# Patient Record
Sex: FEMALE | Race: WHITE | NOT HISPANIC OR LATINO | Employment: UNEMPLOYED | URBAN - NONMETROPOLITAN AREA
[De-identification: names, ages, dates, MRNs, and addresses within clinical notes are randomized per-mention and may not be internally consistent; named-entity substitution may affect disease eponyms.]

---
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
## 2023-06-02 ENCOUNTER — PATIENT OUTREACH (OUTPATIENT)
Dept: LABOR AND DELIVERY | Facility: HOSPITAL | Age: 18
End: 2023-06-02

## 2023-06-02 ENCOUNTER — REFERRAL TRIAGE (OUTPATIENT)
Dept: LABOR AND DELIVERY | Facility: HOSPITAL | Age: 18
End: 2023-06-02

## 2023-06-02 NOTE — OUTREACH NOTE
[facility]  Enrollment    Current Estimated Gestational Age: Unknown    Questions/Answers      Flowsheet Row Responses   Would like to participate? Yes   Date of Intake Visit 06/05/23  [call after 10 AM to complete Intake]            [facility]     Zo Palencia RN  Maternity Nurse Navigator    6/2/2023, 13:02 CDT

## 2023-06-05 ENCOUNTER — PATIENT OUTREACH (OUTPATIENT)
Dept: LABOR AND DELIVERY | Facility: HOSPITAL | Age: 18
End: 2023-06-05
Payer: COMMERCIAL

## 2023-06-05 NOTE — OUTREACH NOTE
[facility]  Intake    Current Estimated Gestational Age: 16w2d    Intake Assessment      Flowsheet Row Responses   Best Method for Contacting Cell   Currently Employed Yes  [-fulltime]   Able to keep appointments as scheduled Yes   Gender(s) and Name(s) male   Do you have a dentist? No  [Dental Clinics sent to client in resource letter]   Resources Presently Utilizing:   [Client states she currently does not have custody of her 2 children.  She states a family friend has them but she hopes to have custody back within a month.]   Maternal Warning Signs Provided  [sent as attachment to resource letter]   Other: Provided  [second trimester of pregnancy and round ligament pain sent to client in AVS]   Other Education HANDS, How to find a dentist, How to find a pediatrician, How to find a primary care provider, Insurance benefits/Incentives, Meds to Beds, Mental Health Services, SNAP Benefits, Smoking/Vaping Cessation, Substance Use Disorder Treatment, Transportation Assistance, WIC Benefits  [HANDS referral faxed with verbal consent from client]            Learning Assessment      Flowsheet Row Responses   Relationship Patient   Does the learner have any barriers to learning? No Barriers   What is the preferred language of the learner for medical teaching? English   How does the learner prefer to learn new concepts? Listening, Reading, Demonstration, Pictures/Video          SDOH updated and reviewed with the patient during this program:  Financial Resource Strain: Low Risk     Difficulty of Paying Living Expenses: Not very hard      Physical Activity: Sufficiently Active    Days of Exercise per Week: 5 days    Minutes of Exercise per Session: 30 min      Food Insecurity: No Food Insecurity    Worried About Running Out of Food in the Last Year: Never true    Ran Out of Food in the Last Year: Never true      Social Connections: Not on file      Transportation Needs: No Transportation  Needs    Lack of Transportation (Medical): No    Lack of Transportation (Non-Medical): No      Housing Stability: Not on file      Stress: Not on file           Referral submitted to the following resources (verbal consent received to submit demographic information):     SAUL Palencia RN  Maternity Nurse Navigator    6/5/2023, 10:14 CDT

## 2023-07-01 PROBLEM — O03.4 INCOMPLETE ABORTION: Status: ACTIVE | Noted: 2023-07-01

## 2023-07-02 PROBLEM — O03.1 ABORTION, INCOMPLETE WITH HEMORRHAGE: Status: ACTIVE | Noted: 2023-07-02

## 2023-10-06 ENCOUNTER — HOSPITAL ENCOUNTER (EMERGENCY)
Age: 18
Discharge: HOME | End: 2023-10-06
Attending: EMERGENCY MEDICINE
Payer: MEDICAID

## 2023-10-06 ENCOUNTER — APPOINTMENT (OUTPATIENT)
Dept: CT IMAGING | Age: 18
End: 2023-10-06
Payer: MEDICAID

## 2023-10-06 VITALS
WEIGHT: 110 LBS | DIASTOLIC BLOOD PRESSURE: 78 MMHG | OXYGEN SATURATION: 96 % | HEIGHT: 58 IN | HEART RATE: 92 BPM | RESPIRATION RATE: 18 BRPM | SYSTOLIC BLOOD PRESSURE: 132 MMHG | TEMPERATURE: 98.1 F | BODY MASS INDEX: 23.09 KG/M2

## 2023-10-06 DIAGNOSIS — R10.84 GENERALIZED ABDOMINAL PAIN: ICD-10-CM

## 2023-10-06 DIAGNOSIS — R11.2 NAUSEA AND VOMITING, UNSPECIFIED VOMITING TYPE: Primary | ICD-10-CM

## 2023-10-06 LAB
ALBUMIN SERPL-MCNC: 5.2 G/DL (ref 3.5–5.2)
ALP SERPL-CCNC: 41 U/L (ref 35–104)
ALT SERPL-CCNC: 17 U/L (ref 5–33)
ANION GAP SERPL CALCULATED.3IONS-SCNC: 14 MMOL/L (ref 7–19)
AST SERPL-CCNC: 22 U/L (ref 5–32)
BACTERIA URNS QL MICRO: NEGATIVE /HPF
BASOPHILS # BLD: 0 K/UL (ref 0–0.2)
BASOPHILS NFR BLD: 0.2 % (ref 0–1)
BILIRUB SERPL-MCNC: <0.2 MG/DL (ref 0.2–1.2)
BILIRUB UR QL STRIP: NEGATIVE
BUN SERPL-MCNC: 13 MG/DL (ref 6–20)
CALCIUM SERPL-MCNC: 10 MG/DL (ref 8.6–10)
CHLORIDE SERPL-SCNC: 104 MMOL/L (ref 98–111)
CLARITY UR: CLEAR
CO2 SERPL-SCNC: 20 MMOL/L (ref 22–29)
COLOR UR: YELLOW
CREAT SERPL-MCNC: 0.5 MG/DL (ref 0.5–0.9)
CRYSTALS URNS MICRO: ABNORMAL /HPF
EOSINOPHIL # BLD: 0 K/UL (ref 0–0.6)
EOSINOPHIL NFR BLD: 0.1 % (ref 0–5)
EPI CELLS #/AREA URNS AUTO: 6 /HPF (ref 0–5)
ERYTHROCYTE [DISTWIDTH] IN BLOOD BY AUTOMATED COUNT: 13.3 % (ref 11.5–14.5)
GLUCOSE SERPL-MCNC: 138 MG/DL (ref 74–109)
GLUCOSE UR STRIP.AUTO-MCNC: NEGATIVE MG/DL
HCG SERPL QL: NEGATIVE
HCT VFR BLD AUTO: 34.5 % (ref 37–47)
HGB BLD-MCNC: 11.3 G/DL (ref 12–16)
HGB UR STRIP.AUTO-MCNC: NEGATIVE MG/L
HYALINE CASTS #/AREA URNS AUTO: 11 /HPF (ref 0–8)
IMM GRANULOCYTES # BLD: 0.1 K/UL
KETONES UR STRIP.AUTO-MCNC: NEGATIVE MG/DL
LEUKOCYTE ESTERASE UR QL STRIP.AUTO: NEGATIVE
LIPASE SERPL-CCNC: 19 U/L (ref 13–60)
LYMPHOCYTES # BLD: 0.8 K/UL (ref 1.1–4.5)
LYMPHOCYTES NFR BLD: 4 % (ref 20–40)
MCH RBC QN AUTO: 26.8 PG (ref 27–31)
MCHC RBC AUTO-ENTMCNC: 32.8 G/DL (ref 33–37)
MCV RBC AUTO: 81.8 FL (ref 81–99)
MONOCYTES # BLD: 0.5 K/UL (ref 0–0.9)
MONOCYTES NFR BLD: 2.3 % (ref 0–10)
NEUTROPHILS # BLD: 18 K/UL (ref 1.5–7.5)
NEUTS SEG NFR BLD: 93.1 % (ref 50–65)
NITRITE UR QL STRIP.AUTO: NEGATIVE
PH UR STRIP.AUTO: 8 [PH] (ref 5–8)
PLATELET # BLD AUTO: 283 K/UL (ref 130–400)
PMV BLD AUTO: 11.1 FL (ref 9.4–12.3)
POTASSIUM SERPL-SCNC: 3.9 MMOL/L (ref 3.5–5)
PROT SERPL-MCNC: 7.9 G/DL (ref 6.6–8.7)
PROT UR STRIP.AUTO-MCNC: 100 MG/DL
RBC # BLD AUTO: 4.22 M/UL (ref 4.2–5.4)
RBC #/AREA URNS AUTO: 1 /HPF (ref 0–4)
SODIUM SERPL-SCNC: 138 MMOL/L (ref 136–145)
SP GR UR STRIP.AUTO: 1.04 (ref 1–1.03)
UROBILINOGEN UR STRIP.AUTO-MCNC: 0.2 E.U./DL
WBC # BLD AUTO: 19.3 K/UL (ref 4.8–10.8)
WBC #/AREA URNS AUTO: 1 /HPF (ref 0–5)

## 2023-10-06 PROCEDURE — 6360000004 HC RX CONTRAST MEDICATION: Performed by: EMERGENCY MEDICINE

## 2023-10-06 PROCEDURE — 96374 THER/PROPH/DIAG INJ IV PUSH: CPT

## 2023-10-06 PROCEDURE — 84703 CHORIONIC GONADOTROPIN ASSAY: CPT

## 2023-10-06 PROCEDURE — 80053 COMPREHEN METABOLIC PANEL: CPT

## 2023-10-06 PROCEDURE — 96361 HYDRATE IV INFUSION ADD-ON: CPT

## 2023-10-06 PROCEDURE — 83690 ASSAY OF LIPASE: CPT

## 2023-10-06 PROCEDURE — 2580000003 HC RX 258: Performed by: EMERGENCY MEDICINE

## 2023-10-06 PROCEDURE — 81001 URINALYSIS AUTO W/SCOPE: CPT

## 2023-10-06 PROCEDURE — 2500000003 HC RX 250 WO HCPCS: Performed by: EMERGENCY MEDICINE

## 2023-10-06 PROCEDURE — 85025 COMPLETE CBC W/AUTO DIFF WBC: CPT

## 2023-10-06 PROCEDURE — 74177 CT ABD & PELVIS W/CONTRAST: CPT

## 2023-10-06 PROCEDURE — 99285 EMERGENCY DEPT VISIT HI MDM: CPT

## 2023-10-06 PROCEDURE — 6360000002 HC RX W HCPCS: Performed by: EMERGENCY MEDICINE

## 2023-10-06 PROCEDURE — 96375 TX/PRO/DX INJ NEW DRUG ADDON: CPT

## 2023-10-06 PROCEDURE — 36415 COLL VENOUS BLD VENIPUNCTURE: CPT

## 2023-10-06 RX ORDER — ONDANSETRON 4 MG/1
4 TABLET, ORALLY DISINTEGRATING ORAL EVERY 8 HOURS PRN
Qty: 15 TABLET | Refills: 0 | Status: SHIPPED | OUTPATIENT
Start: 2023-10-06

## 2023-10-06 RX ORDER — METOCLOPRAMIDE HYDROCHLORIDE 5 MG/ML
10 INJECTION INTRAMUSCULAR; INTRAVENOUS ONCE
Status: COMPLETED | OUTPATIENT
Start: 2023-10-06 | End: 2023-10-06

## 2023-10-06 RX ORDER — 0.9 % SODIUM CHLORIDE 0.9 %
1000 INTRAVENOUS SOLUTION INTRAVENOUS ONCE
Status: COMPLETED | OUTPATIENT
Start: 2023-10-06 | End: 2023-10-06

## 2023-10-06 RX ORDER — DROPERIDOL 2.5 MG/ML
0.62 INJECTION, SOLUTION INTRAMUSCULAR; INTRAVENOUS ONCE
Status: COMPLETED | OUTPATIENT
Start: 2023-10-06 | End: 2023-10-06

## 2023-10-06 RX ORDER — ONDANSETRON 2 MG/ML
4 INJECTION INTRAMUSCULAR; INTRAVENOUS ONCE
Status: COMPLETED | OUTPATIENT
Start: 2023-10-06 | End: 2023-10-06

## 2023-10-06 RX ADMIN — SODIUM CHLORIDE, PRESERVATIVE FREE 20 MG: 5 INJECTION INTRAVENOUS at 16:05

## 2023-10-06 RX ADMIN — METOCLOPRAMIDE 10 MG: 5 INJECTION, SOLUTION INTRAMUSCULAR; INTRAVENOUS at 13:15

## 2023-10-06 RX ADMIN — SODIUM CHLORIDE 1000 ML: 9 INJECTION, SOLUTION INTRAVENOUS at 13:20

## 2023-10-06 RX ADMIN — ONDANSETRON 4 MG: 2 INJECTION INTRAMUSCULAR; INTRAVENOUS at 17:52

## 2023-10-06 RX ADMIN — DROPERIDOL 0.62 MG: 2.5 INJECTION, SOLUTION INTRAMUSCULAR; INTRAVENOUS at 16:04

## 2023-10-06 RX ADMIN — IOPAMIDOL 75 ML: 755 INJECTION, SOLUTION INTRAVENOUS at 16:25

## 2023-10-06 ASSESSMENT — PAIN - FUNCTIONAL ASSESSMENT: PAIN_FUNCTIONAL_ASSESSMENT: 0-10

## 2023-10-06 ASSESSMENT — ENCOUNTER SYMPTOMS
NAUSEA: 1
ABDOMINAL PAIN: 1
VOMITING: 1
SHORTNESS OF BREATH: 0

## 2023-10-06 ASSESSMENT — PAIN DESCRIPTION - LOCATION: LOCATION: ABDOMEN

## 2023-10-06 ASSESSMENT — PAIN SCALES - GENERAL: PAINLEVEL_OUTOF10: 8

## 2023-10-06 NOTE — ED PROVIDER NOTES
[facility] EMERGENCY DEPT  EMERGENCY DEPARTMENT ENCOUNTER      Pt Name: Kelsie Arevalo  MRN: [id number]  [address] [facility] [place] 2005  Date of evaluation: 10/6/2023  Provider: Roseline Monahan       Chief Complaint   Patient presents with    Abdominal Pain    Emesis     Since 0400 today         HISTORY OF PRESENT ILLNESS   (Location/Symptom, Timing/Onset, Context/Setting, Quality, Duration, Modifying Factors, Severity)  Note limiting factors. This is a 20-year-old female with a history of asthma, anxiety, depression, and ADHD presenting to the emergency department secondary to nausea and vomiting. The symptoms were sudden onset this morning approximately 430 a.m. Since onset of symptoms, her symptoms have not improved. Now, she complains of generalized abdominal tenderness. The patient smokes marijuana but otherwise denies other drug or alcohol use. The history is provided by the patient. Nursing Notes were reviewed. REVIEW OF SYSTEMS    (2-9 systems for level 4, 10 or more for level 5)     Review of Systems   Constitutional:  Negative for fever. Respiratory:  Negative for shortness of breath. Cardiovascular:  Negative for chest pain. Gastrointestinal:  Positive for abdominal pain, nausea and vomiting. Psychiatric/Behavioral:  Negative for confusion. All other systems reviewed and are negative. Except as noted above the remainder of the review of systems was reviewed and negative. PAST MEDICAL HISTORY     Past Medical History:   Diagnosis Date    ADHD (attention deficit hyperactivity disorder)     Anxiety and depression     Asthma     Depression          SURGICAL HISTORY       Past Surgical History:   Procedure Laterality Date    CYST REMOVAL      Right foot         CURRENT MEDICATIONS       Previous Medications    No medications on file       ALLERGIES     Patient has no known allergies.     FAMILY HISTORY       Family History   Problem Relation Age of Onset

## 2023-11-06 ENCOUNTER — HOSPITAL ENCOUNTER (EMERGENCY)
Age: 18
Discharge: HOME | End: 2023-11-06
Attending: EMERGENCY MEDICINE
Payer: MEDICAID

## 2023-11-06 VITALS
SYSTOLIC BLOOD PRESSURE: 118 MMHG | HEART RATE: 78 BPM | RESPIRATION RATE: 18 BRPM | OXYGEN SATURATION: 97 % | WEIGHT: 100 LBS | DIASTOLIC BLOOD PRESSURE: 78 MMHG | HEIGHT: 58 IN | TEMPERATURE: 97.8 F | BODY MASS INDEX: 20.99 KG/M2

## 2023-11-06 DIAGNOSIS — R19.7 NAUSEA VOMITING AND DIARRHEA: Primary | ICD-10-CM

## 2023-11-06 DIAGNOSIS — R11.2 NAUSEA VOMITING AND DIARRHEA: Primary | ICD-10-CM

## 2023-11-06 LAB
ALBUMIN SERPL-MCNC: 5.4 G/DL (ref 3.5–5.2)
ALP SERPL-CCNC: 54 U/L (ref 35–104)
ALT SERPL-CCNC: 19 U/L (ref 5–33)
ANION GAP SERPL CALCULATED.3IONS-SCNC: 16 MMOL/L (ref 7–19)
AST SERPL-CCNC: 33 U/L (ref 5–32)
BACTERIA #/AREA URNS HPF: ABNORMAL /HPF
BILIRUB SERPL-MCNC: 0.6 MG/DL (ref 0.2–1.2)
BILIRUB UR QL STRIP: NEGATIVE
BUN SERPL-MCNC: 11 MG/DL (ref 6–20)
CALCIUM SERPL-MCNC: 10.2 MG/DL (ref 8.6–10)
CHLORIDE SERPL-SCNC: 95 MMOL/L (ref 98–111)
CLARITY UR: ABNORMAL
CO2 SERPL-SCNC: 25 MMOL/L (ref 22–29)
COLOR UR: ABNORMAL
CREAT SERPL-MCNC: 0.5 MG/DL (ref 0.5–0.9)
ERYTHROCYTE [DISTWIDTH] IN BLOOD BY AUTOMATED COUNT: 14.3 % (ref 11.5–14.5)
GLUCOSE SERPL-MCNC: 103 MG/DL (ref 74–109)
GLUCOSE UR STRIP.AUTO-MCNC: NEGATIVE MG/DL
HCG SERPL QL: NEGATIVE
HCT VFR BLD AUTO: 40.1 % (ref 37–47)
HGB BLD-MCNC: 13.4 G/DL (ref 12–16)
HGB UR STRIP.AUTO-MCNC: NEGATIVE MG/L
KETONES UR STRIP.AUTO-MCNC: =>160 MG/DL
LEUKOCYTE ESTERASE UR QL STRIP.AUTO: ABNORMAL
LIPASE SERPL-CCNC: 28 U/L (ref 13–60)
MCH RBC QN AUTO: 26.2 PG (ref 27–31)
MCHC RBC AUTO-ENTMCNC: 33.4 G/DL (ref 33–37)
MCV RBC AUTO: 78.3 FL (ref 81–99)
MUCOUS THREADS URNS QL MICRO: ABNORMAL /LPF
NITRITE UR QL STRIP.AUTO: NEGATIVE
PH UR STRIP.AUTO: 6 [PH] (ref 5–8)
PLATELET # BLD AUTO: 290 K/UL (ref 130–400)
PMV BLD AUTO: 10.3 FL (ref 9.4–12.3)
POTASSIUM SERPL-SCNC: 3.7 MMOL/L (ref 3.5–5)
PROT SERPL-MCNC: 8.1 G/DL (ref 6.6–8.7)
PROT UR STRIP.AUTO-MCNC: 100 MG/DL
RBC # BLD AUTO: 5.12 M/UL (ref 4.2–5.4)
RBC #/AREA URNS HPF: ABNORMAL /HPF (ref 0–2)
SODIUM SERPL-SCNC: 136 MMOL/L (ref 136–145)
SP GR UR STRIP.AUTO: 1.04 (ref 1–1.03)
SQUAMOUS #/AREA URNS HPF: ABNORMAL /HPF
UROBILINOGEN UR STRIP.AUTO-MCNC: 1 E.U./DL
WBC # BLD AUTO: 13.2 K/UL (ref 4.8–10.8)
WBC #/AREA URNS HPF: ABNORMAL /HPF (ref 0–5)

## 2023-11-06 PROCEDURE — 2580000003 HC RX 258: Performed by: EMERGENCY MEDICINE

## 2023-11-06 PROCEDURE — 81001 URINALYSIS AUTO W/SCOPE: CPT

## 2023-11-06 PROCEDURE — 6370000000 HC RX 637 (ALT 250 FOR IP): Performed by: EMERGENCY MEDICINE

## 2023-11-06 PROCEDURE — 6360000002 HC RX W HCPCS: Performed by: EMERGENCY MEDICINE

## 2023-11-06 PROCEDURE — 87086 URINE CULTURE/COLONY COUNT: CPT

## 2023-11-06 PROCEDURE — 83690 ASSAY OF LIPASE: CPT

## 2023-11-06 PROCEDURE — 96374 THER/PROPH/DIAG INJ IV PUSH: CPT

## 2023-11-06 PROCEDURE — 80053 COMPREHEN METABOLIC PANEL: CPT

## 2023-11-06 PROCEDURE — 96361 HYDRATE IV INFUSION ADD-ON: CPT

## 2023-11-06 PROCEDURE — 36415 COLL VENOUS BLD VENIPUNCTURE: CPT

## 2023-11-06 PROCEDURE — 96375 TX/PRO/DX INJ NEW DRUG ADDON: CPT

## 2023-11-06 PROCEDURE — 85027 COMPLETE CBC AUTOMATED: CPT

## 2023-11-06 PROCEDURE — 84703 CHORIONIC GONADOTROPIN ASSAY: CPT

## 2023-11-06 PROCEDURE — 99284 EMERGENCY DEPT VISIT MOD MDM: CPT

## 2023-11-06 PROCEDURE — 87186 SC STD MICRODIL/AGAR DIL: CPT

## 2023-11-06 RX ORDER — ONDANSETRON 4 MG/1
4 TABLET, ORALLY DISINTEGRATING ORAL 3 TIMES DAILY PRN
Qty: 12 TABLET | Refills: 0 | Status: SHIPPED | OUTPATIENT
Start: 2023-11-06

## 2023-11-06 RX ORDER — 0.9 % SODIUM CHLORIDE 0.9 %
1000 INTRAVENOUS SOLUTION INTRAVENOUS ONCE
Status: COMPLETED | OUTPATIENT
Start: 2023-11-06 | End: 2023-11-06

## 2023-11-06 RX ORDER — ONDANSETRON 2 MG/ML
4 INJECTION INTRAMUSCULAR; INTRAVENOUS ONCE
Status: COMPLETED | OUTPATIENT
Start: 2023-11-06 | End: 2023-11-06

## 2023-11-06 RX ORDER — METOCLOPRAMIDE HYDROCHLORIDE 5 MG/ML
10 INJECTION INTRAMUSCULAR; INTRAVENOUS ONCE
Status: COMPLETED | OUTPATIENT
Start: 2023-11-06 | End: 2023-11-06

## 2023-11-06 RX ADMIN — SODIUM CHLORIDE 1000 ML: 9 INJECTION, SOLUTION INTRAVENOUS at 21:19

## 2023-11-06 RX ADMIN — ALUMINUM HYDROXIDE, MAGNESIUM HYDROXIDE, AND SIMETHICONE: 200; 200; 20 SUSPENSION ORAL at 22:50

## 2023-11-06 RX ADMIN — ONDANSETRON 4 MG: 2 INJECTION INTRAMUSCULAR; INTRAVENOUS at 21:19

## 2023-11-06 RX ADMIN — METOCLOPRAMIDE 10 MG: 5 INJECTION, SOLUTION INTRAMUSCULAR; INTRAVENOUS at 22:50

## 2023-11-06 ASSESSMENT — ENCOUNTER SYMPTOMS
BACK PAIN: 0
SHORTNESS OF BREATH: 0
BLOOD IN STOOL: 0
DIARRHEA: 1
VOMITING: 1
NAUSEA: 1
EYE PAIN: 0
ABDOMINAL PAIN: 0

## 2023-11-06 ASSESSMENT — PAIN DESCRIPTION - LOCATION: LOCATION: ABDOMEN

## 2023-11-06 ASSESSMENT — PAIN - FUNCTIONAL ASSESSMENT: PAIN_FUNCTIONAL_ASSESSMENT: 0-10

## 2023-11-06 ASSESSMENT — LIFESTYLE VARIABLES: HOW OFTEN DO YOU HAVE A DRINK CONTAINING ALCOHOL: NEVER

## 2023-11-06 ASSESSMENT — PAIN DESCRIPTION - DESCRIPTORS: DESCRIPTORS: ACHING

## 2023-11-06 ASSESSMENT — PAIN SCALES - GENERAL: PAINLEVEL_OUTOF10: 6

## 2023-11-07 NOTE — ED PROVIDER NOTES
[facility] EMERGENCY DEPT  eMERGENCY dEPARTMENT eNCOUnter      Pt Name: Kalia Lao  MRN: [id number]  [address] [facility] 2005  Date of evaluation: 11/6/2023  Provider: Lisa Kingsley MD    [address]       Chief Complaint   Patient presents with    Emesis     Vomiting and diarrhea x 3 days         HISTORY OF PRESENT ILLNESS   (Location/Symptom, Timing/Onset,Context/Setting, Quality, Duration, Modifying Factors, Severity)  Note limiting factors. Kalia Lao is a 19 y.o. female who presents to the emergency department due to nausea vomiting diarrhea. Symptoms for 3 days. No recent travel or sick contacts. No blood emesis or stools. No urinary complaints. Denies any chronic medical problems. Denies abdominal pain. Says her abdomen feels a little sore from vomiting so much but denies pain otherwise. No fevers. No dysuria or flank pain. HPI    NursingNotes were reviewed. REVIEW OF SYSTEMS    (2-9 systems for level 4, 10 or more for level 5)     Review of Systems   Constitutional:  Negative for fever. Eyes:  Negative for pain. Respiratory:  Negative for shortness of breath. Cardiovascular:  Negative for chest pain and palpitations. Gastrointestinal:  Positive for diarrhea, nausea and vomiting. Negative for abdominal pain and blood in stool. Genitourinary:  Positive for decreased urine volume (slight). Negative for difficulty urinating, dysuria and flank pain. Musculoskeletal:  Negative for back pain and neck pain. Skin:  Negative for rash. Neurological:  Negative for weakness and headaches. All other systems reviewed and are negative. A complete review of systems was performed and is negative except as noted above in the HPI.        PAST MEDICAL HISTORY     Past Medical History:   Diagnosis Date    ADHD (attention deficit hyperactivity disorder)     Anxiety and depression     Asthma     Depression          SURGICAL HISTORY       Past Surgical History:   Procedure Laterality Date

## 2023-11-08 ENCOUNTER — HOSPITAL ENCOUNTER (EMERGENCY)
Age: 18
Discharge: HOME | End: 2023-11-08
Payer: MEDICAID

## 2023-11-08 ENCOUNTER — APPOINTMENT (OUTPATIENT)
Dept: CT IMAGING | Age: 18
End: 2023-11-08
Payer: MEDICAID

## 2023-11-08 VITALS
DIASTOLIC BLOOD PRESSURE: 82 MMHG | TEMPERATURE: 98.4 F | RESPIRATION RATE: 16 BRPM | OXYGEN SATURATION: 94 % | HEART RATE: 101 BPM | SYSTOLIC BLOOD PRESSURE: 114 MMHG

## 2023-11-08 DIAGNOSIS — R19.7 NAUSEA VOMITING AND DIARRHEA: Primary | ICD-10-CM

## 2023-11-08 DIAGNOSIS — R11.2 NAUSEA VOMITING AND DIARRHEA: Primary | ICD-10-CM

## 2023-11-08 LAB
ALBUMIN SERPL-MCNC: 5 G/DL (ref 3.5–5.2)
ALP SERPL-CCNC: 49 U/L (ref 35–104)
ALT SERPL-CCNC: 31 U/L (ref 5–33)
AMPHET UR QL SCN: NEGATIVE
AST SERPL-CCNC: 28 U/L (ref 5–32)
BACTERIA #/AREA URNS HPF: ABNORMAL /HPF
BARBITURATES UR QL SCN: NEGATIVE
BASOPHILS # BLD: 0 K/UL (ref 0–0.2)
BASOPHILS NFR BLD: 0.4 % (ref 0–1)
BENZODIAZ UR QL SCN: NEGATIVE
BILIRUB SERPL-MCNC: 0.7 MG/DL (ref 0.2–1.2)
BILIRUB UR QL STRIP: ABNORMAL
BUN SERPL-MCNC: 7 MG/DL (ref 6–20)
BUPRENORPHINE URINE: NEGATIVE
CALCIUM SERPL-MCNC: 9.9 MG/DL (ref 8.6–10)
CANNABINOIDS UR QL SCN: POSITIVE
CHLORIDE SERPL-SCNC: 91 MMOL/L (ref 98–111)
CLARITY UR: ABNORMAL
COCAINE UR QL SCN: NEGATIVE
COLOR UR: ABNORMAL
CREAT SERPL-MCNC: 0.5 MG/DL (ref 0.5–0.9)
DRUG SCREEN COMMENT UR-IMP: ABNORMAL
EOSINOPHIL # BLD: 0 K/UL (ref 0–0.6)
EOSINOPHIL NFR BLD: 0.4 % (ref 0–5)
ERYTHROCYTE [DISTWIDTH] IN BLOOD BY AUTOMATED COUNT: 14.2 % (ref 11.5–14.5)
FENTANYL SCREEN, URINE: NEGATIVE
GLUCOSE SERPL-MCNC: 91 MG/DL (ref 74–109)
GLUCOSE UR STRIP.AUTO-MCNC: NEGATIVE MG/DL
HCT VFR BLD AUTO: 39.2 % (ref 37–47)
HGB BLD-MCNC: 13.3 G/DL (ref 12–16)
HGB UR STRIP.AUTO-MCNC: ABNORMAL MG/L
IMM GRANULOCYTES # BLD: 0 K/UL
KETONES UR STRIP.AUTO-MCNC: 40 MG/DL
LEUKOCYTE ESTERASE UR QL STRIP.AUTO: ABNORMAL
LYMPHOCYTES # BLD: 3 K/UL (ref 1.1–4.5)
LYMPHOCYTES NFR BLD: 32.5 % (ref 20–40)
MCH RBC QN AUTO: 26.2 PG (ref 27–31)
MCHC RBC AUTO-ENTMCNC: 33.9 G/DL (ref 33–37)
MCV RBC AUTO: 77.2 FL (ref 81–99)
METHADONE UR QL SCN: NEGATIVE
METHAMPHETAMINE, URINE: NEGATIVE
MONOCYTES # BLD: 0.6 K/UL (ref 0–0.9)
MONOCYTES NFR BLD: 7.1 % (ref 0–10)
NEUTROPHILS # BLD: 5.4 K/UL (ref 1.5–7.5)
NEUTS SEG NFR BLD: 59.4 % (ref 50–65)
NITRITE UR QL STRIP.AUTO: POSITIVE
OPIATES UR QL SCN: NEGATIVE
OXYCODONE UR QL SCN: NEGATIVE
PCP UR QL SCN: NEGATIVE
PH UR STRIP.AUTO: 8.5 [PH] (ref 5–8)
PLATELET # BLD AUTO: 261 K/UL (ref 130–400)
PMV BLD AUTO: 10.5 FL (ref 9.4–12.3)
POTASSIUM SERPL-SCNC: 3.3 MMOL/L (ref 3.5–5)
PROT SERPL-MCNC: 7.8 G/DL (ref 6.6–8.7)
PROT UR STRIP.AUTO-MCNC: 100 MG/DL
RBC # BLD AUTO: 5.08 M/UL (ref 4.2–5.4)
RBC #/AREA URNS HPF: ABNORMAL /HPF (ref 0–2)
SODIUM SERPL-SCNC: 131 MMOL/L (ref 136–145)
SP GR UR STRIP.AUTO: >=1.045 (ref 1–1.03)
SQUAMOUS #/AREA URNS HPF: ABNORMAL /HPF
TRICYCLIC, URINE: NEGATIVE
UROBILINOGEN UR STRIP.AUTO-MCNC: 0.2 E.U./DL
WBC # BLD AUTO: 9.1 K/UL (ref 4.8–10.8)
WBC #/AREA URNS HPF: ABNORMAL /HPF (ref 0–5)

## 2023-11-08 PROCEDURE — 85025 COMPLETE CBC W/AUTO DIFF WBC: CPT

## 2023-11-08 PROCEDURE — 96372 THER/PROPH/DIAG INJ SC/IM: CPT

## 2023-11-08 PROCEDURE — 36415 COLL VENOUS BLD VENIPUNCTURE: CPT

## 2023-11-08 PROCEDURE — 74177 CT ABD & PELVIS W/CONTRAST: CPT

## 2023-11-08 PROCEDURE — 96375 TX/PRO/DX INJ NEW DRUG ADDON: CPT

## 2023-11-08 PROCEDURE — 99285 EMERGENCY DEPT VISIT HI MDM: CPT

## 2023-11-08 PROCEDURE — 6370000000 HC RX 637 (ALT 250 FOR IP): Performed by: PHYSICIAN ASSISTANT

## 2023-11-08 PROCEDURE — 2580000003 HC RX 258: Performed by: NURSE PRACTITIONER

## 2023-11-08 PROCEDURE — 6360000004 HC RX CONTRAST MEDICATION: Performed by: NURSE PRACTITIONER

## 2023-11-08 PROCEDURE — 81001 URINALYSIS AUTO W/SCOPE: CPT

## 2023-11-08 PROCEDURE — 6360000002 HC RX W HCPCS: Performed by: PHYSICIAN ASSISTANT

## 2023-11-08 PROCEDURE — 80053 COMPREHEN METABOLIC PANEL: CPT

## 2023-11-08 PROCEDURE — 80307 DRUG TEST PRSMV CHEM ANLYZR: CPT

## 2023-11-08 PROCEDURE — 96374 THER/PROPH/DIAG INJ IV PUSH: CPT

## 2023-11-08 RX ORDER — DICYCLOMINE HYDROCHLORIDE 10 MG/ML
20 INJECTION INTRAMUSCULAR ONCE
Status: COMPLETED | OUTPATIENT
Start: 2023-11-08 | End: 2023-11-08

## 2023-11-08 RX ORDER — CEPHALEXIN 500 MG/1
500 CAPSULE ORAL 2 TIMES DAILY
Qty: 14 CAPSULE | Refills: 0 | Status: SHIPPED | OUTPATIENT
Start: 2023-11-08 | End: 2023-11-08

## 2023-11-08 RX ORDER — ONDANSETRON 2 MG/ML
4 INJECTION INTRAMUSCULAR; INTRAVENOUS ONCE
Status: COMPLETED | OUTPATIENT
Start: 2023-11-08 | End: 2023-11-08

## 2023-11-08 RX ORDER — METOCLOPRAMIDE HYDROCHLORIDE 5 MG/ML
10 INJECTION INTRAMUSCULAR; INTRAVENOUS ONCE
Status: COMPLETED | OUTPATIENT
Start: 2023-11-08 | End: 2023-11-08

## 2023-11-08 RX ORDER — 0.9 % SODIUM CHLORIDE 0.9 %
500 INTRAVENOUS SOLUTION INTRAVENOUS ONCE
Status: COMPLETED | OUTPATIENT
Start: 2023-11-08 | End: 2023-11-08

## 2023-11-08 RX ORDER — DROPERIDOL 2.5 MG/ML
0.62 INJECTION, SOLUTION INTRAMUSCULAR; INTRAVENOUS EVERY 6 HOURS PRN
Status: DISCONTINUED | OUTPATIENT
Start: 2023-11-08 | End: 2023-11-09 | Stop reason: HOSPADM

## 2023-11-08 RX ADMIN — ONDANSETRON 4 MG: 2 INJECTION INTRAMUSCULAR; INTRAVENOUS at 21:50

## 2023-11-08 RX ADMIN — METOCLOPRAMIDE 10 MG: 5 INJECTION, SOLUTION INTRAMUSCULAR; INTRAVENOUS at 20:52

## 2023-11-08 RX ADMIN — SODIUM CHLORIDE 500 ML: 9 INJECTION, SOLUTION INTRAVENOUS at 18:40

## 2023-11-08 RX ADMIN — ALUMINUM HYDROXIDE, MAGNESIUM HYDROXIDE, AND SIMETHICONE: 200; 200; 20 SUSPENSION ORAL at 21:52

## 2023-11-08 RX ADMIN — DROPERIDOL 0.62 MG: 2.5 INJECTION, SOLUTION INTRAMUSCULAR; INTRAVENOUS at 23:09

## 2023-11-08 RX ADMIN — DICYCLOMINE HYDROCHLORIDE 20 MG: 20 INJECTION, SOLUTION INTRAMUSCULAR at 20:52

## 2023-11-08 RX ADMIN — IOPAMIDOL 90 ML: 755 INJECTION, SOLUTION INTRAVENOUS at 19:12

## 2023-11-08 ASSESSMENT — ENCOUNTER SYMPTOMS
COLOR CHANGE: 0
VOMITING: 1
PHOTOPHOBIA: 0
BLOOD IN STOOL: 0
BACK PAIN: 0
APNEA: 0
RESPIRATORY NEGATIVE: 1
COUGH: 0
SORE THROAT: 0
ABDOMINAL DISTENTION: 0
EYE DISCHARGE: 0
SHORTNESS OF BREATH: 0
EYE PAIN: 0
NAUSEA: 1
RHINORRHEA: 0
ABDOMINAL PAIN: 1
DIARRHEA: 0

## 2023-11-08 ASSESSMENT — PAIN SCALES - GENERAL
PAINLEVEL_OUTOF10: 8
PAINLEVEL_OUTOF10: 4

## 2023-11-08 ASSESSMENT — PAIN DESCRIPTION - LOCATION
LOCATION: ABDOMEN
LOCATION: ABDOMEN

## 2023-11-08 ASSESSMENT — PAIN DESCRIPTION - DESCRIPTORS
DESCRIPTORS: ACHING;CRAMPING
DESCRIPTORS: SHARP

## 2023-11-08 NOTE — ED PROVIDER NOTES
[facility] EMERGENCY DEPT  EMERGENCY DEPARTMENT ENCOUNTER      Pt Name: Joel Baxter  MRN: [id number]  [address] [facility] 2005  Date of evaluation: 11/8/2023  Provider: JACKSON Peter NP    CHIEF COMPLAINT       Chief Complaint   Patient presents with    Emesis         HISTORY OF PRESENT ILLNESS   (Location/Symptom, Timing/Onset,Context/Setting, Quality, Duration, Modifying Factors, Severity)  Note limiting factors. Joel Baxter is a 19 y.o. female who presents to the emergency department complaint of vomiting for 4 days. She was evaluated in this ER 2 days earlier and declined a CT of the abdomen at that time. She saw her primary care provider yesterday and was given Phenergan suppositories but reports that she continues to vomit. She denies any blood in the vomitus and denies diarrhea. She reports that she has had this problem for \"a long time\". She denies any sexual activity in the last few days and her urine hCG was negative on 11/6. The history is provided by the patient. NursingNotes were reviewed. REVIEW OF SYSTEMS    (2-9 systems for level 4, 10 or more for level 5)     Review of Systems   Constitutional:  Negative for activity change, chills and fever. Respiratory: Negative. Cardiovascular: Negative. Gastrointestinal:  Positive for abdominal pain, nausea and vomiting. Negative for blood in stool and diarrhea. Genitourinary: Negative. Musculoskeletal: Negative. Neurological: Negative. All other systems reviewed and are negative. A complete review of systems was performed and is negative except as noted above in the HPI.        PAST MEDICAL HISTORY     Past Medical History:   Diagnosis Date    ADHD (attention deficit hyperactivity disorder)     Anxiety and depression     Asthma     Depression          SURGICAL HISTORY       Past Surgical History:   Procedure Laterality Date    CYST REMOVAL      Right foot         CURRENT MEDICATIONS       Current Discharge

## 2023-11-09 LAB
BACTERIA UR CULT: ABNORMAL
BACTERIA UR CULT: ABNORMAL
ORGANISM: ABNORMAL

## 2023-11-09 NOTE — DISCHARGE INSTRUCTIONS
Need outpatient stool sample for hpylori rule out with cat desir  Avoid marijuana usage 72 hrs  Liquid diet

## 2023-11-09 NOTE — ED NOTES
Pt able to keep PO medication down and states nausea has subsided at this time.       Elodia Shaikh  11/08/23 9894

## 2023-11-09 NOTE — ED NOTES
Pt back to room from [facility], [address] [place] [address], [address] [place] [address]  11/08/23 1919

## 2023-11-09 NOTE — ED PROVIDER NOTES
[facility] EMERGENCY DEPT  eMERGENCYdEPARTMENT eNCOUnter      Pt Name: Chelsea Lagunas  MRN: [id number]  [address] [facility] 2005  Date of evaluation: 11/8/2023  Provider:ORI Duncan    CHIEF COMPLAINT       Chief Complaint   Patient presents with    Emesis         HISTORY OF PRESENT ILLNESS  (Location/Symptom, Timing/Onset, Context/Setting, Quality, Duration, Modifying Factors, Severity.)   Chelsea Lagunas is a 19 y.o. female who presents to the emergency department with emesis soft abdomen promethazine suppositories on board I am inheriting from [facility] on day shift awaiting CT scan. Has been asleep here without emesis here. No fever chills fluids given. Has been here before Ct offered then and declined followed with PCP promethazine was given. She has some loose stool recently moved in with boyfriend has well water. H pylori in differential.     HPI    Nursing Notes were reviewed and I agree. REVIEW OF SYSTEMS    (2-9 systems for level 4, 10 or more for level 5)     Review of Systems   Constitutional:  Negative for activity change, appetite change, chills and fever. HENT:  Negative for congestion, postnasal drip, rhinorrhea and sore throat. Eyes:  Negative for photophobia, pain, discharge and visual disturbance. Respiratory:  Negative for apnea, cough and shortness of breath. Cardiovascular:  Negative for chest pain and leg swelling. Gastrointestinal:  Positive for abdominal pain, nausea and vomiting. Negative for abdominal distention. Genitourinary:  Negative for vaginal bleeding. Musculoskeletal:  Negative for arthralgias, back pain, joint swelling, neck pain and neck stiffness. Skin:  Negative for color change and rash. Neurological:  Negative for dizziness, syncope, facial asymmetry and headaches. Hematological:  Negative for adenopathy. Does not bruise/bleed easily. Psychiatric/Behavioral:  Negative for agitation, behavioral problems and confusion.          Except as noted above the remainder of

## 2023-11-09 NOTE — ED NOTES
Pt ambulatory with steady gait to restroom. Pt unable to give urine sample or stool sample at this time. Dasia REHMAN notified and aware.       Wood Garcia, [address]  11/08/23 2047

## 2023-11-10 ENCOUNTER — HOSPITAL ENCOUNTER (EMERGENCY)
Facility: HOSPITAL | Age: 18
Discharge: HOME | End: 2023-11-10
Payer: COMMERCIAL

## 2023-11-10 ENCOUNTER — NURSE TRIAGE (OUTPATIENT)
Dept: OTHER | Facility: CLINIC | Age: 18
End: 2023-11-10

## 2023-11-10 VITALS
DIASTOLIC BLOOD PRESSURE: 95 MMHG | BODY MASS INDEX: 19.31 KG/M2 | OXYGEN SATURATION: 96 % | WEIGHT: 89.5 LBS | TEMPERATURE: 98.6 F | RESPIRATION RATE: 18 BRPM | HEIGHT: 57 IN | HEART RATE: 78 BPM | SYSTOLIC BLOOD PRESSURE: 127 MMHG

## 2023-11-10 DIAGNOSIS — N39.0 BACTERIAL URINARY INFECTION: Primary | ICD-10-CM

## 2023-11-10 DIAGNOSIS — A49.9 BACTERIAL URINARY INFECTION: Primary | ICD-10-CM

## 2023-11-10 LAB
AMPHET+METHAMPHET UR QL: NEGATIVE
AMPHETAMINES UR QL: NEGATIVE
B-HCG UR QL: NEGATIVE
BACTERIA UR QL AUTO: ABNORMAL /HPF
BARBITURATES UR QL SCN: NEGATIVE
BENZODIAZ UR QL SCN: NEGATIVE
BILIRUB UR QL STRIP: NEGATIVE
BUPRENORPHINE SERPL-MCNC: NEGATIVE NG/ML
CANNABINOIDS SERPL QL: POSITIVE
CLARITY UR: ABNORMAL
COCAINE UR QL: NEGATIVE
COLOR UR: YELLOW
FENTANYL UR-MCNC: NEGATIVE NG/ML
GLUCOSE UR STRIP-MCNC: NEGATIVE MG/DL
HGB UR QL STRIP.AUTO: ABNORMAL
HYALINE CASTS UR QL AUTO: ABNORMAL /LPF
KETONES UR QL STRIP: ABNORMAL
LEUKOCYTE ESTERASE UR QL STRIP.AUTO: ABNORMAL
METHADONE UR QL SCN: NEGATIVE
NITRITE UR QL STRIP: POSITIVE
OPIATES UR QL: NEGATIVE
OXYCODONE UR QL SCN: NEGATIVE
PCP UR QL SCN: NEGATIVE
PH UR STRIP.AUTO: 7.5 [PH] (ref 5–8)
PROT UR QL STRIP: NEGATIVE
RBC # UR STRIP: ABNORMAL /HPF
REF LAB TEST METHOD: ABNORMAL
SP GR UR STRIP: 1.02 (ref 1–1.03)
SQUAMOUS #/AREA URNS HPF: ABNORMAL /HPF
TRICYCLICS UR QL SCN: NEGATIVE
UROBILINOGEN UR QL STRIP: ABNORMAL
WBC # UR STRIP: ABNORMAL /HPF

## 2023-11-10 PROCEDURE — 87186 SC STD MICRODIL/AGAR DIL: CPT | Performed by: EMERGENCY MEDICINE

## 2023-11-10 PROCEDURE — 87086 URINE CULTURE/COLONY COUNT: CPT | Performed by: EMERGENCY MEDICINE

## 2023-11-10 PROCEDURE — 87077 CULTURE AEROBIC IDENTIFY: CPT | Performed by: EMERGENCY MEDICINE

## 2023-11-10 PROCEDURE — 99282 EMERGENCY DEPT VISIT SF MDM: CPT

## 2023-11-10 PROCEDURE — 81025 URINE PREGNANCY TEST: CPT | Performed by: EMERGENCY MEDICINE

## 2023-11-10 PROCEDURE — 81001 URINALYSIS AUTO W/SCOPE: CPT | Performed by: EMERGENCY MEDICINE

## 2023-11-10 PROCEDURE — 80307 DRUG TEST PRSMV CHEM ANLYZR: CPT | Performed by: EMERGENCY MEDICINE

## 2023-11-10 RX ORDER — SULFAMETHOXAZOLE AND TRIMETHOPRIM 800; 160 MG/1; MG/1
1 TABLET ORAL 2 TIMES DAILY
Qty: 20 TABLET | Refills: 0 | Status: SHIPPED | OUTPATIENT
Start: 2023-11-10 | End: 2023-11-20

## 2023-11-10 NOTE — ED PROVIDER NOTES
Subjective   History of Present Illness  18 year old female presents to the ED with complaints of nausea and vomiting.  This has been present for a week. She denies recent sick contact or travel. She has had a full workup completed at [facility].  She was initially seen there on 11/6 and had serum and urine labs performed, a CT abdomen was offered, however, patient declined at that time. She returned to [facility] ED on 11/8 had labs repeated, without abnormal findings, a CT was performed at that time, also negative.  Urine drug screen reveals marijuana use.  She was advised to avoid marijuana use and follow up with her primary care provider. She reports she called her primary care provider today who told her to return to the ER.       Review of Systems   Constitutional: Negative.    HENT: Negative.     Eyes: Negative.    Respiratory: Negative.     Gastrointestinal:  Positive for nausea and vomiting. Negative for abdominal pain.   Genitourinary: Negative.    Musculoskeletal: Negative.    Neurological: Negative.    Hematological: Negative.    Psychiatric/Behavioral: Negative.         Past Medical History:   Diagnosis Date    ADHD     Allergic     Anxiety     Asthma     Depression        Allergies   Allergen Reactions    Blue Dyes (Parenteral) Rash    Other Rash      Blackberries, cats       Past Surgical History:   Procedure Laterality Date    DILATATION AND CURETTAGE N/A 7/1/2023    Procedure: DILATATION AND CURETTAGE;  Surgeon: Darryl Del Valle MD;  Location: [facility] OR;  Service: Obstetrics/Gynecology;  Laterality: N/A;    FOOT SURGERY         Family History   Problem Relation Age of Onset    Arthritis Mother     Depression Mother     Dementia Mother     Hypertension Mother     Heart attack Father     Diabetes Father     Alcohol abuse Father     Hypertension Maternal Grandmother     Diabetes Maternal Grandfather     Hypertension Maternal Grandfather     Heart disease Maternal Grandfather     Breast cancer Neg Hx      Ovarian cancer Neg Hx     Uterine cancer Neg Hx     Colon cancer Neg Hx        Social History     Socioeconomic History    Marital status: Single   Tobacco Use    Smoking status: Former     Types: Cigarettes     Quit date: 2/24/2020     Years since quitting: 3.7    Smokeless tobacco: Never   Vaping Use    Vaping Use: Every day    Substances: Nicotine   Substance and Sexual Activity    Alcohol use: No    Drug use: Yes     Types: Marijuana     Comment: 8/12/22 last use    Sexual activity: Yes     Partners: Male     Birth control/protection: None           Objective   Physical Exam  Constitutional:       Appearance: She is normal weight. She is not ill-appearing or toxic-appearing.   HENT:      Head: Normocephalic.   Cardiovascular:      Rate and Rhythm: Normal rate.      Heart sounds: Normal heart sounds.   Pulmonary:      Effort: Pulmonary effort is normal.   Abdominal:      General: Abdomen is flat. Bowel sounds are normal.      Palpations: Abdomen is soft.      Tenderness: generalized  There is no guarding or rebound. Negative signs include psoas sign and obturator sign.   Musculoskeletal:         General: Normal range of motion.   Skin:     General: Skin is warm and dry.      Capillary Refill: Capillary refill takes less than 2 seconds.   Neurological:      General: No focal deficit present.      Mental Status: She is alert and oriented to person, place, and time.         Procedures           ED Course  ED Course as of 11/10/23 1342   Fri Nov 10, 2023   1340 Urinalysis positive for bacteria and nitrates [JM]      ED Course User Index  [JM] Sonia Arora APRN                                           Medical Decision Making  Problems Addressed:  Bacterial urinary infection: self-limited or minor problem    Amount and/or Complexity of Data Reviewed  External Data Reviewed: labs and radiology.     Details: Urinalysis suspicious for UTI at [facility]  CT abdomen normal at [facility]  Labs: ordered.     Details:  Urinalysis turbid with nitrates and bacteria    Risk  Prescription drug management.        Final diagnoses:   Bacterial urinary infection       ED Disposition  ED Disposition       ED Disposition   Discharge    Condition   Stable    Comment   --               Shirin Nuno, APRN  [address] [address] [phone]    In 1 week           Medication List        New Prescriptions      sulfamethoxazole-trimethoprim 800-160 MG per tablet  Commonly known as: BACTRIM DS,SEPTRA DS  Take 1 tablet by mouth 2 (Two) Times a Day for 10 days.               Where to Get Your Medications        These medications were sent to [facility] - [place] - [address] - [phone]  - [phone]  [address], [facility] [address]      Phone: [phone]   sulfamethoxazole-trimethoprim 800-160 MG per tablet            Sonia Arora, APRN  11/10/23 2641

## 2023-11-10 NOTE — TELEPHONE ENCOUNTER
Location of patient: [place]    Received call from Braxton Dolan at [facility] with Red Flag Complaint. Subjective: Caller states \" Lower back pain with vomiting and chills. \"     Current Symptoms:   \"Cant hold anything down, not even water, started at 0345 this morning. \"    Back pain started last night over a week ago. Very weak, states she cannot stand very long. Back pain 5/10 now. Patient states she has been to the ED twice in the past week. Patient states that her abdominal pain is so severe she has been in the fetal position all day. Onset: 1 week ago; worsening    Associated Symptoms: reduced activity    Pain Severity: Abdominal pain 8/10; aching; constant    Temperature: Denies     What has been tried: NA    LMP:  On it now. Pregnant: No    Recommended disposition: Go to ED Now    Care advice provided, patient verbalizes understanding; denies any other questions or concerns; instructed to call back for any new or worsening symptoms. Patient/caller agrees to proceed to nearest Emergency Department    Attention Provider: Thank you for allowing me to participate in the care of your patient. The patient was connected to triage in response to information provided to the ECC/PSC. Please do not respond through this encounter as the response is not directed to a shared pool.     Reason for Disposition   SEVERE abdominal pain (e.g., excruciating)    Protocols used: Back Pain-ADULT-[place]

## 2023-11-10 NOTE — Clinical Note
[facility] EMERGENCY DEPARTMENT  [address]  [facility] [address]  Phone: [phone]    Rosmery Padilla was seen and treated in our emergency department on 11/10/2023.  She may return to work on 11/12/2023.         Thank you for choosing [facility].    Sepideh Harris, RN

## 2023-11-12 ENCOUNTER — TELEPHONE (OUTPATIENT)
Dept: EMERGENCY DEPT | Facility: HOSPITAL | Age: 18
End: 2023-11-12
Payer: COMMERCIAL

## 2023-11-12 LAB — BACTERIA SPEC AEROBE CULT: ABNORMAL

## 2023-12-02 ENCOUNTER — HOSPITAL ENCOUNTER (EMERGENCY)
Facility: HOSPITAL | Age: 18
Discharge: HOME | End: 2023-12-02
Attending: STUDENT IN AN ORGANIZED HEALTH CARE EDUCATION/TRAINING PROGRAM
Payer: COMMERCIAL

## 2023-12-02 VITALS
WEIGHT: 89 LBS | HEIGHT: 57 IN | OXYGEN SATURATION: 99 % | BODY MASS INDEX: 19.2 KG/M2 | RESPIRATION RATE: 16 BRPM | SYSTOLIC BLOOD PRESSURE: 101 MMHG | DIASTOLIC BLOOD PRESSURE: 66 MMHG | TEMPERATURE: 99.9 F | HEART RATE: 100 BPM

## 2023-12-02 DIAGNOSIS — U07.1 COVID-19: ICD-10-CM

## 2023-12-02 DIAGNOSIS — B34.9 VIRAL SYNDROME: Primary | ICD-10-CM

## 2023-12-02 LAB
FLUAV RNA RESP QL NAA+PROBE: NOT DETECTED
FLUBV RNA RESP QL NAA+PROBE: NOT DETECTED
SARS-COV-2 RNA RESP QL NAA+PROBE: DETECTED

## 2023-12-02 PROCEDURE — 87636 SARSCOV2 & INF A&B AMP PRB: CPT | Performed by: INTERNAL MEDICINE

## 2023-12-02 PROCEDURE — 99283 EMERGENCY DEPT VISIT LOW MDM: CPT

## 2023-12-02 RX ORDER — ONDANSETRON 4 MG/1
4 TABLET, ORALLY DISINTEGRATING ORAL EVERY 8 HOURS PRN
Qty: 6 TABLET | Refills: 0 | Status: SHIPPED | OUTPATIENT
Start: 2023-12-02

## 2023-12-02 NOTE — Clinical Note
[facility] EMERGENCY DEPARTMENT  [address]  [facility] [address]  Phone: [phone]    Rosmery Padilla was seen and treated in our emergency department on 12/2/2023.  She may return to work on 12/06/2023.         Thank you for choosing [facility].    Javier Foley MD

## 2023-12-02 NOTE — ED PROVIDER NOTES
Subjective   History of Present Illness  Patient presents due to cough congestion sore throat.  Symptoms started over the past day.  She was exposed to COVID on Wednesday.  She came in to get tested because she will need a work excuse if her COVID test is positive.  She has had nausea, had 1 episode of emesis, has no abdominal pain, is otherwise tolerating p.o.  No shortness of breath.  No chest pain.  Cough is productive of mucus.  No fevers.  Denies other past medical history, does not take any medications currently.  No shortness of breath.  No pain or swelling in her legs.    Review of Systems   Constitutional:  Negative for chills and fever.   Respiratory:  Positive for cough. Negative for shortness of breath.    Cardiovascular:  Negative for chest pain and palpitations.   Gastrointestinal:  Negative for abdominal pain and vomiting.   Genitourinary:  Negative for difficulty urinating and dysuria.   Neurological:  Negative for syncope and light-headedness.       Past Medical History:   Diagnosis Date    ADHD     Allergic     Anxiety     Asthma     Depression        Allergies   Allergen Reactions    Blue Dyes (Parenteral) Rash    Other Rash      Blackberries, cats       Past Surgical History:   Procedure Laterality Date    DILATATION AND CURETTAGE N/A 7/1/2023    Procedure: DILATATION AND CURETTAGE;  Surgeon: Darryl Del Valle MD;  Location: [facility] OR;  Service: Obstetrics/Gynecology;  Laterality: N/A;    FOOT SURGERY         Family History   Problem Relation Age of Onset    Arthritis Mother     Depression Mother     Dementia Mother     Hypertension Mother     Heart attack Father     Diabetes Father     Alcohol abuse Father     Hypertension Maternal Grandmother     Diabetes Maternal Grandfather     Hypertension Maternal Grandfather     Heart disease Maternal Grandfather     Breast cancer Neg Hx     Ovarian cancer Neg Hx     Uterine cancer Neg Hx     Colon cancer Neg Hx        Social History     Socioeconomic  History    Marital status: Single   Tobacco Use    Smoking status: Former     Types: Cigarettes     Quit date: 2/24/2020     Years since quitting: 3.7    Smokeless tobacco: Never   Vaping Use    Vaping Use: Every day    Substances: Nicotine   Substance and Sexual Activity    Alcohol use: No    Drug use: Yes     Types: Marijuana     Comment: 8/12/22 last use    Sexual activity: Yes     Partners: Male     Birth control/protection: None           Objective   Physical Exam  Vitals reviewed.   Constitutional:       General: She is not in acute distress.  HENT:      Head: Normocephalic and atraumatic.      Comments: No focal intraoral swelling.  No uvular deviation.  No posterior pharyngeal erythema or exudate.  No tonsillar exudate or focal tonsillar swelling.  Intraoral exam overall unremarkable. No LAD.  Eyes:      Extraocular Movements: Extraocular movements intact.      Conjunctiva/sclera: Conjunctivae normal.   Cardiovascular:      Pulses: Normal pulses.      Heart sounds: Normal heart sounds.   Pulmonary:      Effort: Pulmonary effort is normal. No respiratory distress.      Breath sounds: Normal breath sounds. No wheezing.   Abdominal:      General: Abdomen is flat. There is no distension.      Tenderness: There is no abdominal tenderness. There is no guarding.   Musculoskeletal:         General: No swelling or tenderness.      Cervical back: Normal range of motion and neck supple.      Right lower leg: No edema.      Left lower leg: No edema.   Skin:     General: Skin is warm and dry.   Neurological:      General: No focal deficit present.      Mental Status: She is alert. Mental status is at baseline.   Psychiatric:         Behavior: Behavior normal.         Thought Content: Thought content normal.         Procedures           ED Course                                             Medical Decision Making  Problems Addressed:  COVID-19: complicated acute illness or injury  Viral syndrome: complicated acute illness  or injury    Risk  Prescription drug management.      Rosmerydarrion Padilla is a 18 y.o. female with PMH above who presents to the Emergency Department with viral symptoms; cough  congestion sore throat. Clinical concern for COVID-19, Differential diagnosis includes other URI. No adventitious lung sounds to suggest pneumonia. No hypoxia on triage vitals. No increased work of breathing. Centor score 0.  No history DVT, no estrogen use, no shortness of breath, no signs of PE. No idnications for paxlovid.    COVID test obtained.  Patient counseled on following-up the test and routine isolation precautions. Strict return precautions were given including worsening shortness of breath, significant chest pain, lightheadedness, syncope, or other emergent concerns.  They were advised to follow-up with the primary care doctor.    All questions answered. Patient/family was understanding and in agreement with today's assessment and plan. The patient was monitored during their stay in the ED and dispositioned without acute event      Final diagnoses:   Viral syndrome   COVID-19       ED Disposition  ED Disposition       ED Disposition   Discharge    Condition   Stable    Comment   --               No follow-up provider specified.       Medication List        New Prescriptions      ondansetron ODT 4 MG disintegrating tablet  Commonly known as: ZOFRAN-ODT  Place 1 tablet on the tongue Every 8 (Eight) Hours As Needed for Nausea or Vomiting for up to 6 doses.               Where to Get Your Medications        These medications were sent to [facility] #[id number] - [place], [place] - [address] AT ACROSS FROM [address] - [phone] [facility] [phone]   [address] [place] [address]      Phone: [phone]   ondansetron ODT 4 MG disintegrating tablet            Javier Foley MD  12/02/23 7036

## 2023-12-02 NOTE — DISCHARGE INSTRUCTIONS
Please return if you have severely worsening pain, new concerning symptoms, or if you develop other emergent concerns. Otherwise please follow-up with your primary care doctor regarding your ER visit today.

## 2023-12-16 ENCOUNTER — HOSPITAL ENCOUNTER (EMERGENCY)
Facility: HOSPITAL | Age: 18
Discharge: HOME | End: 2023-12-16
Payer: COMMERCIAL

## 2023-12-16 ENCOUNTER — APPOINTMENT (OUTPATIENT)
Dept: GENERAL RADIOLOGY | Facility: HOSPITAL | Age: 18
End: 2023-12-16
Payer: COMMERCIAL

## 2023-12-16 VITALS
SYSTOLIC BLOOD PRESSURE: 108 MMHG | RESPIRATION RATE: 20 BRPM | DIASTOLIC BLOOD PRESSURE: 80 MMHG | TEMPERATURE: 98 F | OXYGEN SATURATION: 98 % | HEART RATE: 115 BPM | HEIGHT: 57 IN | WEIGHT: 86 LBS | BODY MASS INDEX: 18.55 KG/M2

## 2023-12-16 DIAGNOSIS — R11.2 NAUSEA AND VOMITING, UNSPECIFIED VOMITING TYPE: Primary | ICD-10-CM

## 2023-12-16 DIAGNOSIS — Z86.16 HISTORY OF COVID-19: ICD-10-CM

## 2023-12-16 DIAGNOSIS — F12.90 MARIJUANA USE: ICD-10-CM

## 2023-12-16 DIAGNOSIS — R19.7 DIARRHEA, UNSPECIFIED TYPE: ICD-10-CM

## 2023-12-16 LAB
ALBUMIN SERPL-MCNC: 4.9 G/DL (ref 3.5–5.2)
ALBUMIN/GLOB SERPL: 2 G/DL
ALP SERPL-CCNC: 49 U/L (ref 39–117)
ALT SERPL W P-5'-P-CCNC: 10 U/L (ref 1–33)
AMPHET+METHAMPHET UR QL: NEGATIVE
AMPHETAMINES UR QL: NEGATIVE
ANION GAP SERPL CALCULATED.3IONS-SCNC: 12 MMOL/L (ref 5–15)
AST SERPL-CCNC: 16 U/L (ref 1–32)
BACTERIA UR QL AUTO: ABNORMAL /HPF
BARBITURATES UR QL SCN: NEGATIVE
BASOPHILS # BLD AUTO: 0.02 10*3/MM3 (ref 0–0.2)
BASOPHILS NFR BLD AUTO: 0.2 % (ref 0–1.5)
BENZODIAZ UR QL SCN: NEGATIVE
BILIRUB SERPL-MCNC: 0.6 MG/DL (ref 0–1.2)
BILIRUB UR QL STRIP: NEGATIVE
BUN SERPL-MCNC: 13 MG/DL (ref 6–20)
BUN/CREAT SERPL: 24.1 (ref 7–25)
BUPRENORPHINE SERPL-MCNC: NEGATIVE NG/ML
CALCIUM SPEC-SCNC: 9.3 MG/DL (ref 8.6–10.5)
CANNABINOIDS SERPL QL: POSITIVE
CHLORIDE SERPL-SCNC: 104 MMOL/L (ref 98–107)
CLARITY UR: CLEAR
CO2 SERPL-SCNC: 26 MMOL/L (ref 22–29)
COCAINE UR QL: NEGATIVE
COLOR UR: ABNORMAL
CREAT SERPL-MCNC: 0.54 MG/DL (ref 0.57–1)
D-LACTATE SERPL-SCNC: 1.3 MMOL/L (ref 0.5–2)
DEPRECATED RDW RBC AUTO: 44.5 FL (ref 37–54)
EGFRCR SERPLBLD CKD-EPI 2021: 135.4 ML/MIN/1.73
EOSINOPHIL # BLD AUTO: 0.12 10*3/MM3 (ref 0–0.4)
EOSINOPHIL NFR BLD AUTO: 0.9 % (ref 0.3–6.2)
ERYTHROCYTE [DISTWIDTH] IN BLOOD BY AUTOMATED COUNT: 15.3 % (ref 12.3–15.4)
FENTANYL UR-MCNC: NEGATIVE NG/ML
FLUAV RNA RESP QL NAA+PROBE: NOT DETECTED
FLUBV RNA RESP QL NAA+PROBE: NOT DETECTED
GLOBULIN UR ELPH-MCNC: 2.5 GM/DL
GLUCOSE SERPL-MCNC: 124 MG/DL (ref 65–99)
GLUCOSE UR STRIP-MCNC: NEGATIVE MG/DL
HCG SERPL QL: NEGATIVE
HCT VFR BLD AUTO: 38.4 % (ref 34–46.6)
HGB BLD-MCNC: 11.9 G/DL (ref 12–15.9)
HGB UR QL STRIP.AUTO: NEGATIVE
HYALINE CASTS UR QL AUTO: ABNORMAL /LPF
IMM GRANULOCYTES # BLD AUTO: 0.03 10*3/MM3 (ref 0–0.05)
IMM GRANULOCYTES NFR BLD AUTO: 0.2 % (ref 0–0.5)
KETONES UR QL STRIP: NEGATIVE
LEUKOCYTE ESTERASE UR QL STRIP.AUTO: ABNORMAL
LIPASE SERPL-CCNC: 33 U/L (ref 13–60)
LYMPHOCYTES # BLD AUTO: 1.11 10*3/MM3 (ref 0.7–3.1)
LYMPHOCYTES NFR BLD AUTO: 8.8 % (ref 19.6–45.3)
MCH RBC QN AUTO: 24.9 PG (ref 26.6–33)
MCHC RBC AUTO-ENTMCNC: 31 G/DL (ref 31.5–35.7)
MCV RBC AUTO: 80.5 FL (ref 79–97)
METHADONE UR QL SCN: NEGATIVE
MONOCYTES # BLD AUTO: 0.51 10*3/MM3 (ref 0.1–0.9)
MONOCYTES NFR BLD AUTO: 4 % (ref 5–12)
NEUTROPHILS NFR BLD AUTO: 10.86 10*3/MM3 (ref 1.7–7)
NEUTROPHILS NFR BLD AUTO: 85.9 % (ref 42.7–76)
NITRITE UR QL STRIP: NEGATIVE
NRBC BLD AUTO-RTO: 0 /100 WBC (ref 0–0.2)
OPIATES UR QL: NEGATIVE
OXYCODONE UR QL SCN: NEGATIVE
PCP UR QL SCN: NEGATIVE
PH UR STRIP.AUTO: >=9 [PH] (ref 5–8)
PLATELET # BLD AUTO: 382 10*3/MM3 (ref 140–450)
PMV BLD AUTO: 10 FL (ref 6–12)
POTASSIUM SERPL-SCNC: 4 MMOL/L (ref 3.5–5.2)
PROT SERPL-MCNC: 7.4 G/DL (ref 6–8.5)
PROT UR QL STRIP: ABNORMAL
RBC # BLD AUTO: 4.77 10*6/MM3 (ref 3.77–5.28)
RBC # UR STRIP: ABNORMAL /HPF
REF LAB TEST METHOD: ABNORMAL
SARS-COV-2 RNA RESP QL NAA+PROBE: DETECTED
SODIUM SERPL-SCNC: 142 MMOL/L (ref 136–145)
SP GR UR STRIP: >=1.03 (ref 1–1.03)
SQUAMOUS #/AREA URNS HPF: ABNORMAL /HPF
TRICYCLICS UR QL SCN: NEGATIVE
UROBILINOGEN UR QL STRIP: ABNORMAL
WBC # UR STRIP: ABNORMAL /HPF
WBC NRBC COR # BLD AUTO: 12.65 10*3/MM3 (ref 3.4–10.8)

## 2023-12-16 PROCEDURE — 80053 COMPREHEN METABOLIC PANEL: CPT

## 2023-12-16 PROCEDURE — 25010000002 PROCHLORPERAZINE 10 MG/2ML SOLUTION

## 2023-12-16 PROCEDURE — 87636 SARSCOV2 & INF A&B AMP PRB: CPT

## 2023-12-16 PROCEDURE — 25810000003 SODIUM CHLORIDE 0.9 % SOLUTION

## 2023-12-16 PROCEDURE — 25010000002 ONDANSETRON PER 1 MG

## 2023-12-16 PROCEDURE — 81001 URINALYSIS AUTO W/SCOPE: CPT

## 2023-12-16 PROCEDURE — 83605 ASSAY OF LACTIC ACID: CPT

## 2023-12-16 PROCEDURE — 99283 EMERGENCY DEPT VISIT LOW MDM: CPT

## 2023-12-16 PROCEDURE — 83690 ASSAY OF LIPASE: CPT

## 2023-12-16 PROCEDURE — 96375 TX/PRO/DX INJ NEW DRUG ADDON: CPT

## 2023-12-16 PROCEDURE — 80307 DRUG TEST PRSMV CHEM ANLYZR: CPT

## 2023-12-16 PROCEDURE — 85025 COMPLETE CBC W/AUTO DIFF WBC: CPT

## 2023-12-16 PROCEDURE — 74018 RADEX ABDOMEN 1 VIEW: CPT

## 2023-12-16 PROCEDURE — 96374 THER/PROPH/DIAG INJ IV PUSH: CPT

## 2023-12-16 PROCEDURE — 84703 CHORIONIC GONADOTROPIN ASSAY: CPT

## 2023-12-16 RX ORDER — PROMETHAZINE HYDROCHLORIDE 25 MG/1
25 SUPPOSITORY RECTAL EVERY 6 HOURS PRN
Qty: 8 SUPPOSITORY | Refills: 0 | Status: SHIPPED | OUTPATIENT
Start: 2023-12-16 | End: 2023-12-18

## 2023-12-16 RX ORDER — SODIUM CHLORIDE 0.9 % (FLUSH) 0.9 %
10 SYRINGE (ML) INJECTION AS NEEDED
Status: DISCONTINUED | OUTPATIENT
Start: 2023-12-16 | End: 2023-12-16 | Stop reason: HOSPADM

## 2023-12-16 RX ORDER — ONDANSETRON 2 MG/ML
4 INJECTION INTRAMUSCULAR; INTRAVENOUS ONCE
Status: COMPLETED | OUTPATIENT
Start: 2023-12-16 | End: 2023-12-16

## 2023-12-16 RX ORDER — PROCHLORPERAZINE EDISYLATE 5 MG/ML
2.5 INJECTION INTRAMUSCULAR; INTRAVENOUS ONCE
Status: COMPLETED | OUTPATIENT
Start: 2023-12-16 | End: 2023-12-16

## 2023-12-16 RX ADMIN — SODIUM CHLORIDE 1000 ML: 9 INJECTION, SOLUTION INTRAVENOUS at 08:40

## 2023-12-16 RX ADMIN — PROCHLORPERAZINE EDISYLATE 2.5 MG: 5 INJECTION INTRAMUSCULAR; INTRAVENOUS at 10:26

## 2023-12-16 RX ADMIN — ONDANSETRON 4 MG: 2 INJECTION INTRAMUSCULAR; INTRAVENOUS at 08:40

## 2023-12-16 NOTE — Clinical Note
[facility] EMERGENCY DEPARTMENT  [address]  [facility] [address]  Phone: [phone]    Rosmery Padilla was seen and treated in our emergency department on 12/16/2023.  She may return to work on 12/19/2023.         Thank you for choosing [facility].    Robe Lopez RN

## 2023-12-16 NOTE — ED PROVIDER NOTES
Subjective   History of Present Illness  Patient is a 18 year old female who presents to the ER with complaints of vomiting and diarrhea. She states that vomiting started overnight. She states she hasn't been able to keep much food or drinks down. She states she feels nauseated still. She has used a Phenergan suppository at home, however it did not help her nausea. She states she feels like she has been having chills and then hot flashes, however she took her temperature at home and it was only 98.6. She states her vomit looks like stomach bile.  She denies any urinary symptoms.  She is not really tender on exam, she states that there is not really much pain but it feels somewhat tender due to her vomiting.         Review of Systems   Gastrointestinal:  Positive for diarrhea, nausea and vomiting.   All other systems reviewed and are negative.      Past Medical History:   Diagnosis Date    ADHD     Allergic     Anxiety     Asthma     Depression        Allergies   Allergen Reactions    Blue Dyes (Parenteral) Rash    Other Rash      Blackberries, cats       Past Surgical History:   Procedure Laterality Date    DILATATION AND CURETTAGE N/A 7/1/2023    Procedure: DILATATION AND CURETTAGE;  Surgeon: Darryl Del Valle MD;  Location: [facility] OR;  Service: Obstetrics/Gynecology;  Laterality: N/A;    FOOT SURGERY         Family History   Problem Relation Age of Onset    Arthritis Mother     Depression Mother     Dementia Mother     Hypertension Mother     Heart attack Father     Diabetes Father     Alcohol abuse Father     Hypertension Maternal Grandmother     Diabetes Maternal Grandfather     Hypertension Maternal Grandfather     Heart disease Maternal Grandfather     Breast cancer Neg Hx     Ovarian cancer Neg Hx     Uterine cancer Neg Hx     Colon cancer Neg Hx        Social History     Socioeconomic History    Marital status: Single   Tobacco Use    Smoking status: Former     Types: Cigarettes     Quit date: 2/24/2020      Years since quitting: 3.8    Smokeless tobacco: Never   Vaping Use    Vaping Use: Every day    Substances: Nicotine   Substance and Sexual Activity    Alcohol use: No    Drug use: Yes     Types: Marijuana     Comment: 8/12/22 last use    Sexual activity: Yes     Partners: Male     Birth control/protection: None           Objective   Physical Exam  Vitals and nursing note reviewed.   Constitutional:       General: She is not in acute distress.     Appearance: Normal appearance. She is normal weight. She is not ill-appearing or toxic-appearing.   HENT:      Head: Normocephalic.   Cardiovascular:      Rate and Rhythm: Normal rate and regular rhythm.      Pulses: Normal pulses.      Heart sounds: Normal heart sounds.   Pulmonary:      Effort: Pulmonary effort is normal.      Breath sounds: Normal breath sounds.   Abdominal:      General: Abdomen is flat. Bowel sounds are normal. There is no distension.      Palpations: Abdomen is soft.      Tenderness: There is no abdominal tenderness. There is no guarding.   Musculoskeletal:         General: Normal range of motion.      Cervical back: Normal range of motion and neck supple.   Skin:     General: Skin is warm and dry.   Neurological:      General: No focal deficit present.      Mental Status: She is alert and oriented to person, place, and time. Mental status is at baseline.   Psychiatric:         Mood and Affect: Mood normal.         Behavior: Behavior normal.         Thought Content: Thought content normal.         Judgment: Judgment normal.         Procedures           ED Course                                             Medical Decision Making  Rosmery Padilla is a very pleasant 18 y.o. female who presents to the emergency department for vomiting and diarrhea.     Patient was non-toxic and not-ill appearing on arrival. Vital signs stable, slightly tachycardic.  Afebrile.     Patient's presentation raises suspicion for differentials including, but not limited  to, gastroenteritis, urinary tract infection, marijuana use, electrolyte imbalance, dehydration.     External (non-ED) record review: None    Given this, Rosmery was placed on the monitor. Laboratory studies and imaging studies were ordered including ABC, CMP, lipase, lactic acid, hCG qualitative, urinalysis, urine drug screen, COVID/flu swab, KUB.     Rosmery was given IV fluids, IV Zofran, IV Compazine for symptomatic relief.    Imaging was reviewed by radiologist.  KUB negative for acute findings.    Labs were reviewed.  CBC with mild leukocytosis, hemoglobin 11.9, hematocrit 38.4.  CMP unremarkable.  Lipase normal.  Lactic acid normal.  Negative hCG.  Positive for THC.  Positive for COVID, patient did test positive earlier this month.  Negative for flu.  Urinalysis with no bacteria, 0-2 WBCs, 7-12 squamous epithelial cells.  Patient did not report any urinary symptoms.  Likely contaminant at this time.  On re-evaluation, patient remained hemodynamically stable and appeared to be comfortable and in no acute distress.    Given patient's symptoms, patient's presentation could likely be related to a viral illness or marijuana use.  I did encourage her to stop using marijuana.  She said that she thinks that she could be having withdrawals from this.  Encouraged her to stay well-hydrated at home and follow-up with primary care provider.  I discussed all of the lab and imaging results with the patient during this visit in the emergency department. I answered all the questions regarding the emergency department evaluation, diagnosis, and treatment plan. We talked about how crucial it is for the patient to follow up by calling their primary care provider as soon as possible to schedule an appointment for within the next few days or as soon as possible so that the symptoms can be reassessed to see if they have improved or to answer any additional questions. I also provided the patient with advice on returning safely and  urged the patient to visit the emergency department right away if any worsening or new symptoms appeared. The patient verbalized understanding of the discharge instructions and agreed with them. Rosmery was discharged in stable condition.    Signed by:   ALEJANDRA Clark 12/16/2023 10:26 CST     Dragon disclaimer:  Part of this note may be an electronic transcription/translation of spoken language to printed text using the Dragon Dictation System.    Problems Addressed:  Diarrhea, unspecified type: acute illness or injury  History of COVID-19: acute illness or injury  Marijuana use: acute illness or injury  Nausea and vomiting, unspecified vomiting type: acute illness or injury    Amount and/or Complexity of Data Reviewed  Labs: ordered.  Radiology: ordered.    Risk  Prescription drug management.        Final diagnoses:   Nausea and vomiting, unspecified vomiting type   Diarrhea, unspecified type   Marijuana use   History of COVID-19       ED Disposition  ED Disposition       ED Disposition   Discharge    Condition   Stable    Comment   --               Shirin Nuno, APRN  [address] [id number] [phone]    Schedule an appointment as soon as possible for a visit in 1 day      [facility] EMERGENCY DEPARTMENT  [address] [place], [place] [address] [phone]  Go to   If symptoms worsen         Where to Get Your Medications        These medications were sent to [facility]/pharmacy #[id number] - [place], [place] - [address] AT ACROSS FROM [address] - [phone]  - [phone]   [address] [facility] [address]      Phone: [phone]   promethazine 25 MG suppository          Medication List      No changes were made to your prescriptions during this visit.            Eli Johnson APRN  12/16/23 1032

## 2023-12-16 NOTE — DISCHARGE INSTRUCTIONS
It was very nice to meet you, Rosmery. Thank you for allowing us to take care of you today at [facility].    Your evaluation today did not show any emergent findings or have any emergent indications for admission to the hospital.     Please understand that an ER evaluation is just the start of your evaluation. We will do what we can, but we are often unable to fully figure out what is causing your symptoms from one evaluation. Thus, our primary goal is to determine whether you need to be evaluated in the hospital or if it is safe for you to go home and see other doctors such as a primary care physician or a specialist on an outpatient basis.     Like we discussed, it is VERY IMPORTANT that you follow up with your primary care doctor (call them to set up an appointment) within the next few days or as soon as possible so that you can be re-evaluated for improvement in your symptoms or for any other questions.     Please return to the emergency room within 12-48 hours if you experience any new or worsening symptoms.

## 2024-01-18 LAB
BACTERIA SPEC AEROBE CULT: NO GROWTH
C TRACH RRNA SPEC DONR QL NAA+PROBE: NEGATIVE
CANNABINOIDS SERPL QL: NEGATIVE
COCAINE SERPL CFM-MCNC: NEGATIVE NG/ML
EXTERNAL ABO GROUPING: NORMAL
EXTERNAL AMPHETAMINE SCREEN URINE: NEGATIVE
EXTERNAL ANTIBODY SCREEN: NORMAL
EXTERNAL BARBITURATE SCREEN URINE: NEGATIVE
EXTERNAL BENZODIAZEPINE SCREEN URINE: NEGATIVE
EXTERNAL HEMATOCRIT: 32 %
EXTERNAL HEMOGLOBIN: 10.3 G/DL
EXTERNAL HEPATITIS B SURFACE ANTIGEN: NEGATIVE
EXTERNAL METHADONE SCREEN URINE: NEGATIVE
EXTERNAL PHENCYCLIDINE SCREEN URINE: NEGATIVE
EXTERNAL PLATELET COUNT: 300 K/ΜL
EXTERNAL RH FACTOR: POSITIVE
EXTERNAL SYPHILIS RPR SCREEN: NORMAL
HCV AB S/CO SERPL IA: NORMAL
HIV 1+2 AB+HIV1 P24 AG SERPL QL IA: NORMAL
N GONORRHOEA DNA SPEC QL NAA+PROBE: NEGATIVE
OPIATES UR QL: NEGATIVE
RUBV IGG SERPL IA-ACNC: NORMAL
VZV IGG SER QL: 579.3

## 2024-02-22 LAB — EXTERNAL NIPT: NORMAL

## 2024-03-06 ENCOUNTER — TELEPHONE (OUTPATIENT)
Dept: OBSTETRICS AND GYNECOLOGY | Age: 19
End: 2024-03-06

## 2024-03-06 NOTE — TELEPHONE ENCOUNTER
Hub staff attempted to follow warm transfer process and was unsuccessful     Caller: Rosmery Padilla    Relationship to patient: Self    Best call back number: [phone]    Patient is needing: EST OB PT OF DR. CHAWLA. 12 WEEKS PREGNANT. CALLING TO SCHEDULE APPT WITH OFFICE. PT REQUESTING TO SCHEDULE WITH DR. BUSTAMANTE

## 2024-03-28 ENCOUNTER — INITIAL PRENATAL (OUTPATIENT)
Age: 19
End: 2024-03-28
Payer: COMMERCIAL

## 2024-03-28 VITALS — BODY MASS INDEX: 22.94 KG/M2 | WEIGHT: 106 LBS | DIASTOLIC BLOOD PRESSURE: 64 MMHG | SYSTOLIC BLOOD PRESSURE: 98 MMHG

## 2024-03-28 DIAGNOSIS — Z3A.15 15 WEEKS GESTATION OF PREGNANCY: Primary | ICD-10-CM

## 2024-03-28 DIAGNOSIS — Z34.82 ENCOUNTER FOR SUPERVISION OF OTHER NORMAL PREGNANCY IN SECOND TRIMESTER: ICD-10-CM

## 2024-03-28 RX ORDER — DOXYLAMINE SUCCINATE AND PYRIDOXINE HYDROCHLORIDE 20; 20 MG/1; MG/1
1 TABLET, EXTENDED RELEASE ORAL
COMMUNITY
Start: 2024-01-18

## 2024-03-28 RX ORDER — PRENATAL WITH FERROUS FUM AND FOLIC ACID 3080; 920; 120; 400; 22; 1.84; 3; 20; 10; 1; 12; 200; 27; 25; 2 [IU]/1; [IU]/1; MG/1; [IU]/1; MG/1; MG/1; MG/1; MG/1; MG/1; MG/1; UG/1; MG/1; MG/1; MG/1; MG/1
1 TABLET ORAL DAILY
COMMUNITY
Start: 2024-01-18

## 2024-03-28 NOTE — PROGRESS NOTES
Feeling well  Transfer from Dr Willard  Records reviewed, Hgb 10.3, taking iron supplement  Prior  x 2  Last pregnancy was 14 week SAB requiring D&C for hemorrhage  Schedule anatomy US    Diagnoses and all orders for this visit:    1. 15 weeks gestation of pregnancy (Primary)  -     Ambulatory Referral to MFM/Perinatology    2. Encounter for supervision of other normal pregnancy in second trimester

## 2024-04-25 ENCOUNTER — ROUTINE PRENATAL (OUTPATIENT)
Age: 19
End: 2024-04-25
Payer: COMMERCIAL

## 2024-04-25 VITALS — WEIGHT: 107 LBS | SYSTOLIC BLOOD PRESSURE: 110 MMHG | DIASTOLIC BLOOD PRESSURE: 72 MMHG | BODY MASS INDEX: 23.15 KG/M2

## 2024-04-25 DIAGNOSIS — Z34.82 ENCOUNTER FOR SUPERVISION OF OTHER NORMAL PREGNANCY IN SECOND TRIMESTER: Primary | ICD-10-CM

## 2024-04-25 NOTE — PROGRESS NOTES
Starting to feel fetal movement  Feeling well today  Had gastroenteritis last weekend  Schedule anatomy US 4/30    Diagnoses and all orders for this visit:    1. Encounter for supervision of other normal pregnancy in second trimester (Primary)

## 2024-05-10 ENCOUNTER — TELEPHONE (OUTPATIENT)
Dept: OBSTETRICS AND GYNECOLOGY | Age: 19
End: 2024-05-10
Payer: COMMERCIAL

## 2024-06-02 ENCOUNTER — NURSE TRIAGE (OUTPATIENT)
Dept: CALL CENTER | Facility: HOSPITAL | Age: 19
End: 2024-06-02
Payer: COMMERCIAL

## 2024-06-02 NOTE — TELEPHONE ENCOUNTER
Pregnant 25 weeks  Dr Del Valle is her Ob/GYN    Has been having trouble breathing today, more than usual.  Didn't know when she should be concerned,  Baby moving normally.  No pain no bleeding.  Care advice given per guideline.  Reason for Disposition   [1] Difficulty breathing or shortness of breath AND [2] new onset or worsening    Additional Information   Negative: Severe vaginal bleeding (e.g., continuous red blood from vagina, or large blood clots)   Negative: Shock suspected (very weak, limp, too weak to stand, pale cool skin)   Negative: Severe difficulty breathing (e.g., struggling for each breath, speaks in single words)   Negative: Umbilical cord hanging out of the vagina (shiny, white, curled appearance)   Negative: Uncontrollable urge to push (i.e., feels like baby is coming out now) or can see baby   Negative: Seizure occurred and has stopped   Negative: Sounds like a life-threatening emergency to the triager   Negative: [1] Teen is pregnant AND [2] ADULT pregnancy guidelines are available,  go to the appropriate ADULT pregnancy symptom guideline   Negative: [1] Pregnancy suspected BUT [2] no positive pregnancy test AND [3] abdominal pain   Negative: [1] Pregnancy suspected BUT [2] no positive pregnancy test AND [3] vaginal bleeding   Negative: [1] Pregnancy suspected BUT [2] no positive pregnancy test AND [3] no urgent symptoms   Negative: [1] Pregnant AND [2] abdominal injury or MVA   Negative: [1] Leakage of clear fluid from vagina AND [2] less than 37 weeks   Negative: Active labor suspected (e.g., painful, frequent contractions)   Negative: [1] Abdominal pain AND [2] MODERATE or SEVERE (pain interferes with normal activities or awakens from sleep)   Negative: [1] Vaginal bleeding AND [2] has placenta previa   Negative: Vaginal bleeding (Exception: Single episode of mild spotting after wiping, intercourse or pelvic exam)   Negative: [1] Baby moving less today AND [2] more than 28 weeks pregnant    "Negative: Chest pain    Answer Assessment - Initial Assessment Questions  1. PREGNANCY: \"Do you know how many weeks or months pregnant you are?\"     25 weeks  2. HALLEY: \"What date are you expecting to deliver?\"      09/25/2024  3. BLOOD PRESSURE: \"Do you have high blood pressure before or during this pregnancy?\" (Note to Triager: High blood pressure may elevate acuity with minor symptoms)  unable to check B/p  4. PLACENTA LOCATION: \"Is your placenta abnormally placed?\" (Note to Triager: Placenta previa patients have higher risk for severe bleeding and should be sent in now for any bleeding)      *No Answer*  5. MAIN SYMPTOM: \"What are you most concerned about?\" When did it start?\"  Feels more short of breath has felt this way all day  6. VAGINAL BLEEDING: If present, ask: \"How bad is it?\" (Mild, Moderate or Severe. See Severity definitions).   no  7. ABDOMINAL PAIN: If present, ask: \"How bad is it?\" (Mild, Moderate or Severe. See Severity definitions).      no  8. VOMITING: If present, ask: \"How bad is it?\" (Mild, Moderate or Severe. See Severity definitions).    no  9. LEG SWELLING (EDEMA): If present, ask: \"How bad is it?\" (Mild, Moderate or Severe. See Severity definitions).   Doesn't think so  10. FEVER: \"Do you have a fever?\" If so, ask: \"What is it, how was it measured, and when did it start?\"        no   11. OTHER SYMPTOMS: \"Do you have any other symptoms?\" (e.g., vision changes, chest pain, trouble breathing)     Trouble breathing    Protocols used: Pregnancy [id number]    "

## 2024-06-03 ENCOUNTER — TELEPHONE (OUTPATIENT)
Dept: OBSTETRICS AND GYNECOLOGY | Age: 19
End: 2024-06-03
Payer: COMMERCIAL

## 2024-06-03 NOTE — TELEPHONE ENCOUNTER
"Pt calling with concerns of feeling lightheaded and dizzy. Pt states she frequently has dizzy spells and headaches. When asking about pt's eating habits, she does state that she forgets to eat. I explained the importance of not skipping meals and incorporating plenty of protein into her diet. Pt advised to keep snacks with her and try to eat something small at least every couple of hours as well as eating 3 meals a day. Pt also states that she could improve with drinking water. Pt states that drinking \"sugary\" stuff makes her sick. Pt advised on drinking lots of water and to try the Body Armor lyte drinks as those do not have as much sugar in them. Pt voiced understanding of recommendations  "

## 2024-06-07 ENCOUNTER — ROUTINE PRENATAL (OUTPATIENT)
Age: 19
End: 2024-06-07
Payer: COMMERCIAL

## 2024-06-07 VITALS — BODY MASS INDEX: 24.24 KG/M2 | SYSTOLIC BLOOD PRESSURE: 106 MMHG | DIASTOLIC BLOOD PRESSURE: 64 MMHG | WEIGHT: 112 LBS

## 2024-06-07 DIAGNOSIS — Z3A.25 25 WEEKS GESTATION OF PREGNANCY: Primary | ICD-10-CM

## 2024-06-07 DIAGNOSIS — Z34.82 MULTIGRAVIDA IN SECOND TRIMESTER: ICD-10-CM

## 2024-06-07 DIAGNOSIS — O28.3 NUCHAL FOLD THICKENING ON PRENATAL ULTRASOUND: ICD-10-CM

## 2024-06-07 DIAGNOSIS — Z87.59 HISTORY OF VACUUM EXTRACTION ASSISTED DELIVERY: ICD-10-CM

## 2024-06-07 PROBLEM — Z34.90 PREGNANCY: Status: ACTIVE | Noted: 2024-06-07

## 2024-06-07 PROBLEM — O03.4 INCOMPLETE ABORTION: Status: RESOLVED | Noted: 2023-07-01 | Resolved: 2024-06-07

## 2024-06-07 PROBLEM — O03.1 ABORTION, INCOMPLETE WITH HEMORRHAGE: Status: RESOLVED | Noted: 2023-07-02 | Resolved: 2024-06-07

## 2024-06-07 LAB
GLUCOSE UR STRIP-MCNC: NEGATIVE MG/DL
PROT UR STRIP-MCNC: NEGATIVE MG/DL

## 2024-06-07 NOTE — PROGRESS NOTES
Reason for visit: Routine OB visit at 25w5d     CC:  She has had some dizzy spells. Endorses good fetal movement. Denies VB, LOF, contractions, h/a, visual changes, and right upper quadrant pain.     ROS: All systems reviewed and are negative with exception of the following: amenorrhea, dizziness, fatigue    Weight 50.8 kg (112 lb); /64; ; FH 25 cm   Total weight gain: 9.979 kg (22 lb) with Total weight gain expected 11.5 kg (25 lb)-16 kg (35 lb)    Urine today and reviewed: negative glucose, negative protein    21-week Anatomy scan: EFW 64%; enlarged nuchal skin fold thickness (8.6 mm); anterior placenta without evidence of placenta previa    Exam:  General Appearance:  No visualized signs of distress. Normal mood and behavior  Cardiorespiratory: HR str and reg. Lungs clear. Resp even and unlabored.  Abdomen: is soft and nontender. No CVA tenderness. Uterus is consistent with gestational age.  Ext: No edema. Calves non-tender.     Impression  Diagnoses and all orders for this visit:    1. 25 weeks gestation of pregnancy (Primary)  -     POC Urinalysis Dipstick    2. Multigravida in second trimester  Discussed second trimester of pregnancy, discomforts and measures of support, fetal movement,  labor warnings, preeclampsia warnings, and signs and symptoms to report. Also discussed with patient plan for hemoglobin and glucose tolerance testing. Instructions on glucose tolerance test and dietary intake prior to the test provided. Patient to notify provider or come to the hospital with vaginal bleeding, leaking of fluid, contractions, or other concerns. Second Trimester of Pregnancy video and Round Ligament Pain education included in the AVS.    3. Nuchal fold thickening on prenatal ultrasound  Has follow-up appointment with [facility] 2024. NIPT low risk 2024. Reinforced the education from [facility] that with the low risk screening, risks of concern with infant is still approximately 1% range.     4.  History of vacuum extraction assisted delivery        Return to the office in 3 weeks for routine prenatal visit with Dr. Del Valle and as needed with concerns.        This note has been signed electronically.    Corin Villa, JUANITA, APRN, CNM, RNC-OB

## 2024-06-22 PROBLEM — Z28.39 RUBELLA NON-IMMUNE STATUS, ANTEPARTUM: Status: ACTIVE | Noted: 2024-06-07

## 2024-06-22 PROBLEM — O09.899 RUBELLA NON-IMMUNE STATUS, ANTEPARTUM: Status: ACTIVE | Noted: 2024-06-07

## 2024-06-28 ENCOUNTER — ROUTINE PRENATAL (OUTPATIENT)
Age: 19
End: 2024-06-28
Payer: COMMERCIAL

## 2024-06-28 VITALS — WEIGHT: 114 LBS | SYSTOLIC BLOOD PRESSURE: 108 MMHG | BODY MASS INDEX: 24.67 KG/M2 | DIASTOLIC BLOOD PRESSURE: 72 MMHG

## 2024-06-28 DIAGNOSIS — Z34.83 MULTIGRAVIDA IN THIRD TRIMESTER: ICD-10-CM

## 2024-06-28 DIAGNOSIS — Z3A.28 28 WEEKS GESTATION OF PREGNANCY: Primary | ICD-10-CM

## 2024-06-28 LAB
GLUCOSE UR STRIP-MCNC: NEGATIVE MG/DL
PROT UR STRIP-MCNC: NEGATIVE MG/DL

## 2024-06-28 NOTE — PROGRESS NOTES
Reason for visit: Routine OB visit at 28w5d     CC:  Not feeling well, got sick & vomited from the GTT. Endorses regular fetal movement. Denies VB, LOF, contractions, h/a, visual changes, and right upper quadrant pain.     ROS: All systems reviewed and are negative with exception of the following: amenorrhea, nausea    Wt 51.7 kg (114 lb) lb for a TWG of 10.9 kg (24 lb), /72, FHTs 142, FH 28  Urine today and reviewed: neg glucose, neg protein    Anatomy scan: EFW 64%; anterior placenta with no evidence of placenta previa    Exam:  General Appearance:  No visualized signs of distress. Normal mood and behavior  Cardiorespiratory: HR str and reg. Lungs clear. Resp even and unlabored.  Abdomen: is soft and nontender. No CVA tenderness. Uterus is round and soft.  Ext: No edema. Calves non-tender.     Impression  Diagnoses and all orders for this visit:    1. 28 weeks gestation of pregnancy (Primary)  Discussed third trimester of pregnancy, including discomforts and measures of support,  labor warnings, preeclampsia warnings, and signs and symptoms to report. Discussed glucose and hemoglobin screenings today in the clinic. Labs ordered today. Patient to notify provider and/or come to the hospital for vaginal bleeding, leaking of fluid, contractions, or other concerns. Third Trimester of Pregnancy video included in the AVS.   -     POC Urinalysis Dipstick    2. Multigravida in third trimester  -     Hemoglobin  -     RPR        Return to the office in 2 weeks for routine follow up and as needed with concerns.    This note has been signed electronically.  ALEJANDRA Malik, COLLEEN

## 2024-06-29 LAB
HGB BLD-MCNC: 8.4 G/DL (ref 11.1–15.9)
RPR SER QL: NON REACTIVE

## 2024-07-01 ENCOUNTER — TELEPHONE (OUTPATIENT)
Age: 19
End: 2024-07-01
Payer: COMMERCIAL

## 2024-07-01 DIAGNOSIS — Z3A.28 28 WEEKS GESTATION OF PREGNANCY: Primary | ICD-10-CM

## 2024-07-01 NOTE — TELEPHONE ENCOUNTER
Pt also states she was not told an alternate plan for her since she vomited her 1h gtt. Please advise. Do you want her doing at home glucose checks?

## 2024-07-01 NOTE — TELEPHONE ENCOUNTER
Spoke with pt by phone regarding her hemoglobin result. Per Ana Hargrove, if patient is already taking her iron supplement then she will need to come in for infusions since her result was 8.4. Pt states she has not been taking her iron supplement. Pt advised to start taking OTC iron supplement once a day with a source of vitamin c. Do you want her taking it once or twice a day?

## 2024-07-03 NOTE — TELEPHONE ENCOUNTER
Pt informed to take iron supplement BID with source of vitamin c. For glucose testing, she prefers to try 1h gtt again. She will eat breakfast as normal that morning and take nausea medication prior to coming in. Pt informed she does not need an appt and order has been placed. Voiced understanding.

## 2024-07-12 ENCOUNTER — ROUTINE PRENATAL (OUTPATIENT)
Age: 19
End: 2024-07-12
Payer: COMMERCIAL

## 2024-07-12 VITALS — BODY MASS INDEX: 24.45 KG/M2 | SYSTOLIC BLOOD PRESSURE: 96 MMHG | DIASTOLIC BLOOD PRESSURE: 50 MMHG | WEIGHT: 113 LBS

## 2024-07-12 DIAGNOSIS — O99.019 IRON DEFICIENCY ANEMIA DURING PREGNANCY: ICD-10-CM

## 2024-07-12 DIAGNOSIS — Z87.59 HISTORY OF VACUUM EXTRACTION ASSISTED DELIVERY: ICD-10-CM

## 2024-07-12 DIAGNOSIS — Z3A.30 30 WEEKS GESTATION OF PREGNANCY: Primary | ICD-10-CM

## 2024-07-12 DIAGNOSIS — O28.3 NUCHAL FOLD THICKENING ON PRENATAL ULTRASOUND: ICD-10-CM

## 2024-07-12 DIAGNOSIS — Z34.83 MULTIGRAVIDA IN THIRD TRIMESTER: ICD-10-CM

## 2024-07-12 DIAGNOSIS — D50.9 IRON DEFICIENCY ANEMIA DURING PREGNANCY: ICD-10-CM

## 2024-07-12 NOTE — PROGRESS NOTES
Reason for visit: Routine OB visit at 30w5d     CC:  Has been having dizzy spells, especially at work. Endorses regular fetal movement. Denies VB, LOF, contractions, h/a, visual changes, and right upper quadrant pain.     ROS: All systems reviewed and are negative with exception of the following: Dizziness    Wt 51.3 kg (113 lb) lb for a TWG of 10.4 kg (23 lb), BP 96/50, FHTs 132, FH 30  Urine today and reviewed: unable to void    Anatomy scan : EFW: 64%; breech; enlarged nuchal fold; anterior placenta with no evidence of placenta previa    Exam:  General Appearance:  No visualized signs of distress. Normal mood and behavior  Cardiorespiratory: HR str and reg. Lungs clear. Resp even and unlabored.  Abdomen: is soft and nontender. No CVA tenderness. Uterus is round and soft.  Ext: No edema. Calves non-tender.     Impression  Diagnoses and all orders for this visit:    1. 30 weeks gestation of pregnancy (Primary)  Discussed third trimester of pregnancy, discomforts and measures of support, fetal movement counts,  labor warnings, preeclampsia warnings, and signs and symptoms to report. Reviewed glucose tolerance test and hemoglobin results. Patient to notify provider and/or come to the hospital with vaginal bleeding, leaking of fluid, contraction, or other concerns. Third Trimester of Pregnancy video included in the AVS.   -POC Urinalysis Dipstick    2. Multigravida in third trimester    3. Nuchal fold thickening on prenatal ultrasound    4. History of vacuum extraction assisted delivery    5. Iron deficiency anemia during pregnancy  --Has not been taking her iron supplements, but is planning to start taking them.   Education done:  -Your iron level is low. Low iron can contribute to feeling fatigued, dizzy, and short of breath during pregnancy. Some blood loss is expected at the time of birth and you will feel worse/be more at risk if your iron level is very low at that time.   -You need to take an iron  supplement twice daily with a meal. For best absorption it should be taken with a source of vitamin C (oranges, kiwi, bell peppers) but not with dairy.   -You can also improve your iron by increasing dietary sources of iron such as kale, spinach, red meat, eggs, raisins, or lentils. Cooking in a cast iron skillet is another simple way to increase dietary iron.        Return to the office in 2 weeks for routine follow up and as needed with concerns.    This note has been signed electronically.  ALEJANDRA Malik, COLLEEN

## 2024-07-16 ENCOUNTER — TELEPHONE (OUTPATIENT)
Age: 19
End: 2024-07-16
Payer: COMMERCIAL

## 2024-07-16 DIAGNOSIS — D50.9 IRON DEFICIENCY ANEMIA DURING PREGNANCY: Primary | ICD-10-CM

## 2024-07-16 DIAGNOSIS — O99.019 IRON DEFICIENCY ANEMIA DURING PREGNANCY: Primary | ICD-10-CM

## 2024-07-16 RX ORDER — FERRIC MALTOL 30 MG/1
1 CAPSULE ORAL 2 TIMES DAILY
Qty: 60 CAPSULE | Refills: 1 | Status: SHIPPED | OUTPATIENT
Start: 2024-07-16 | End: 2024-09-14

## 2024-07-16 NOTE — TELEPHONE ENCOUNTER
----- Message from Vaishnavi DAS sent at 7/16/2024  7:50 AM CDT -----  Regarding: FW: Appointment Request  Contact: [phone]    ----- Message -----  From: Rosmery Padilla  Sent: 7/16/2024   6:35 AM CDT  To: Sofie Quinn [facility]  Subject: Appointment Request                              Appointment Request From: Rosmery Padilla    With Provider: Darryl Del Valle [[facility]]    Preferred Date Range: Any date 7/16/2024 or later    Preferred Times: Any Time    Reason for visit: In-Office Procedure    Comments:  I took iron pills and they have had me sick all day and I need other option for iron

## 2024-07-17 NOTE — TELEPHONE ENCOUNTER
"I have attempted to reach patient by phone 3x but \"you call cannot go through at this time\", my chart message sent   "

## 2024-07-25 ENCOUNTER — ROUTINE PRENATAL (OUTPATIENT)
Age: 19
End: 2024-07-25
Payer: COMMERCIAL

## 2024-07-25 VITALS — DIASTOLIC BLOOD PRESSURE: 62 MMHG | WEIGHT: 116 LBS | BODY MASS INDEX: 25.1 KG/M2 | SYSTOLIC BLOOD PRESSURE: 112 MMHG

## 2024-07-25 DIAGNOSIS — O99.019 MATERNAL ANEMIA IN PREGNANCY, ANTEPARTUM: Primary | ICD-10-CM

## 2024-07-25 NOTE — PROGRESS NOTES
Good fetal movement  No contractions  Still needs to retry Glucola  Recommend iron supplement for anemia  Discuss Tdap next visit  Recheck Hgb 34-36 weeks   labor precautions    Diagnoses and all orders for this visit:    1. Maternal anemia in pregnancy, antepartum (Primary)  -     Gestational Screen 1 Hr ([facility])

## 2024-08-06 ENCOUNTER — TELEPHONE (OUTPATIENT)
Age: 19
End: 2024-08-06
Payer: COMMERCIAL

## 2024-08-06 NOTE — TELEPHONE ENCOUNTER
Called patient to reschedule missed appointment this afternoon. No answer, left message to call back.

## 2024-08-27 ENCOUNTER — ROUTINE PRENATAL (OUTPATIENT)
Age: 19
End: 2024-08-27
Payer: COMMERCIAL

## 2024-08-27 VITALS — SYSTOLIC BLOOD PRESSURE: 112 MMHG | BODY MASS INDEX: 26.18 KG/M2 | WEIGHT: 121 LBS | DIASTOLIC BLOOD PRESSURE: 70 MMHG

## 2024-08-27 DIAGNOSIS — Z34.83 MULTIGRAVIDA IN THIRD TRIMESTER: ICD-10-CM

## 2024-08-27 DIAGNOSIS — O99.019 MATERNAL ANEMIA IN PREGNANCY, ANTEPARTUM: Primary | ICD-10-CM

## 2024-08-27 NOTE — PROGRESS NOTES
Good fetal movement  Has had a few contractions  Cervix 2-3/50/-3 soft, posterior  GBS and GC/Chl ordered and done  Hgb today  Labor instructions    Diagnoses and all orders for this visit:    1. Maternal anemia in pregnancy, antepartum (Primary)  -     Hemoglobin    2. Multigravida in third trimester  -     Chlamydia trachomatis, Neisseria gonorrhoeae, PCR w/ confirmation - Swab, Cervix  -     Strep B Screen - Swab, Vaginal/Rectum

## 2024-08-28 LAB — HGB BLD-MCNC: 7.8 G/DL (ref 11.1–15.9)

## 2024-09-01 LAB
C TRACH RRNA SPEC QL NAA+PROBE: NEGATIVE
GP B STREP DNA SPEC QL NAA+PROBE: NEGATIVE
N GONORRHOEA RRNA SPEC QL NAA+PROBE: NEGATIVE

## 2024-09-03 ENCOUNTER — ROUTINE PRENATAL (OUTPATIENT)
Age: 19
End: 2024-09-03
Payer: COMMERCIAL

## 2024-09-03 VITALS — DIASTOLIC BLOOD PRESSURE: 82 MMHG | SYSTOLIC BLOOD PRESSURE: 112 MMHG | BODY MASS INDEX: 26.18 KG/M2 | WEIGHT: 121 LBS

## 2024-09-03 DIAGNOSIS — Z34.83 ENCOUNTER FOR SUPERVISION OF OTHER NORMAL PREGNANCY IN THIRD TRIMESTER: ICD-10-CM

## 2024-09-03 DIAGNOSIS — O99.019 MATERNAL ANEMIA IN PREGNANCY, ANTEPARTUM: Primary | ICD-10-CM

## 2024-09-03 PROCEDURE — 99213 OFFICE O/P EST LOW 20 MIN: CPT | Performed by: OBSTETRICS & GYNECOLOGY

## 2024-09-03 NOTE — PROGRESS NOTES
Good fetal movement  No contractions  Hgb 7.8 down from 8.4  Cervix 4/60/-2 soft, anterior  Reviewed GBS negative  Labor instructions    Diagnoses and all orders for this visit:    1. Maternal anemia in pregnancy, antepartum (Primary)    2. Encounter for supervision of other normal pregnancy in third trimester

## 2024-09-09 ENCOUNTER — ANESTHESIA (OUTPATIENT)
Dept: LABOR AND DELIVERY | Facility: HOSPITAL | Age: 19
End: 2024-09-09
Payer: COMMERCIAL

## 2024-09-09 ENCOUNTER — ANESTHESIA EVENT (OUTPATIENT)
Dept: LABOR AND DELIVERY | Facility: HOSPITAL | Age: 19
End: 2024-09-09
Payer: COMMERCIAL

## 2024-09-09 ENCOUNTER — HOSPITAL ENCOUNTER (INPATIENT)
Facility: HOSPITAL | Age: 19
LOS: 2 days | Discharge: HOME | End: 2024-09-11
Attending: OBSTETRICS & GYNECOLOGY | Admitting: OBSTETRICS & GYNECOLOGY
Payer: COMMERCIAL

## 2024-09-09 PROBLEM — O26.893 VAGINAL DISCHARGE DURING PREGNANCY, THIRD TRIMESTER: Status: ACTIVE | Noted: 2024-09-09

## 2024-09-09 PROBLEM — N89.8 VAGINAL DISCHARGE DURING PREGNANCY, THIRD TRIMESTER: Status: ACTIVE | Noted: 2024-09-09

## 2024-09-09 PROBLEM — N89.8 VAGINAL DISCHARGE DURING PREGNANCY, THIRD TRIMESTER: Status: RESOLVED | Noted: 2024-09-09 | Resolved: 2024-09-09

## 2024-09-09 PROBLEM — O26.893 VAGINAL DISCHARGE DURING PREGNANCY, THIRD TRIMESTER: Status: RESOLVED | Noted: 2024-09-09 | Resolved: 2024-09-09

## 2024-09-09 PROBLEM — Z3A.39 39 WEEKS GESTATION OF PREGNANCY: Status: ACTIVE | Noted: 2024-09-09

## 2024-09-09 PROBLEM — O42.00 PROM WITH ONSET OF LABOR WITHIN 24 HOURS OF RUPTURE: Status: ACTIVE | Noted: 2024-09-09

## 2024-09-09 LAB
ABO GROUP BLD: NORMAL
ACANTHOCYTES BLD QL SMEAR: ABNORMAL
ANISOCYTOSIS BLD QL: ABNORMAL
BASOPHILS # BLD MANUAL: 0 10*3/MM3 (ref 0–0.2)
BASOPHILS NFR BLD MANUAL: 0 % (ref 0–1.5)
BLD GP AB SCN SERPL QL: NEGATIVE
DEPRECATED RDW RBC AUTO: 41.4 FL (ref 37–54)
EOSINOPHIL # BLD MANUAL: 0.16 10*3/MM3 (ref 0–0.4)
EOSINOPHIL NFR BLD MANUAL: 1 % (ref 0.3–6.2)
ERYTHROCYTE [DISTWIDTH] IN BLOOD BY AUTOMATED COUNT: 16.1 % (ref 12.3–15.4)
HCT VFR BLD AUTO: 27.1 % (ref 34–46.6)
HGB BLD-MCNC: 8.2 G/DL (ref 12–15.9)
LYMPHOCYTES # BLD MANUAL: 3.75 10*3/MM3 (ref 0.7–3.1)
LYMPHOCYTES NFR BLD MANUAL: 3 % (ref 5–12)
MCH RBC QN AUTO: 21.5 PG (ref 26.6–33)
MCHC RBC AUTO-ENTMCNC: 30.3 G/DL (ref 31.5–35.7)
MCV RBC AUTO: 71.1 FL (ref 79–97)
MICROCYTES BLD QL: ABNORMAL
MONOCYTES # BLD: 0.49 10*3/MM3 (ref 0.1–0.9)
NEUTROPHILS # BLD AUTO: 11.91 10*3/MM3 (ref 1.7–7)
NEUTROPHILS NFR BLD MANUAL: 73 % (ref 42.7–76)
PLAT MORPH BLD: NORMAL
PLATELET # BLD AUTO: 280 10*3/MM3 (ref 140–450)
PMV BLD AUTO: 10.9 FL (ref 6–12)
POIKILOCYTOSIS BLD QL SMEAR: ABNORMAL
POLYCHROMASIA BLD QL SMEAR: ABNORMAL
RBC # BLD AUTO: 3.81 10*6/MM3 (ref 3.77–5.28)
RH BLD: POSITIVE
ROULEAUX BLD QL SMEAR: ABNORMAL
T&S EXPIRATION DATE: NORMAL
TARGETS BLD QL SMEAR: ABNORMAL
TREPONEMA PALLIDUM IGG+IGM AB [PRESENCE] IN SERUM OR PLASMA BY IMMUNOASSAY: NORMAL
VARIANT LYMPHS NFR BLD MANUAL: 20 % (ref 19.6–45.3)
VARIANT LYMPHS NFR BLD MANUAL: 3 % (ref 0–5)
WBC MORPH BLD: NORMAL
WBC NRBC COR # BLD AUTO: 16.31 10*3/MM3 (ref 3.4–10.8)

## 2024-09-09 PROCEDURE — 86900 BLOOD TYPING SEROLOGIC ABO: CPT | Performed by: OBSTETRICS & GYNECOLOGY

## 2024-09-09 PROCEDURE — 25810000003 LACTATED RINGERS PER 1000 ML: Performed by: OBSTETRICS & GYNECOLOGY

## 2024-09-09 PROCEDURE — 25010000002 ONDANSETRON PER 1 MG: Performed by: OBSTETRICS & GYNECOLOGY

## 2024-09-09 PROCEDURE — 59410 OBSTETRICAL CARE: CPT | Performed by: OBSTETRICS & GYNECOLOGY

## 2024-09-09 PROCEDURE — 86901 BLOOD TYPING SEROLOGIC RH(D): CPT | Performed by: OBSTETRICS & GYNECOLOGY

## 2024-09-09 PROCEDURE — 25010000002 BUTORPHANOL PER 1 MG: Performed by: OBSTETRICS & GYNECOLOGY

## 2024-09-09 PROCEDURE — 86850 RBC ANTIBODY SCREEN: CPT | Performed by: OBSTETRICS & GYNECOLOGY

## 2024-09-09 PROCEDURE — 85025 COMPLETE CBC W/AUTO DIFF WBC: CPT | Performed by: OBSTETRICS & GYNECOLOGY

## 2024-09-09 PROCEDURE — C1755 CATHETER, INTRASPINAL: HCPCS

## 2024-09-09 PROCEDURE — 85007 BL SMEAR W/DIFF WBC COUNT: CPT | Performed by: OBSTETRICS & GYNECOLOGY

## 2024-09-09 PROCEDURE — 25010000002 ROPIVACAINE PER 1 MG

## 2024-09-09 PROCEDURE — 86780 TREPONEMA PALLIDUM: CPT | Performed by: OBSTETRICS & GYNECOLOGY

## 2024-09-09 PROCEDURE — 51702 INSERT TEMP BLADDER CATH: CPT

## 2024-09-09 RX ORDER — EPHEDRINE SULFATE 50 MG/ML
10 INJECTION, SOLUTION INTRAVENOUS
Status: DISCONTINUED | OUTPATIENT
Start: 2024-09-09 | End: 2024-09-09 | Stop reason: HOSPADM

## 2024-09-09 RX ORDER — ONDANSETRON 2 MG/ML
4 INJECTION INTRAMUSCULAR; INTRAVENOUS EVERY 6 HOURS PRN
Status: DISCONTINUED | OUTPATIENT
Start: 2024-09-09 | End: 2024-09-09 | Stop reason: SDUPTHER

## 2024-09-09 RX ORDER — ONDANSETRON 4 MG/1
4 TABLET, ORALLY DISINTEGRATING ORAL EVERY 6 HOURS PRN
Status: DISCONTINUED | OUTPATIENT
Start: 2024-09-09 | End: 2024-09-09 | Stop reason: SDUPTHER

## 2024-09-09 RX ORDER — MISOPROSTOL 200 UG/1
800 TABLET ORAL AS NEEDED
Status: DISCONTINUED | OUTPATIENT
Start: 2024-09-09 | End: 2024-09-09 | Stop reason: HOSPADM

## 2024-09-09 RX ORDER — LIDOCAINE HYDROCHLORIDE AND EPINEPHRINE 15; 5 MG/ML; UG/ML
INJECTION, SOLUTION EPIDURAL AS NEEDED
Status: DISCONTINUED | OUTPATIENT
Start: 2024-09-09 | End: 2024-09-09 | Stop reason: SURG

## 2024-09-09 RX ORDER — SODIUM CHLORIDE, SODIUM LACTATE, POTASSIUM CHLORIDE, CALCIUM CHLORIDE 600; 310; 30; 20 MG/100ML; MG/100ML; MG/100ML; MG/100ML
125 INJECTION, SOLUTION INTRAVENOUS CONTINUOUS
Status: DISCONTINUED | OUTPATIENT
Start: 2024-09-09 | End: 2024-09-11 | Stop reason: HOSPADM

## 2024-09-09 RX ORDER — ONDANSETRON 4 MG/1
4 TABLET, ORALLY DISINTEGRATING ORAL EVERY 6 HOURS PRN
Status: DISCONTINUED | OUTPATIENT
Start: 2024-09-09 | End: 2024-09-09 | Stop reason: HOSPADM

## 2024-09-09 RX ORDER — OXYTOCIN/0.9 % SODIUM CHLORIDE 30/500 ML
999 PLASTIC BAG, INJECTION (ML) INTRAVENOUS ONCE
Status: DISCONTINUED | OUTPATIENT
Start: 2024-09-09 | End: 2024-09-09 | Stop reason: HOSPADM

## 2024-09-09 RX ORDER — EPHEDRINE SULFATE 50 MG/ML
10 INJECTION, SOLUTION INTRAVENOUS AS NEEDED
Status: DISCONTINUED | OUTPATIENT
Start: 2024-09-09 | End: 2024-09-11 | Stop reason: HOSPADM

## 2024-09-09 RX ORDER — METHYLERGONOVINE MALEATE 0.2 MG/ML
200 INJECTION INTRAVENOUS ONCE AS NEEDED
Status: DISCONTINUED | OUTPATIENT
Start: 2024-09-09 | End: 2024-09-09 | Stop reason: HOSPADM

## 2024-09-09 RX ORDER — CARBOPROST TROMETHAMINE 250 UG/ML
250 INJECTION, SOLUTION INTRAMUSCULAR AS NEEDED
Status: DISCONTINUED | OUTPATIENT
Start: 2024-09-09 | End: 2024-09-09 | Stop reason: HOSPADM

## 2024-09-09 RX ORDER — EPHEDRINE SULFATE 50 MG/ML
INJECTION, SOLUTION INTRAVENOUS
Status: COMPLETED
Start: 2024-09-09 | End: 2024-09-09

## 2024-09-09 RX ORDER — CALCIUM CARBONATE 500 MG/1
2 TABLET, CHEWABLE ORAL 3 TIMES DAILY PRN
Status: DISCONTINUED | OUTPATIENT
Start: 2024-09-09 | End: 2024-09-09 | Stop reason: HOSPADM

## 2024-09-09 RX ORDER — ROPIVACAINE HYDROCHLORIDE 2 MG/ML
INJECTION, SOLUTION EPIDURAL; INFILTRATION; PERINEURAL AS NEEDED
Status: DISCONTINUED | OUTPATIENT
Start: 2024-09-09 | End: 2024-09-09 | Stop reason: SURG

## 2024-09-09 RX ORDER — IBUPROFEN 600 MG/1
600 TABLET, FILM COATED ORAL EVERY 6 HOURS PRN
Status: DISCONTINUED | OUTPATIENT
Start: 2024-09-09 | End: 2024-09-09 | Stop reason: HOSPADM

## 2024-09-09 RX ORDER — OXYTOCIN/0.9 % SODIUM CHLORIDE 30/500 ML
2-20 PLASTIC BAG, INJECTION (ML) INTRAVENOUS
Status: DISCONTINUED | OUTPATIENT
Start: 2024-09-09 | End: 2024-09-11 | Stop reason: HOSPADM

## 2024-09-09 RX ORDER — CITRIC ACID/SODIUM CITRATE 334-500MG
30 SOLUTION, ORAL ORAL ONCE
Status: DISCONTINUED | OUTPATIENT
Start: 2024-09-09 | End: 2024-09-09 | Stop reason: HOSPADM

## 2024-09-09 RX ORDER — SODIUM CHLORIDE 0.9 % (FLUSH) 0.9 %
10 SYRINGE (ML) INJECTION EVERY 12 HOURS SCHEDULED
Status: DISCONTINUED | OUTPATIENT
Start: 2024-09-09 | End: 2024-09-09 | Stop reason: HOSPADM

## 2024-09-09 RX ORDER — OXYTOCIN/0.9 % SODIUM CHLORIDE 30/500 ML
125 PLASTIC BAG, INJECTION (ML) INTRAVENOUS ONCE AS NEEDED
Status: COMPLETED | OUTPATIENT
Start: 2024-09-09 | End: 2024-09-09

## 2024-09-09 RX ORDER — ACETAMINOPHEN 325 MG/1
650 TABLET ORAL EVERY 4 HOURS PRN
Status: DISCONTINUED | OUTPATIENT
Start: 2024-09-09 | End: 2024-09-09 | Stop reason: HOSPADM

## 2024-09-09 RX ORDER — FAMOTIDINE 10 MG/ML
20 INJECTION, SOLUTION INTRAVENOUS ONCE AS NEEDED
OUTPATIENT
Start: 2024-09-09

## 2024-09-09 RX ORDER — SODIUM CHLORIDE 9 MG/ML
40 INJECTION, SOLUTION INTRAVENOUS AS NEEDED
Status: DISCONTINUED | OUTPATIENT
Start: 2024-09-09 | End: 2024-09-09 | Stop reason: HOSPADM

## 2024-09-09 RX ORDER — ONDANSETRON 2 MG/ML
4 INJECTION INTRAMUSCULAR; INTRAVENOUS EVERY 6 HOURS PRN
Status: DISCONTINUED | OUTPATIENT
Start: 2024-09-09 | End: 2024-09-09 | Stop reason: HOSPADM

## 2024-09-09 RX ORDER — OXYTOCIN/0.9 % SODIUM CHLORIDE 30/500 ML
250 PLASTIC BAG, INJECTION (ML) INTRAVENOUS CONTINUOUS
Status: ACTIVE | OUTPATIENT
Start: 2024-09-09 | End: 2024-09-09

## 2024-09-09 RX ORDER — SODIUM CHLORIDE 0.9 % (FLUSH) 0.9 %
10 SYRINGE (ML) INJECTION AS NEEDED
Status: DISCONTINUED | OUTPATIENT
Start: 2024-09-09 | End: 2024-09-09 | Stop reason: HOSPADM

## 2024-09-09 RX ADMIN — Medication 125 ML/HR: at 18:52

## 2024-09-09 RX ADMIN — Medication 2 MILLI-UNITS/MIN: at 08:50

## 2024-09-09 RX ADMIN — SODIUM CHLORIDE, POTASSIUM CHLORIDE, SODIUM LACTATE AND CALCIUM CHLORIDE 125 ML/HR: 600; 310; 30; 20 INJECTION, SOLUTION INTRAVENOUS at 15:36

## 2024-09-09 RX ADMIN — SODIUM CHLORIDE, POTASSIUM CHLORIDE, SODIUM LACTATE AND CALCIUM CHLORIDE 125 ML/HR: 600; 310; 30; 20 INJECTION, SOLUTION INTRAVENOUS at 13:30

## 2024-09-09 RX ADMIN — EPHEDRINE SULFATE 10 MG: 50 INJECTION, SOLUTION INTRAVENOUS at 15:40

## 2024-09-09 RX ADMIN — BUTORPHANOL TARTRATE 2 MG: 2 INJECTION, SOLUTION INTRAMUSCULAR; INTRAVENOUS at 13:28

## 2024-09-09 RX ADMIN — BUTORPHANOL TARTRATE 1 MG: 2 INJECTION, SOLUTION INTRAMUSCULAR; INTRAVENOUS at 11:39

## 2024-09-09 RX ADMIN — ROPIVACAINE HYDROCHLORIDE 4 ML/HR: 2 INJECTION, SOLUTION EPIDURAL; INFILTRATION at 15:29

## 2024-09-09 RX ADMIN — IBUPROFEN 600 MG: 600 TABLET, FILM COATED ORAL at 21:32

## 2024-09-09 RX ADMIN — LIDOCAINE HYDROCHLORIDE AND EPINEPHRINE 3 ML: 15; 5 INJECTION, SOLUTION EPIDURAL at 15:23

## 2024-09-09 RX ADMIN — ROPIVACAINE HYDROCHLORIDE 5 ML: 2 INJECTION, SOLUTION EPIDURAL; INFILTRATION at 15:26

## 2024-09-09 RX ADMIN — ONDANSETRON 4 MG: 2 INJECTION INTRAMUSCULAR; INTRAVENOUS at 19:22

## 2024-09-09 RX ADMIN — EPHEDRINE SULFATE 10 MG: 50 INJECTION INTRAVENOUS at 15:53

## 2024-09-09 RX ADMIN — SODIUM CHLORIDE, POTASSIUM CHLORIDE, SODIUM LACTATE AND CALCIUM CHLORIDE 125 ML/HR: 600; 310; 30; 20 INJECTION, SOLUTION INTRAVENOUS at 06:50

## 2024-09-09 RX ADMIN — EPHEDRINE SULFATE 10 MG: 50 INJECTION INTRAVENOUS at 15:40

## 2024-09-09 NOTE — ANESTHESIA PREPROCEDURE EVALUATION
Anesthesia Evaluation     Patient summary reviewed   no history of anesthetic complications:   NPO Solid Status: > 6 hours  NPO Liquid Status: > 2 hours           Airway   Mallampati: II  TM distance: >3 FB  Neck ROM: full  No difficulty expected  Dental - normal exam     Pulmonary - normal exam   (+) asthma,  Cardiovascular - negative cardio ROS and normal exam        Neuro/Psych  (+) psychiatric history Anxiety, Depression and ADHD  GI/Hepatic/Renal/Endo - negative ROS     Musculoskeletal (-) negative ROS    Abdominal  - normal exam   Substance History   (+) drug use (marijuana)     OB/GYN    (+) Pregnant        Other                          Anesthesia Plan    ASA 3     epidural       Anesthetic plan, risks, benefits, and alternatives have been provided, discussed and informed consent has been obtained with: patient.        CODE STATUS:    Code Status (Patient has no pulse and is not breathing): CPR (Attempt to Resuscitate)  Medical Interventions (Patient has pulse or is breathing): Full Support

## 2024-09-09 NOTE — PLAN OF CARE
"Goal Outcome Evaluation:  Plan of Care Reviewed With: patient        Progress: improving  Outcome Evaluation: pt presents to LDR with c/o leaking fluid; upon initial exam nitrazine and fern collection tests were negative; prior to discharge, pt called out asking for \"diaper\" to wear home for leaking, repeat nitrazine and fern performed with positive results obtained; admission labs ordered and collected; pt undecided on pain control for labor, has had epidurals in past; plans to formula feed                               "

## 2024-09-09 NOTE — H&P
[facility]  Obstetric History and Physical    Chief Complaint   Patient presents with    Rupture of Membranes     Patient states she noticed some leaking starting at 0300.        Subjective     Patient is a 19 y.o. female  currently at 39w1d, who presents to OB ED for possible rupture of membranes.  She first noticed leaking fluid around 3:00 this morning.  Fluid was clear and odorless.  No vaginal bleeding.  No contractions.  Her evaluation showed negative nitrazine, negative ferning, and amniotic sac was palpated by RN on exam and was going to be discharged.  When patient stood up to leave she felt another large gush, this time nitrazine, ferning were positive and bag no longer palpable.      The following portions of the patients history were reviewed and updated as appropriate: current medications, allergies, past medical history, past surgical history, past family history, past social history, and problem list .       Prenatal Information:  Prenatal Results       Initial Prenatal Labs       Test Value Reference Range Date Time    Hemoglobin ^ 10.3 g/dL  24        11.9 g/dL 12.0 - 15.9 23 0832    Hematocrit ^ 32 %  24        38.4 % 34.0 - 46.6 23 0832    Platelets ^ 300 K/µL  24        382 10*3/mm3 140 - 450 23 0832    Rubella IgG ^ non-immune   24     Hepatitis B SAg ^ Negative   24     Hepatitis C Ab ^ non-reactive   24     RPR  Non Reactive  Non Reactive 24 1049      ^ Non-Reactive   24     T. Pallidum Ab         ABO ^ A   24     Rh ^ Positive   24     Antibody Screen ^ Normal  Normal 24     HIV ^ non-reactive   24     Urine Culture ^ No growth  No growth at 24 hours, No growth at 2 days, No growth at 3 days, No growth, Growth present, too young to evaluate, Culture in progress 24     Gonorrhea  Negative  Negative 24 1300      ^ negative   24     Chlamydia  Negative  Negative 24 1300      ^  negative   01/18/24     TSH        HgB A1c         Varicella IgG ^ 579.3   01/18/24     Hemoglobinopathy Fractionation        Hemoglobinopathy (genetic testing)        Cystic fibrosis                   Fetal testing        Test Value Reference Range Date Time    NIPT ^ Low risk   02/22/24     MSAFP        AFP-4                  2nd and 3rd Trimester       Test Value Reference Range Date Time    Hemoglobin (repeated)  7.8 g/dL 11.1 - 15.9 08/27/24 1241       8.4 g/dL 11.1 - 15.9 06/28/24 1049    Hematocrit (repeated)        Platelets  ^ 300 K/µL  01/18/24        382 10*3/mm3 140 - 450 12/16/23 0832    1 hour GTT         Antibody Screen (repeated)        3rd TM syphilis scrn (repeated)  RPR   Non Reactive  Non Reactive 06/28/24 1049    3rd TM syphilis scrn (repeated) TP-Ab        3rd TM syphilis screen TB-Ab (FTA)        Syphilis cascade test TP-Ab (EIA)        Syphilis cascade TPPA        GTT Fasting        GTT 1 Hr        GTT 2 Hr        GTT 3 Hr        Group B Strep  Negative  Negative 08/27/24 1300              Other testing        Test Value Reference Range Date Time    Parvo IgG         CMV IgG                   Drug Screening       Test Value Reference Range Date Time    Amphetamine Screen ^ Negative   01/18/24        Negative  Negative 12/16/23 0958    Barbiturate Screen ^ negative   01/18/24        Negative  Negative 12/16/23 0958    Benzodiazepine Screen ^ Negative   01/18/24        Negative  Negative 12/16/23 0958    Methadone Screen ^ Negative   01/18/24        Negative  Negative 12/16/23 0958    Phencyclidine Screen ^ negative   01/18/24        Negative  Negative 12/16/23 0958    Opiates Screen ^ Negative  Negative 01/18/24        Negative  Negative 12/16/23 0958    THC Screen ^ Negative  Negative 01/18/24        Positive  Negative 12/16/23 0958    Cocaine Screen ^ negative   01/18/24        Negative  Negative 12/16/23 0958    Propoxyphene Screen        Buprenorphine Screen  Negative  Negative 12/16/23  0958    Methamphetamine Screen  Negative  Negative 12/16/23 0958    Oxycodone Screen  Negative  Negative 12/16/23 0958    Tricyclic Antidepressants Screen  Negative  Negative 12/16/23 0958              Legend    ^: Historical                          External Prenatal Results       Pregnancy Outside Results - Transcribed From Office Records - See Scanned Records For Details       Test Value Date Time    ABO ^ A  01/18/24     Rh ^ Positive  01/18/24     Antibody Screen ^ Normal  01/18/24     Varicella IgG ^ 579.3  01/18/24     Rubella ^ non-immune  01/18/24     Hgb  7.8 g/dL 08/27/24 1241       8.4 g/dL 06/28/24 1049      ^ 10.3 g/dL 01/18/24        11.9 g/dL 12/16/23 0832    Hct ^ 32 % 01/18/24        38.4 % 12/16/23 0832    HgB A1c        1h GTT       3h GTT Fasting       3h GTT 1 hour       3h GTT 2 hour       3h GTT 3 hour        Gonorrhea (discrete)  Negative  08/27/24 1300      ^ negative  01/18/24     Chlamydia (discrete)  Negative  08/27/24 1300      ^ negative  01/18/24     RPR  Non Reactive  06/28/24 1049      ^ Non-Reactive  01/18/24     Syphils cascade: TP-Ab (FTA)       TP-Ab       TP-Ab (EIA)       TPPA       HBsAg ^ Negative  01/18/24     Herpes Simplex Virus PCR       Herpes Simplex VIrus Culture       HIV ^ non-reactive  01/18/24     Hep C RNA Quant PCR       Hep C Antibody ^ non-reactive  01/18/24     AFP       NIPT ^ Low risk  02/22/24     Cystic Fibroisis        Group B Strep  Negative  08/27/24 1300    GBS Susceptibility to Clindamycin       GBS Susceptibility to Erythromycin       Fetal Fibronectin       Genetic Testing, Maternal Blood                 Drug Screening       Test Value Date Time    Urine Drug Screen       Amphetamine Screen ^ Negative  01/18/24        Negative  12/16/23 0958    Barbiturate Screen ^ negative  01/18/24        Negative  12/16/23 0958    Benzodiazepine Screen ^ Negative  01/18/24        Negative  12/16/23 0958    Methadone Screen ^ Negative  01/18/24        Negative   23 0958    Phencyclidine Screen ^ negative  24        Negative  23 0958    Opiates Screen ^ Negative  24        Negative  23 0958    THC Screen ^ Negative  24        Positive  23 0958    Cocaine Screen ^ negative  24     Propoxyphene Screen       Buprenorphine Screen  Negative  23 0958    Methamphetamine Screen       Oxycodone Screen  Negative  23 0958    Tricyclic Antidepressants Screen  Negative  23 0958              Legend    ^: Historical                             Past OB History:     OB History    Para Term  AB Living   4 3 2 1 0 2   SAB IAB Ectopic Molar Multiple Live Births   0 0 0 0 0 2      # Outcome Date GA Lbr Jose Maria/2nd Weight Sex Type Anes PTL Lv   4 Current            3  23 20w0d    Vag-Spont   FD      Complications: Other Excessive Bleeding, Retained placenta      Name: LILY ARANDA FD      Apgar1: 0  Apgar5: 0   2 Term 22 37w0d 04:31 / 00:04 2430 g (5 lb 5.7 oz) M Vag-Vacuum EPI N DANIEL      Birth Comments: HC 31 cm      AGA 11.73%      Name: ELIZABETH ARANDA      Apgar1: 8  Apgar5: 9   1 Term 21 40w4d / 03:21 3050 g (6 lb 11.6 oz) M Vag-Spont EPI N DANIEL      Birth Comments: HC 34 cm SGA      Name: ELIZABETH ARANDA      Apgar1: 8  Apgar5: 8       Past Medical History: Past Medical History:   Diagnosis Date    ADHD     Allergic     Anxiety     Asthma     Depression       Past Surgical History Past Surgical History:   Procedure Laterality Date    DILATATION AND CURETTAGE N/A 2023    Procedure: DILATATION AND CURETTAGE;  Surgeon: Darryl Del Valle MD;  Location: [facility] OR;  Service: Obstetrics/Gynecology;  Laterality: N/A;    FOOT SURGERY        Family History: Family History   Problem Relation Age of Onset    Arthritis Mother     Depression Mother     Dementia Mother     Hypertension Mother     Heart attack Father     Diabetes Father     Alcohol abuse Father     Hypertension Maternal  Grandmother     Diabetes Maternal Grandfather     Hypertension Maternal Grandfather     Heart disease Maternal Grandfather     Breast cancer Neg Hx     Ovarian cancer Neg Hx     Uterine cancer Neg Hx     Colon cancer Neg Hx       Social History:  reports that she quit smoking about 4 years ago. Her smoking use included cigarettes. She has never used smokeless tobacco.   reports no history of alcohol use.   reports current drug use. Drug: Marijuana.        Review of Systems      Objective     Vital Signs Range for the last 24 hours  Temperature: Temp:  [98.2 °F (36.8 °C)] 98.2 °F (36.8 °C)   Temp Source: Temp src: Oral   BP: BP: (105)/(66) 105/66   Pulse: Heart Rate:  [108] 108   Respirations: Resp:  [18] 18   SPO2:     O2 Amount (l/min):     O2 Devices     Weight: Weight:  [56.4 kg (124 lb 6.4 oz)] 56.4 kg (124 lb 6.4 oz)     Physical Examination: General appearance - alert, well appearing, and in no distress    Cervix: Exam by:     Dilation: Cervical Dilation (cm): 4   Effacement: Cervical Effacement: 60%   Station:       Fetal Heart Rate Assessment   Method: Fetal HR Assessment Method: external   Beats/min: Fetal HR (beats/min): 135   Baseline: Fetal HR Baseline: normal range   Variability: Fetal HR Variability: moderate (amplitude range 6 to 25 bpm)   Accels: Fetal HR Accelerations: greater than/equal to 15 bpm, lasting at least 15 seconds   Decels: Fetal HR Decelerations: absent   Tracing Category:       Uterine Assessment   Method: Method: external tocotransducer, per patient report, palpation   Frequency (min): Contraction Frequency (Minutes): occasional   Ctx Count in 10 min:     Duration:     Intensity: Contraction Intensity: mild by palpation   Intensity by IUPC:     Resting Tone: Uterine Resting Tone: soft by palpation   Resting Tone by IUPC:     [place] Units:       Other Studies: Nitrazine and ferning positive  Assessment & Plan       PROM with onset of labor within 24 hours of rupture    39 weeks  gestation of pregnancy        Assessment:  1.  19-year-old G4, P2 at 39.1 weeks presenting with premature rupture of membranes confirmed with positive nitrazine and ferning.  She is not experiencing any contractions at this time.  I discussed expectant management and augmentation with Pitocin, and the potential for infection with prolonged rupture of membranes.  Expressed understanding and she would prefer delaying augmentation would prefer expectant management for now.  2.  GBS status:   Strep Gp B CHRISTINE   Date Value Ref Range Status   2024 Negative Negative Final     Comment:     Centers for Disease Control and Prevention (CDC) and American Congress  of Obstetricians and Gynecologists (ACOG) guidelines for prevention of   group B streptococcal (GBS) disease specify co-collection of  a vaginal and rectal swab specimen to maximize sensitivity of GBS  detection. Per the CDC and ACOG, swabbing both the lower vagina and  rectum substantially increases the yield of detection compared with  sampling the vagina alone.  Penicillin G, ampicillin, or cefazolin are indicated for intrapartum  prophylaxis of  GBS colonization. Reflex susceptibility  testing should be performed prior to use of clindamycin only on GBS  isolates from penicillin-allergic women who are considered a high risk  for anaphylaxis. Treatment with vancomycin without additional testing  is warranted if resistance to clindamycin is noted.         Plan:  1.  Admit to labor and delivery with routine labor orders.  Dr. Vivas notified.  2. Plan of care has been reviewed with patient and significant other  3.  Risks, benefits of treatment plan have been discussed.  4.  All questions have been answered.        Tomi Garcia,   2024  06:26 CDT

## 2024-09-09 NOTE — ANESTHESIA PROCEDURE NOTES
Epidural Block    Pre-sedation assessment completed: 9/9/2024 3:54 PM    Patient reassessed immediately prior to procedure    Patient location during procedure: OB  Start Time: 9/9/2024 3:15 PM  Stop Time: 9/9/2024 3:31 PM  Indication:at surgeon's request and procedure for pain  Performed By  CRNA/CAA: Marcella Nance CRNA  Preanesthetic Checklist  Completed: patient identified, IV checked, site marked, risks and benefits discussed, surgical consent, monitors and equipment checked, pre-op evaluation and timeout performed  Prep:  Pt Position:sitting  Sterile Tech:cap, gloves, mask and sterile barrier  Prep:chlorhexidine gluconate and isopropyl alcohol  Monitoring:blood pressure monitoring, continuous pulse oximetry and EKG  Epidural Block Procedure:  Approach:midline  Guidance:landmark technique and palpation technique  Location:lumbar  Level:4-5  Needle Type:Tuohy  Needle Gauge:17 G  Cath Size: 20 G  Loss of Resistance Medium: saline  Loss of Resistance: 7cm  Cath Depth at skin:14 cm  Paresthesia: none  Aspiration:negative  Test Dose:negative  Post Assessment:  Dressing:occlusive dressing applied and secured with tape  Pt Tolerance:patient tolerated the procedure well with no apparent complications and no signs or symtoms of SAB or intravascular injection  Complications:no
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
## 2024-09-09 NOTE — PROGRESS NOTES
Darryl Del Valle MD  [facility]  [address]  [place] [address]  Office [phone]  Fax [phone]      [facility]  Rosmery SHABBIR Padilla  : 2005  MRN: [id number]  CSN: [id number]    Labor progress note    Subjective   She reports is feeling painful contraction and is feeling pelvic pressure     Objective   Min/max vitals past 24 hours:  Temp  Min: 98 °F (36.7 °C)  Max: 98.6 °F (37 °C)   BP  Min: 103/63  Max: 129/76   Pulse  Min: 66  Max: 108   Resp  Min: 18  Max: 18        FHT's: reassuring, low baseline and category 2.  external monitors used   Cervix: was checked (by me): 7 cm / 90 % / 0  Position feels SCARLET   Contractions: regular every 2 minutes      Assessment   IUP at 39w1d  Active labor     Plan   1.    Patient to get epidural, hopefully pelvic relaxation will help with labor progress.  Allow labor to continue pending maternal and fetal status  Plan discussed with family and questions answered.  Understanding verbalized.    Darryl Del Valle MD  2024  15:19 CDT

## 2024-09-09 NOTE — NURSING NOTE
Pt sitting up for epidural from [id number]. RN and CRNa at bedside. Difficulty monitoring d/t to pt position. RN at bedside from 1415-present.

## 2024-09-09 NOTE — L&D DELIVERY NOTE
[facility]  Vaginal Delivery Note  Review the Delivery Report for details.       Delivery     Delivery: Vaginal, Spontaneous     YOB: 2026    Time of Birth:  Gestational Age 5:55 PM   39w1d     Anesthesia: Epidural     Delivering clinician: Sonia Latham    Forceps?   No   Vacuum? No    Shoulder dystocia present: No        Delivery narrative:  The patient was noted to be completely dilated and effaced with the fetal head at the introitus. The fetal vertex was delivered OA spontaneously with maternal pushing efforts. no. The left anterior shoulder was delivered atraumatically by maternal expulsive efforts with the assistance of routine downward traction. The posterior shoulder delivered with maternal expulsive efforts and routine upward traction. The remainder of the fetus delivered spontaneously. Upon delivery, the infant was noted to be vigorous and was passed to the mother's abdomen into the care of the awaiting infant care nurse. Following a 30-second delay in cord clamping, the cord was clamped x2 and cut.  During the third stage of labor, IV Pitocin was administered to enhance uterine contraction. The placenta delivered spontaneously, intact, with a three-vessel cord.The cervix, vagina and perineum were inspected for lacerations.No lacerations. Good hemostasis was confirmed. No lacerations were noted to the cervix, perineum, or vulva. The uterine fundus was firm at the end of the procedure. Mom and baby tolerated the delivery well. All needle, sponge, and instrument counts were noted to be correct x2 at the end of the procedure.       Infant    Findings: male  infant     Infant observations: Weight: No birth weight on file.   Length:   in  Observations/Comments:        Apgars:   @ 1 minute /      @ 5 minutes   Infant Name: [place]     Placenta, Cord, and Fluid    Placenta delivered  Spontaneous  at   9/9/2024  5:58 PM     Cord: 3 vessels  present.   Nuchal Cord?  no   Cord blood obtained: No   "  Cord gases obtained:  No    Cord gas results: Venous:  No results found for: \"PHCVEN\", \"BECVEN\"    Arterial:  No results found for: \"PHCART\", \"BECART\"     Repair    Episiotomy: None     No    Lacerations: No   Estimated Blood Loss:               Quantitative Blood Loss:    QBL from VAG DEL: 58 (09/09/24 1759)             Complications  none    Disposition  Mother to Mother Baby/Postpartum  in stable condition currently.  Baby to remains with mom  in stable condition currently.      Sonia Latham MD  09/09/24  18:00 CDT       "

## 2024-09-10 LAB
ANISOCYTOSIS BLD QL: ABNORMAL
BASOPHILS # BLD MANUAL: 0 10*3/MM3 (ref 0–0.2)
BASOPHILS NFR BLD MANUAL: 0 % (ref 0–1.5)
DEPRECATED RDW RBC AUTO: 40.8 FL (ref 37–54)
EOSINOPHIL # BLD MANUAL: 0 10*3/MM3 (ref 0–0.4)
EOSINOPHIL NFR BLD MANUAL: 0 % (ref 0.3–6.2)
ERYTHROCYTE [DISTWIDTH] IN BLOOD BY AUTOMATED COUNT: 16.2 % (ref 12.3–15.4)
HCT VFR BLD AUTO: 25.2 % (ref 34–46.6)
HGB BLD-MCNC: 7.6 G/DL (ref 12–15.9)
LYMPHOCYTES # BLD MANUAL: 4.45 10*3/MM3 (ref 0.7–3.1)
LYMPHOCYTES NFR BLD MANUAL: 0 % (ref 5–12)
MCH RBC QN AUTO: 20.9 PG (ref 26.6–33)
MCHC RBC AUTO-ENTMCNC: 30.2 G/DL (ref 31.5–35.7)
MCV RBC AUTO: 69.2 FL (ref 79–97)
MICROCYTES BLD QL: ABNORMAL
MONOCYTES # BLD: 0 10*3/MM3 (ref 0.1–0.9)
NEUTROPHILS # BLD AUTO: 18.71 10*3/MM3 (ref 1.7–7)
NEUTROPHILS NFR BLD MANUAL: 80.8 % (ref 42.7–76)
PLAT MORPH BLD: NORMAL
PLATELET # BLD AUTO: 269 10*3/MM3 (ref 140–450)
PMV BLD AUTO: 10.9 FL (ref 6–12)
RBC # BLD AUTO: 3.64 10*6/MM3 (ref 3.77–5.28)
VARIANT LYMPHS NFR BLD MANUAL: 19.2 % (ref 19.6–45.3)
WBC MORPH BLD: NORMAL
WBC NRBC COR # BLD AUTO: 23.16 10*3/MM3 (ref 3.4–10.8)

## 2024-09-10 PROCEDURE — 0503F POSTPARTUM CARE VISIT: CPT | Performed by: ADVANCED PRACTICE MIDWIFE

## 2024-09-10 PROCEDURE — 85025 COMPLETE CBC W/AUTO DIFF WBC: CPT | Performed by: ADVANCED PRACTICE MIDWIFE

## 2024-09-10 PROCEDURE — 85007 BL SMEAR W/DIFF WBC COUNT: CPT | Performed by: ADVANCED PRACTICE MIDWIFE

## 2024-09-10 RX ORDER — PRENATAL VIT/IRON FUM/FOLIC AC 27MG-0.8MG
1 TABLET ORAL DAILY
Status: DISCONTINUED | OUTPATIENT
Start: 2024-09-10 | End: 2024-09-11 | Stop reason: HOSPADM

## 2024-09-10 RX ORDER — CALCIUM CARBONATE 500 MG/1
2 TABLET, CHEWABLE ORAL 3 TIMES DAILY PRN
Status: DISCONTINUED | OUTPATIENT
Start: 2024-09-10 | End: 2024-09-11 | Stop reason: HOSPADM

## 2024-09-10 RX ORDER — MORPHINE SULFATE 2 MG/ML
1 INJECTION, SOLUTION INTRAMUSCULAR; INTRAVENOUS EVERY 4 HOURS PRN
Status: DISCONTINUED | OUTPATIENT
Start: 2024-09-10 | End: 2024-09-11

## 2024-09-10 RX ORDER — PROMETHAZINE HYDROCHLORIDE 12.5 MG/1
12.5 SUPPOSITORY RECTAL EVERY 6 HOURS PRN
Status: DISCONTINUED | OUTPATIENT
Start: 2024-09-10 | End: 2024-09-11 | Stop reason: HOSPADM

## 2024-09-10 RX ORDER — ONDANSETRON 2 MG/ML
4 INJECTION INTRAMUSCULAR; INTRAVENOUS EVERY 6 HOURS PRN
Status: DISCONTINUED | OUTPATIENT
Start: 2024-09-10 | End: 2024-09-11 | Stop reason: HOSPADM

## 2024-09-10 RX ORDER — SODIUM CHLORIDE 0.9 % (FLUSH) 0.9 %
1-10 SYRINGE (ML) INJECTION AS NEEDED
Status: DISCONTINUED | OUTPATIENT
Start: 2024-09-10 | End: 2024-09-11 | Stop reason: HOSPADM

## 2024-09-10 RX ORDER — DOCUSATE SODIUM 100 MG/1
100 CAPSULE, LIQUID FILLED ORAL 2 TIMES DAILY
Status: DISCONTINUED | OUTPATIENT
Start: 2024-09-10 | End: 2024-09-11 | Stop reason: HOSPADM

## 2024-09-10 RX ORDER — MISOPROSTOL 200 UG/1
600 TABLET ORAL ONCE AS NEEDED
Status: DISCONTINUED | OUTPATIENT
Start: 2024-09-10 | End: 2024-09-11 | Stop reason: HOSPADM

## 2024-09-10 RX ORDER — NALOXONE HCL 0.4 MG/ML
0.4 VIAL (ML) INJECTION
Status: DISCONTINUED | OUTPATIENT
Start: 2024-09-10 | End: 2024-09-11 | Stop reason: HOSPADM

## 2024-09-10 RX ORDER — IBUPROFEN 600 MG/1
600 TABLET, FILM COATED ORAL EVERY 6 HOURS PRN
Status: DISCONTINUED | OUTPATIENT
Start: 2024-09-10 | End: 2024-09-11 | Stop reason: HOSPADM

## 2024-09-10 RX ORDER — HYDROXYZINE HYDROCHLORIDE 25 MG/1
50 TABLET, FILM COATED ORAL NIGHTLY PRN
Status: DISCONTINUED | OUTPATIENT
Start: 2024-09-10 | End: 2024-09-11 | Stop reason: HOSPADM

## 2024-09-10 RX ORDER — METHYLERGONOVINE MALEATE 0.2 MG/ML
200 INJECTION INTRAVENOUS ONCE AS NEEDED
Status: DISCONTINUED | OUTPATIENT
Start: 2024-09-10 | End: 2024-09-11 | Stop reason: HOSPADM

## 2024-09-10 RX ORDER — ACETAMINOPHEN 325 MG/1
650 TABLET ORAL EVERY 6 HOURS PRN
Status: DISCONTINUED | OUTPATIENT
Start: 2024-09-10 | End: 2024-09-11 | Stop reason: HOSPADM

## 2024-09-10 RX ORDER — TRAMADOL HYDROCHLORIDE 50 MG/1
50 TABLET ORAL EVERY 6 HOURS PRN
Status: DISCONTINUED | OUTPATIENT
Start: 2024-09-10 | End: 2024-09-11 | Stop reason: HOSPADM

## 2024-09-10 RX ORDER — ONDANSETRON 4 MG/1
4 TABLET, ORALLY DISINTEGRATING ORAL EVERY 8 HOURS PRN
Status: DISCONTINUED | OUTPATIENT
Start: 2024-09-10 | End: 2024-09-11 | Stop reason: HOSPADM

## 2024-09-10 RX ORDER — HYDROCORTISONE 25 MG/G
1 CREAM TOPICAL AS NEEDED
Status: DISCONTINUED | OUTPATIENT
Start: 2024-09-10 | End: 2024-09-11 | Stop reason: HOSPADM

## 2024-09-10 RX ORDER — PROMETHAZINE HYDROCHLORIDE 25 MG/1
25 TABLET ORAL EVERY 6 HOURS PRN
Status: DISCONTINUED | OUTPATIENT
Start: 2024-09-10 | End: 2024-09-11 | Stop reason: HOSPADM

## 2024-09-10 RX ORDER — BISACODYL 10 MG
10 SUPPOSITORY, RECTAL RECTAL DAILY PRN
Status: DISCONTINUED | OUTPATIENT
Start: 2024-09-11 | End: 2024-09-11 | Stop reason: HOSPADM

## 2024-09-10 RX ADMIN — PRENATAL VIT W/ FE FUMARATE-FA TAB 27-0.8 MG 1 TABLET: 27-0.8 TAB at 08:49

## 2024-09-10 RX ADMIN — IBUPROFEN 600 MG: 600 TABLET, FILM COATED ORAL at 21:47

## 2024-09-10 RX ADMIN — DOCUSATE SODIUM 100 MG: 100 CAPSULE, LIQUID FILLED ORAL at 21:22

## 2024-09-10 RX ADMIN — IBUPROFEN 600 MG: 600 TABLET, FILM COATED ORAL at 11:47

## 2024-09-10 RX ADMIN — ACETAMINOPHEN 650 MG: 325 TABLET ORAL at 17:47

## 2024-09-10 RX ADMIN — Medication: at 09:06

## 2024-09-10 RX ADMIN — DOCUSATE SODIUM 100 MG: 100 CAPSULE, LIQUID FILLED ORAL at 08:48

## 2024-09-10 NOTE — PLAN OF CARE
Goal Outcome Evaluation:  Plan of Care Reviewed With: patient        Progress: improving  Outcome Evaluation: VSS, FFML U1, scant rubra, passed one golf ball sized clot this shift, was firm and scant immediately after, pt has consistently rated pain a 5 which she states is tolerable, resting comortably between care

## 2024-09-10 NOTE — ANESTHESIA POSTPROCEDURE EVALUATION
"Patient: Rosmery Padilla    Procedure Summary       Date: 09/09/24 Room / Location:     Anesthesia Start: 1510 Anesthesia Stop: 1755    Procedure: LABOR ANALGESIA Diagnosis:     Scheduled Providers:  Provider: Marcella Nance CRNA    Anesthesia Type: epidural ASA Status: 3            Anesthesia Type: epidural    Vitals  Vitals Value Taken Time   /66 09/10/24 1534   Temp 96.9 °F (36.1 °C) 09/10/24 1534   Pulse 67 09/10/24 1534   Resp 18 09/10/24 1534   SpO2 99 % 09/10/24 1534           Post Anesthesia Care and Evaluation    Patient location during evaluation: bedside  Patient participation: complete - patient participated  Level of consciousness: awake  Pain management: adequate    Airway patency: patent  Anesthetic complications: No anesthetic complications  PONV Status: none  Cardiovascular status: acceptable  Respiratory status: acceptable  Hydration status: acceptable  Post Neuraxial Block status: Motor and sensory function returned to baseline and No signs or symptoms of PDPH  Comments: Blood pressure 109/66, pulse 67, temperature 96.9 °F (36.1 °C), temperature source Axillary, resp. rate 18, height 147.3 cm (58\"), weight 56.4 kg (124 lb 6.4 oz), last menstrual period 12/10/2023, SpO2 99%, not currently breastfeeding.      "

## 2024-09-10 NOTE — PAYOR COMM NOTE
"ADMIT INPT 24  UR  [phone]    Richard Aranda (19 y.o. Female)       Date of Birth   2005    Social Security Number       Address   [address] [address]    Home Phone   [phone]    MRN   [id number]       [facility]    Marital Status   Single                            Admission Date   24    Admission Type   Elective    Admitting Provider   Darryl Del Valle MD    Attending Provider   Darryl Del Valle MD    Department, Room/Bed   [facility] MOTHER BABY 2A, M240/1       Discharge Date       Discharge Disposition       Discharge Destination                                 Attending Provider: Darryl Del Valle MD    Allergies: Blue Dyes (Parenteral), Other    Isolation: None   Infection: ESBL E coli (23)   Code Status: CPR    Ht: 147.3 cm (58\")   Wt: 56.4 kg (124 lb 6.4 oz)    Admission Cmt: None   Principal Problem: Full-term PROM with onset of labor within 24 hours of rupture [O42.02]                   Active Insurance as of 2024       Primary Coverage       Payor Plan Insurance Group Employer/Plan Group    [facility] MEDICAID        Payor Plan Address Payor Plan Phone Number Payor Plan Fax Number Effective Dates    [address] [address] [phone]  2018 - None Entered    [facility] [id number]         Subscriber Name Subscriber Birth Date Member ID       RICHARD ARANDA 2005 [id number]                     Emergency Contacts        (Rel.) Home Phone Work Phone Mobile Phone    JENN ZIMMERMAN (Significant Other) [phone]                 History & Physical        Tomi Garcia DO at 24 0556          [facility]  Obstetric History and Physical    Chief Complaint   Patient presents with    Rupture of Membranes     Patient states she noticed some leaking starting at 0300.        Subjective    Patient is a 19 y.o. female  currently at 39w1d, who presents to OB " ED for possible rupture of membranes.  She first noticed leaking fluid around 3:00 this morning.  Fluid was clear and odorless.  No vaginal bleeding.  No contractions.  Her evaluation showed negative nitrazine, negative ferning, and amniotic sac was palpated by RN on exam and was going to be discharged.  When patient stood up to leave she felt another large gush, this time nitrazine, ferning were positive and bag no longer palpable.      The following portions of the patients history were reviewed and updated as appropriate: current medications, allergies, past medical history, past surgical history, past family history, past social history, and problem list .       Prenatal Information:  Prenatal Results       Initial Prenatal Labs       Test Value Reference Range Date Time    Hemoglobin ^ 10.3 g/dL  01/18/24        11.9 g/dL 12.0 - 15.9 12/16/23 0832    Hematocrit ^ 32 %  01/18/24        38.4 % 34.0 - 46.6 12/16/23 0832    Platelets ^ 300 K/µL  01/18/24        382 10*3/mm3 140 - 450 12/16/23 0832    Rubella IgG ^ non-immune   01/18/24     Hepatitis B SAg ^ Negative   01/18/24     Hepatitis C Ab ^ non-reactive   01/18/24     RPR  Non Reactive  Non Reactive 06/28/24 1049      ^ Non-Reactive   01/18/24     T. Pallidum Ab         ABO ^ A   01/18/24     Rh ^ Positive   01/18/24     Antibody Screen ^ Normal  Normal 01/18/24     HIV ^ non-reactive   01/18/24     Urine Culture ^ No growth  No growth at 24 hours, No growth at 2 days, No growth at 3 days, No growth, Growth present, too young to evaluate, Culture in progress 01/18/24     Gonorrhea  Negative  Negative 08/27/24 1300      ^ negative   01/18/24     Chlamydia  Negative  Negative 08/27/24 1300      ^ negative   01/18/24     TSH        HgB A1c         Varicella IgG ^ 579.3   01/18/24     Hemoglobinopathy Fractionation        Hemoglobinopathy (genetic testing)        Cystic fibrosis                   Fetal testing        Test Value Reference Range Date Time    NIPT  ^ Low risk   02/22/24     MSAFP        AFP-4                  2nd and 3rd Trimester       Test Value Reference Range Date Time    Hemoglobin (repeated)  7.8 g/dL 11.1 - 15.9 08/27/24 1241       8.4 g/dL 11.1 - 15.9 06/28/24 1049    Hematocrit (repeated)        Platelets  ^ 300 K/µL  01/18/24        382 10*3/mm3 140 - 450 12/16/23 0832    1 hour GTT         Antibody Screen (repeated)        3rd TM syphilis scrn (repeated)  RPR   Non Reactive  Non Reactive 06/28/24 1049    3rd TM syphilis scrn (repeated) TP-Ab        3rd TM syphilis screen TB-Ab (FTA)        Syphilis cascade test TP-Ab (EIA)        Syphilis cascade TPPA        GTT Fasting        GTT 1 Hr        GTT 2 Hr        GTT 3 Hr        Group B Strep  Negative  Negative 08/27/24 1300              Other testing        Test Value Reference Range Date Time    Parvo IgG         CMV IgG                   Drug Screening       Test Value Reference Range Date Time    Amphetamine Screen ^ Negative   01/18/24        Negative  Negative 12/16/23 0958    Barbiturate Screen ^ negative   01/18/24        Negative  Negative 12/16/23 0958    Benzodiazepine Screen ^ Negative   01/18/24        Negative  Negative 12/16/23 0958    Methadone Screen ^ Negative   01/18/24        Negative  Negative 12/16/23 0958    Phencyclidine Screen ^ negative   01/18/24        Negative  Negative 12/16/23 0958    Opiates Screen ^ Negative  Negative 01/18/24        Negative  Negative 12/16/23 0958    THC Screen ^ Negative  Negative 01/18/24        Positive  Negative 12/16/23 0958    Cocaine Screen ^ negative   01/18/24        Negative  Negative 12/16/23 0958    Propoxyphene Screen        Buprenorphine Screen  Negative  Negative 12/16/23 0958    Methamphetamine Screen  Negative  Negative 12/16/23 0958    Oxycodone Screen  Negative  Negative 12/16/23 0958    Tricyclic Antidepressants Screen  Negative  Negative 12/16/23 0958              Legend    ^: Historical                          External Prenatal  Results       Pregnancy Outside Results - Transcribed From Office Records - See Scanned Records For Details       Test Value Date Time    ABO ^ A  01/18/24     Rh ^ Positive  01/18/24     Antibody Screen ^ Normal  01/18/24     Varicella IgG ^ 579.3  01/18/24     Rubella ^ non-immune  01/18/24     Hgb  7.8 g/dL 08/27/24 1241       8.4 g/dL 06/28/24 1049      ^ 10.3 g/dL 01/18/24        11.9 g/dL 12/16/23 0832    Hct ^ 32 % 01/18/24        38.4 % 12/16/23 0832    HgB A1c        1h GTT       3h GTT Fasting       3h GTT 1 hour       3h GTT 2 hour       3h GTT 3 hour        Gonorrhea (discrete)  Negative  08/27/24 1300      ^ negative  01/18/24     Chlamydia (discrete)  Negative  08/27/24 1300      ^ negative  01/18/24     RPR  Non Reactive  06/28/24 1049      ^ Non-Reactive  01/18/24     Syphils cascade: TP-Ab (FTA)       TP-Ab       TP-Ab (EIA)       TPPA       HBsAg ^ Negative  01/18/24     Herpes Simplex Virus PCR       Herpes Simplex VIrus Culture       HIV ^ non-reactive  01/18/24     Hep C RNA Quant PCR       Hep C Antibody ^ non-reactive  01/18/24     AFP       NIPT ^ Low risk  02/22/24     Cystic Fibroisis        Group B Strep  Negative  08/27/24 1300    GBS Susceptibility to Clindamycin       GBS Susceptibility to Erythromycin       Fetal Fibronectin       Genetic Testing, Maternal Blood                 Drug Screening       Test Value Date Time    Urine Drug Screen       Amphetamine Screen ^ Negative  01/18/24        Negative  12/16/23 0958    Barbiturate Screen ^ negative  01/18/24        Negative  12/16/23 0958    Benzodiazepine Screen ^ Negative  01/18/24        Negative  12/16/23 0958    Methadone Screen ^ Negative  01/18/24        Negative  12/16/23 0958    Phencyclidine Screen ^ negative  01/18/24        Negative  12/16/23 0958    Opiates Screen ^ Negative  01/18/24        Negative  12/16/23 0958    THC Screen ^ Negative  01/18/24        Positive  12/16/23 0958    Cocaine Screen ^ negative  01/18/24      Propoxyphene Screen       Buprenorphine Screen  Negative  23 09    Methamphetamine Screen       Oxycodone Screen  Negative  23 09    Tricyclic Antidepressants Screen  Negative  23              Legend    ^: Historical                             Past OB History:     OB History    Para Term  AB Living   4 3 2 1 0 2   SAB IAB Ectopic Molar Multiple Live Births   0 0 0 0 0 2      # Outcome Date GA Lbr Jose Maria/2nd Weight Sex Type Anes PTL Lv   4 Current            3  23 20w0d    Vag-Spont   FD      Complications: Other Excessive Bleeding, Retained placenta      Name: MANOJPENDING FD      Apgar1: 0  Apgar5: 0   2 Term 22 37w0d 04:31 / 00:04 2430 g (5 lb 5.7 oz) M Vag-Vacuum EPI N DANIEL      Birth Comments: HC 31 cm      AGA 11.73%      Name: ELIZABETH ARANDA      Apgar1: 8  Apgar5: 9   1 Term 19 40w4d / 03:21 3050 g (6 lb 11.6 oz) M Vag-Spont EPI N DANIEL      Birth Comments: HC 34 cm SGA      Name: ELIZABETH ARANDA      Apgar1: 8  Apgar5: 8       Past Medical History: Past Medical History:   Diagnosis Date    ADHD     Allergic     Anxiety     Asthma     Depression       Past Surgical History Past Surgical History:   Procedure Laterality Date    DILATATION AND CURETTAGE N/A 2023    Procedure: DILATATION AND CURETTAGE;  Surgeon: Darryl Del Valle MD;  Location: [facility];  Service: Obstetrics/Gynecology;  Laterality: N/A;    FOOT SURGERY        Family History: Family History   Problem Relation Age of Onset    Arthritis Mother     Depression Mother     Dementia Mother     Hypertension Mother     Heart attack Father     Diabetes Father     Alcohol abuse Father     Hypertension Maternal Grandmother     Diabetes Maternal Grandfather     Hypertension Maternal Grandfather     Heart disease Maternal Grandfather     Breast cancer Neg Hx     Ovarian cancer Neg Hx     Uterine cancer Neg Hx     Colon cancer Neg Hx       Social History:  reports that she quit smoking  about 4 years ago. Her smoking use included cigarettes. She has never used smokeless tobacco.   reports no history of alcohol use.   reports current drug use. Drug: Marijuana.        Review of Systems      Objective    Vital Signs Range for the last 24 hours  Temperature: Temp:  [98.2 °F (36.8 °C)] 98.2 °F (36.8 °C)   Temp Source: Temp src: Oral   BP: BP: (105)/(66) 105/66   Pulse: Heart Rate:  [108] 108   Respirations: Resp:  [18] 18   SPO2:     O2 Amount (l/min):     O2 Devices     Weight: Weight:  [56.4 kg (124 lb 6.4 oz)] 56.4 kg (124 lb 6.4 oz)     Physical Examination: General appearance - alert, well appearing, and in no distress    Cervix: Exam by:     Dilation: Cervical Dilation (cm): 4   Effacement: Cervical Effacement: 60%   Station:       Fetal Heart Rate Assessment   Method: Fetal HR Assessment Method: external   Beats/min: Fetal HR (beats/min): 135   Baseline: Fetal HR Baseline: normal range   Variability: Fetal HR Variability: moderate (amplitude range 6 to 25 bpm)   Accels: Fetal HR Accelerations: greater than/equal to 15 bpm, lasting at least 15 seconds   Decels: Fetal HR Decelerations: absent   Tracing Category:       Uterine Assessment   Method: Method: external tocotransducer, per patient report, palpation   Frequency (min): Contraction Frequency (Minutes): occasional   Ctx Count in 10 min:     Duration:     Intensity: Contraction Intensity: mild by palpation   Intensity by IUPC:     Resting Tone: Uterine Resting Tone: soft by palpation   Resting Tone by IUPC:     [place] Units:       Other Studies: Nitrazine and ferning positive  Assessment & Plan      PROM with onset of labor within 24 hours of rupture    39 weeks gestation of pregnancy        Assessment:  1.  19-year-old G4, P2 at 39.1 weeks presenting with premature rupture of membranes confirmed with positive nitrazine and ferning.  She is not experiencing any contractions at this time.  I discussed expectant management and augmentation  with Pitocin, and the potential for infection with prolonged rupture of membranes.  Expressed understanding and she would prefer delaying augmentation would prefer expectant management for now.  2.  GBS status:   Strep Gp B CHRISTINE   Date Value Ref Range Status   2024 Negative Negative Final     Comment:     Centers for Disease Control and Prevention (CDC) and American Congress  of Obstetricians and Gynecologists (ACOG) guidelines for prevention of   group B streptococcal (GBS) disease specify co-collection of  a vaginal and rectal swab specimen to maximize sensitivity of GBS  detection. Per the CDC and ACOG, swabbing both the lower vagina and  rectum substantially increases the yield of detection compared with  sampling the vagina alone.  Penicillin G, ampicillin, or cefazolin are indicated for intrapartum  prophylaxis of  GBS colonization. Reflex susceptibility  testing should be performed prior to use of clindamycin only on GBS  isolates from penicillin-allergic women who are considered a high risk  for anaphylaxis. Treatment with vancomycin without additional testing  is warranted if resistance to clindamycin is noted.         Plan:  1.  Admit to labor and delivery with routine labor orders.  Dr. Vivas notified.  2. Plan of care has been reviewed with patient and significant other  3.  Risks, benefits of treatment plan have been discussed.  4.  All questions have been answered.        Tomi Garcia DO  2024  06:26 CDT      Electronically signed by Tomi Garcia DO at 24 0629          Operative/Procedure Notes (all)        Sonia Latham MD at 24 1800  Version 1 of [address] [place] [address]  Vaginal Delivery Note  Review the Delivery Report for details.       Delivery     Delivery: Vaginal, Spontaneous     YOB: 2026    Time of Birth:  Gestational Age 5:55 PM   39w1d     Anesthesia: Epidural     Delivering clinician: Sonia Latham    Forceps?    "No   Vacuum? No    Shoulder dystocia present: No        Delivery narrative:  The patient was noted to be completely dilated and effaced with the fetal head at the introitus. The fetal vertex was delivered OA spontaneously with maternal pushing efforts. no. The left anterior shoulder was delivered atraumatically by maternal expulsive efforts with the assistance of routine downward traction. The posterior shoulder delivered with maternal expulsive efforts and routine upward traction. The remainder of the fetus delivered spontaneously. Upon delivery, the infant was noted to be vigorous and was passed to the mother's abdomen into the care of the awaiting infant care nurse. Following a 30-second delay in cord clamping, the cord was clamped x2 and cut.  During the third stage of labor, IV Pitocin was administered to enhance uterine contraction. The placenta delivered spontaneously, intact, with a three-vessel cord.The cervix, vagina and perineum were inspected for lacerations.No lacerations. Good hemostasis was confirmed. No lacerations were noted to the cervix, perineum, or vulva. The uterine fundus was firm at the end of the procedure. Mom and baby tolerated the delivery well. All needle, sponge, and instrument counts were noted to be correct x2 at the end of the procedure.       Infant    Findings: male  infant     Infant observations: Weight: No birth weight on file.   Length:   in  Observations/Comments:        Apgars:   @ 1 minute /      @ 5 minutes   Infant Name: Cooper     Placenta, Cord, and Fluid    Placenta delivered  Spontaneous  at   9/9/2024  5:58 PM     Cord: 3 vessels  present.   Nuchal Cord?  no   Cord blood obtained: No    Cord gases obtained:  No    Cord gas results: Venous:  No results found for: \"PHCVEN\", \"BECVEN\"    Arterial:  No results found for: \"PHCART\", \"BECART\"     Repair    Episiotomy: None     No    Lacerations: No   Estimated Blood Loss:               Quantitative Blood Loss:    QBL from VAG " DEL: 58 (24 0809)             Complications  none    Disposition  Mother to Mother Baby/Postpartum  in stable condition currently.  Baby to remains with mom  in stable condition currently.      Sonia Latham MD  24  18:00 CDT         Electronically signed by Sonia Latham MD at 24 1801          Physician Progress Notes (last 48 hours)        Darryl Del Valle MD at 24 1519              Darryl Del Valle MD  [facility]  [address] [place] [address]  Office [phone]  Fax [phone]      [facility]  Rosmery SHABBIR Padilla  : 2005  MRN: [id number]  CSN: [id number]    Labor progress note    Subjective   She reports is feeling painful contraction and is feeling pelvic pressure    Objective   Min/max vitals past 24 hours:  Temp  Min: 98 °F (36.7 °C)  Max: 98.6 °F (37 °C)   BP  Min: 103/63  Max: 129/76   Pulse  Min: 66  Max: 108   Resp  Min: 18  Max: 18        FHT's: reassuring, low baseline and category 2.  external monitors used   Cervix: was checked (by me): 7 cm / 90 % / 0  Position feels SCARLET   Contractions: regular every 2 minutes     Assessment   IUP at 39w1d  Active labor    Plan   1.    Patient to get epidural, hopefully pelvic relaxation will help with labor progress.  Allow labor to continue pending maternal and fetal status  Plan discussed with family and questions answered.  Understanding verbalized.    Darryl Del Valle MD  2024  15:19 CDT             Electronically signed by Darryl Del Valle MD at 24 0225

## 2024-09-10 NOTE — PROGRESS NOTES
"      ALEJANDRA Sanders  [facility]  [address]  [place] [address]  Office [phone]  Fax [phone]    [facility]  Vaginal Delivery Progress Note    Subjective   Postpartum Day 1: Vaginal Delivery    The patient feels well. She is ambulating without difficulty. Denies concerns with voiding. Describes bleeding as lighter lochia. She is using the comfort products for perineal and rectal discomfort. She declines breastfeeding.     Objective     Vital Signs Range for the last 24 hours  Temperature: Temp:  [97.7 °F (36.5 °C)-98.8 °F (37.1 °C)] 97.8 °F (36.6 °C)   Temp Source: Temp src: Oral   BP: BP: ()/(42-98) 102/62   Pulse: Heart Rate:  [] 69   Respirations: Resp:  [16-18] 18   SPO2: SpO2:  [94 %-100 %] 99 %   O2 Amount (l/min):     O2 Devices Device (Oxygen Therapy): room air   Weight:       Admit Height:  Height: 147.3 cm (58\")      Physical Exam:  General:  no acute distresss.  Abdomen: soft, without significant tenderness; fundus firm  Extremities: calves nontender; trace edema    Lab results reviewed:  Yes   Rubella:  No results found for: \"RUBELLAIGGIN\" Nurse Transcribed from prenatal record --  No components found for: \"EXTRUBELQUAL\"  Rh Status:    RH type   Date Value Ref Range Status   09/09/2024 Positive  Final     Immunizations:   Immunization History   Administered Date(s) Administered    DTaP 2005, 2005, 2005, 06/15/2004, 06/11/2007    Flu Vaccine Quad PF >36MO 10/18/2019    Fluzone (or Fluarix & Flulaval for VFC) >6mos 10/18/2019, 09/09/2020    HPV Quadrivalent 08/06/2019, 10/18/2019    Hep A, 2 Dose 02/19/2018, 08/22/2018    Hepatitis A 02/19/2018, 08/22/2018    Hepatitis B 2005, [birth year], 06/11/2007    Hepatitis B Adult/Adolescent IM 2005, 2005, 2003, 06/11/2007    HiB 2005, 2003, 06/15/2004    IPV 2005, 2003, 2003, 06/11/2007    MMR 06/10/2004, 06/11/2007    MMRV 06/11/2007    " Meningococcal B,(Bexsero) 10/18/2019    Meningococcal Conjugate 05/19/2015, 08/06/2019    Tdap 05/19/2015, 11/04/2020    Varicella 06/10/2004, 06/11/2007, 08/27/2022     Lab Results (last 24 hours)       Procedure Component Value Units Date/Time    Treponema pallidum AB w/Reflex RPR [[id number]]  (Normal) Collected: 09/09/24 0650    Specimen: Blood Updated: 09/09/24 1202     Treponemal AB Total Non-Reactive    Narrative:      Reactive results will reflex RPR testing.            External Prenatal Results       Pregnancy Outside Results - Transcribed From Office Records - See Scanned Records For Details       Test Value Date Time    ABO  A  09/09/24 0650    Rh  Positive  09/09/24 0650    Antibody Screen  Negative  09/09/24 0650      ^ Normal  01/18/24     Varicella IgG ^ 579.3  01/18/24     Rubella ^ non-immune  01/18/24     Hgb  8.2 g/dL 09/09/24 0650       7.8 g/dL 08/27/24 1241       8.4 g/dL 06/28/24 1049      ^ 10.3 g/dL 01/18/24        11.9 g/dL 12/16/23 0832    Hct  27.1 % 09/09/24 0650      ^ 32 % 01/18/24        38.4 % 12/16/23 0832    HgB A1c        1h GTT       3h GTT Fasting       3h GTT 1 hour       3h GTT 2 hour       3h GTT 3 hour        Gonorrhea (discrete)  Negative  08/27/24 1300      ^ negative  01/18/24     Chlamydia (discrete)  Negative  08/27/24 1300      ^ negative  01/18/24     RPR  Non Reactive  06/28/24 1049      ^ Non-Reactive  01/18/24     Syphils cascade: TP-Ab (FTA)  Non-Reactive  09/09/24 0650    TP-Ab       TP-Ab (EIA)       TPPA       HBsAg ^ Negative  01/18/24     Herpes Simplex Virus PCR       Herpes Simplex VIrus Culture       HIV ^ non-reactive  01/18/24     Hep C RNA Quant PCR       Hep C Antibody ^ non-reactive  01/18/24     AFP       NIPT ^ Low risk  02/22/24     Cystic Fibroisis        Group B Strep  Negative  08/27/24 1300    GBS Susceptibility to Clindamycin       GBS Susceptibility to Erythromycin       Fetal Fibronectin       Genetic Testing, Maternal Blood                  Drug Screening       Test Value Date Time    Urine Drug Screen       Amphetamine Screen ^ Negative  01/18/24        Negative  12/16/23 0958    Barbiturate Screen ^ negative  01/18/24        Negative  12/16/23 0958    Benzodiazepine Screen ^ Negative  01/18/24        Negative  12/16/23 0958    Methadone Screen ^ Negative  01/18/24        Negative  12/16/23 0958    Phencyclidine Screen ^ negative  01/18/24        Negative  12/16/23 0958    Opiates Screen ^ Negative  01/18/24        Negative  12/16/23 0958    THC Screen ^ Negative  01/18/24        Positive  12/16/23 0958    Cocaine Screen ^ negative  01/18/24     Propoxyphene Screen       Buprenorphine Screen  Negative  12/16/23 0958    Methamphetamine Screen       Oxycodone Screen  Negative  12/16/23 0958    Tricyclic Antidepressants Screen  Negative  12/16/23 0958              Legend    ^: Historical                            Assessment & Plan       Full-term PROM with onset of labor within 24 hours of rupture    39 weeks gestation of pregnancy      Rosmery F Pohle is Day 1  post-partum  Vaginal, Spontaneous   .      Plan:  Continue current postpartum plan of care. Lactation suppression education provided. Patient to have sitz bath for perineal rectal discomfort. .      This note has been signed electronically.    Corin Villa DNP, APRN, CNM, RNC-OB  9/10/2024  08:38 CDT
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
## 2024-09-10 NOTE — LACTATION NOTE
Mother is formula feeding. Non-nursing mother's packet given and reviewed. Discussed signs of milk, s/s/tx of engorgement and mastitis. Advised to don compressive bra and wear for 1-2 weeks ensuring adequate milk suppression. Questions addressed.     Suppression of Lactation for Non-Nursing Mothers handout    If you choose not to breastfeed, please let us know if you have any questions about whether yours was the right choice for you and your family.  While there are a very few conditions where breastfeeding is not recommended, most uses surrounding breastfeeding can be managed with proper support.  We are here to hep and support you no matter how you choose to feed your baby.    To suppress further lactation and prevent milk from coming in-- as much as possible:  **Wear a good fitting support bra without an under wire (sports bras are good)   **Wear bra continuously day and night for about 1-2 weeks  **Avoid any kind of breast stimulation such as rubbing, warmth or massage.  ** While showering, try to stand with your back to the water. The warm water will     encourage milk flow.  **Cold compression may be applied for 20 minutes once per hour as needed.  **Anti-Inflammatory medications such as ibuprofen (Motrin) may help.  Ue per your doctors and/or manufacture instructions.  ** If you develop a fever greater than 101 F, especially if you also have flu- like symptoms and any areas of redness or swelling in your breasts, please call your physician. You may need treatment for a condition called mastitis.      The Many Benefits if Breastfeeding   Breastfeeding saves time  *Breastfeeding allows you to calm or feed your baby immediately, which leads to a happier baby who cries less  *There is nothing to buy, prepare, or maintain.There is nothing to clean or sterilize.  Breastfeeding builds a mothers confidence  *She knows all her baby needs to thrive is her!  Breastfeeding saves Money  *There is no formula to buy and  healthier breast fed babies have less medical costs  Healthy Mom/Healthy baby  * babies get sick less often, and when they do they are usually sick less severely and for a shorter time  * babies have fewer ear infections  * babies have fewer allergies  *Mothers who breastfeed have a lower risk for cancer, osteoporosis, anemia, high blood pressure, obesity, and Type ll diabetes  *Mothers miss less work days with sick babies  Breast fed babies have a better dental health  * babies have better jaw development which requires lest orthodontic work  *Breast milk does not promote cavities  * babies can nurse at night without worry of tooth decay  Breastfeeding allows a baby to reach his full IQ potential  *The longer a baby is breast fed, the better their brain development  Breast fed babies and moms are more relaxed  *The hormones released during breastfeeding have a calming effect on mothers  *Breastfeeding requires mom to take a break; this may help mom get more rest after delivery  *Breastfeeding is quicker than preparing formula which allows mom and baby to get back to sleep faster  *Breastfeeding promotes bonding and allows mom to learn babies cues and care needs more quickly  Breastfeeding cleanup is easier  *The bowel movements and spit up of breast fed babies doesn't smell as bad  *Spit-up of breast fed babies doesn't stain clothing  Getting out of the hourse is easier  *No formula bottles to prepare and carry safely   *No time restraints due to worry about what baby will eat  *No worries about warming a bottle or finding safe water to prepare bottles  Breastfeeding mother get their bodies back sooner  *The uterus shrinks more quickly and completely, which allows a flatter tummy  *Breastfeeding burns 400-500 calories a day; making milk torches stored fat!  Breastfeeding is better for the environment  *There is no trash to dispose of after breastfeeding  *There is no  production facility to produce breast milk; moms body does it all without the pollution of a factory      Your Guide to formula feeding your baby by [employer], [employer], [email or web address] games

## 2024-09-10 NOTE — PLAN OF CARE
Goal Outcome Evaluation:           Progress: improving          VSS. FF, ML, U1, scant lochia. PRN pain meds given upon request. Voiding and ambulating. Responding well to infant cues.

## 2024-09-11 VITALS
TEMPERATURE: 98.1 F | DIASTOLIC BLOOD PRESSURE: 67 MMHG | BODY MASS INDEX: 26.11 KG/M2 | SYSTOLIC BLOOD PRESSURE: 109 MMHG | HEIGHT: 58 IN | OXYGEN SATURATION: 100 % | WEIGHT: 124.4 LBS | RESPIRATION RATE: 16 BRPM | HEART RATE: 62 BPM

## 2024-09-11 PROCEDURE — 25010000002 NA FERRIC GLUC CPLX PER 12.5 MG: Performed by: OBSTETRICS & GYNECOLOGY

## 2024-09-11 PROCEDURE — 0503F POSTPARTUM CARE VISIT: CPT | Performed by: OBSTETRICS & GYNECOLOGY

## 2024-09-11 RX ORDER — IBUPROFEN 800 MG/1
800 TABLET, FILM COATED ORAL EVERY 8 HOURS PRN
Qty: 30 TABLET | Refills: 0 | Status: SHIPPED | OUTPATIENT
Start: 2024-09-11

## 2024-09-11 RX ADMIN — DOCUSATE SODIUM 100 MG: 100 CAPSULE, LIQUID FILLED ORAL at 08:35

## 2024-09-11 RX ADMIN — TRAMADOL HYDROCHLORIDE 50 MG: 50 TABLET ORAL at 01:04

## 2024-09-11 RX ADMIN — TRAMADOL HYDROCHLORIDE 50 MG: 50 TABLET ORAL at 08:35

## 2024-09-11 RX ADMIN — PRENATAL VIT W/ FE FUMARATE-FA TAB 27-0.8 MG 1 TABLET: 27-0.8 TAB at 08:35

## 2024-09-11 RX ADMIN — SODIUM CHLORIDE 125 MG: 9 INJECTION, SOLUTION INTRAVENOUS at 10:11

## 2024-09-11 RX ADMIN — ACETAMINOPHEN 650 MG: 325 TABLET ORAL at 00:21

## 2024-09-11 NOTE — PROGRESS NOTES
"      Sonia Latham MD  [facility]  [address] [place] [address]  Office [phone]  Fax [phone]    [facility]  Vaginal Delivery Progress Note    Subjective   Postpartum Day 2: Vaginal Delivery    The patient feels well.  Her pain is well controlled with nonsteroidal anti-inflammatory drugs and Tylenol.   She is ambulating well.  Patient describes her bleeding as thin lochia.    Breastfeeding: declines.    Is not good at taking po iron. Denies dizziness.     Objective     Vital Signs Range for the last 24 hours  Temperature: Temp:  [96.9 °F (36.1 °C)-97.8 °F (36.6 °C)] 97.1 °F (36.2 °C)   Temp Source: Temp src: Temporal   BP: BP: (102-110)/(60-71) 107/71   Pulse: Heart Rate:  [65-70] 70   Respirations: Resp:  [18] 18   SPO2: SpO2:  [98 %-100 %] 100 %   O2 Amount (l/min):     O2 Devices Device (Oxygen Therapy): room air   Weight:       Admit Height:  Height: 147.3 cm (58\")      Physical Exam:  General:  no acute distresss.  Abdomen: abdomen is soft without significant tenderness, masses, organomegaly or guarding.  Extremities: normal, atraumatic, no cyanosis, and trace edema.       Lab results reviewed:  Yes   Rubella:  No results found for: \"RUBELLAIGGIN\" Nurse Transcribed from prenatal record --  No components found for: \"EXTRUBELQUAL\"  Rh Status:    RH type   Date Value Ref Range Status   09/09/2024 Positive  Final     Immunizations:   Immunization History   Administered Date(s) Administered    DTaP 2003, 2003, 2003, 06/15/2004, 06/11/2007    Flu Vaccine Quad PF >36MO 10/18/2019    Fluzone (or Fluarix & Flulaval for VFC) >6mos 10/18/2019, 09/09/2020    HPV Quadrivalent 08/06/2019, 10/18/2019    Hep A, 2 Dose 02/19/2018, 08/22/2018    Hepatitis A 02/19/2018, 08/22/2018    Hepatitis B 2003, 2003, 2003, 06/11/2007    Hepatitis B Adult/Adolescent IM 2003, 2003, 2003, 06/11/2007    HiB 2003, 2003, 06/15/2004    IPV " 2003, 2003, 2003, 06/11/2007    MMR 06/10/2004, 06/11/2007    MMRV 06/11/2007    Meningococcal B,(Bexsero) 10/18/2019    Meningococcal Conjugate 05/19/2015, 08/06/2019    Tdap 05/19/2015, 11/04/2020    Varicella 06/10/2004, 06/11/2007, 08/27/2022     Lab Results (last 24 hours)       Procedure Component Value Units Date/Time    CBC & Differential [[id number]]  (Abnormal) Collected: 09/10/24 1454    Specimen: Blood Updated: 09/10/24 1552    Narrative:      The following orders were created for panel order CBC & Differential.  Procedure                               Abnormality         Status                     ---------                               -----------         ------                     CBC Auto Differential[[id number]]        Abnormal            Final result                 Please view results for these tests on the individual orders.    CBC Auto Differential [[id number]]  (Abnormal) Collected: 09/10/24 1454    Specimen: Blood Updated: 09/10/24 1552     WBC 23.16 10*3/mm3      RBC 3.64 10*6/mm3      Hemoglobin 7.6 g/dL      Hematocrit 25.2 %      MCV 69.2 fL      MCH 20.9 pg      MCHC 30.2 g/dL      RDW 16.2 %      RDW-SD 40.8 fl      MPV 10.9 fL      Platelets 269 10*3/mm3     Narrative:      The previously reported component NRBC is no longer being reported. Previous result was 0.0 /100 WBC (Reference Range: 0.0-0.2 /100 WBC) on 9/10/2024 at 1546 CDT.    Manual Differential [[id number]]  (Abnormal) Collected: 09/10/24 1454    Specimen: Blood Updated: 09/10/24 1552     Neutrophil % 80.8 %      Lymphocyte % 19.2 %      Monocyte % 0.0 %      Eosinophil % 0.0 %      Basophil % 0.0 %      Neutrophils Absolute 18.71 10*3/mm3      Lymphocytes Absolute 4.45 10*3/mm3      Monocytes Absolute 0.00 10*3/mm3      Eosinophils Absolute 0.00 10*3/mm3      Basophils Absolute 0.00 10*3/mm3      Anisocytosis Mod/2+     Microcytes Mod/2+     WBC Morphology Normal     Platelet Morphology Normal             External Prenatal Results       Pregnancy Outside Results - Transcribed From Office Records - See Scanned Records For Details       Test Value Date Time    ABO  A  09/09/24 0650    Rh  Positive  09/09/24 0650    Antibody Screen  Negative  09/09/24 0650      ^ Normal  01/18/24     Varicella IgG ^ 579.3  01/18/24     Rubella ^ non-immune  01/18/24     Hgb  7.6 g/dL 09/10/24 1454       8.2 g/dL 09/09/24 0650       7.8 g/dL 08/27/24 1241       8.4 g/dL 06/28/24 1049      ^ 10.3 g/dL 01/18/24        11.9 g/dL 12/16/23 0832    Hct  25.2 % 09/10/24 1454       27.1 % 09/09/24 0650      ^ 32 % 01/18/24        38.4 % 12/16/23 0832    HgB A1c        1h GTT       3h GTT Fasting       3h GTT 1 hour       3h GTT 2 hour       3h GTT 3 hour        Gonorrhea (discrete)  Negative  08/27/24 1300      ^ negative  01/18/24     Chlamydia (discrete)  Negative  08/27/24 1300      ^ negative  01/18/24     RPR  Non Reactive  06/28/24 1049      ^ Non-Reactive  01/18/24     Syphils cascade: TP-Ab (FTA)  Non-Reactive  09/09/24 0650    TP-Ab       TP-Ab (EIA)       TPPA       HBsAg ^ Negative  01/18/24     Herpes Simplex Virus PCR       Herpes Simplex VIrus Culture       HIV ^ non-reactive  01/18/24     Hep C RNA Quant PCR       Hep C Antibody ^ non-reactive  01/18/24     AFP       NIPT ^ Low risk  02/22/24     Cystic Fibroisis        Group B Strep  Negative  08/27/24 1300    GBS Susceptibility to Clindamycin       GBS Susceptibility to Erythromycin       Fetal Fibronectin       Genetic Testing, Maternal Blood                 Drug Screening       Test Value Date Time    Urine Drug Screen       Amphetamine Screen ^ Negative  01/18/24        Negative  12/16/23 0958    Barbiturate Screen ^ negative  01/18/24        Negative  12/16/23 0958    Benzodiazepine Screen ^ Negative  01/18/24        Negative  12/16/23 0958    Methadone Screen ^ Negative  01/18/24        Negative  12/16/23 0958    Phencyclidine Screen ^ negative  01/18/24         Negative  12/16/23 0958    Opiates Screen ^ Negative  01/18/24        Negative  12/16/23 0958    THC Screen ^ Negative  01/18/24        Positive  12/16/23 0958    Cocaine Screen ^ negative  01/18/24     Propoxyphene Screen       Buprenorphine Screen  Negative  12/16/23 0958    Methamphetamine Screen       Oxycodone Screen  Negative  12/16/23 0958    Tricyclic Antidepressants Screen  Negative  12/16/23 0958              Legend    ^: Historical                            Assessment & Plan       Full-term PROM with onset of labor within 24 hours of rupture    39 weeks gestation of pregnancy      Rosmery SHABBIR Padilla is Day 2  post-partum  Vaginal, Spontaneous   .      Plan:  Discharge home with standard precautions and return to clinic in 4-6 week after iron infusion. Hg 7.6 and pt not good at taking Iron.      Sonia Latham MD  9/11/2024  07:34 CDT

## 2024-09-11 NOTE — DISCHARGE SUMMARY
[facility]    Sonia Latham MD  [address]  [place] [address]  [phone]      Discharge Summary     Deysi Padilla  : 2005  MRN: [id number]  CSN: [id number]    Date of Admission: 2024   Date of Discharge:  2024   Delivering Physician: Sonia Latham        Admission Diagnosis: Pregnancy [Z34.90]   Discharge Diagnosis: Pregnancy at 39w1d - delivered  Iron def anemia       Procedures: 2024  - Vaginal, Spontaneous       Hospital Course  Patient is a 19 y.o.  who at 39w1d had a vaginal birth.  Her postpartum course was without complications.  Pt with pp anemia with hg 7.6. Pt admitted she will not be good at taking po FeSO4. IV iron ordered prior to discharge. On PPD #2 she was ready for discharge.  She had normal lochia and pain well controlled with oral medications.    Infant  male  fetus weighing 3050 g (6 lb 11.6 oz)   Apgars -  8 @ 1 minute /  9 @ 5 minutes.    Discharge labs  Lab Results   Component Value Date    WBC 23.16 (H) 09/10/2024    HGB 7.6 (L) 09/10/2024    HCT 25.2 (L) 09/10/2024     09/10/2024       Discharge Medications     Discharge Medications        ASK your doctor about these medications        Instructions Start Date   ACCRUFeR 30 MG capsule  Generic drug: Ferric Maltol   30 mg, Oral, 2 Times Daily      Bonjesta 20-20 MG tablet controlled-release tablet  Generic drug: doxylamine-pyridoxine ER   1 tablet, Oral, Every Night at Bedtime      loratadine 10 MG tablet  Commonly known as: CLARITIN   10 mg, Oral, Daily      ondansetron ODT 4 MG disintegrating tablet  Commonly known as: ZOFRAN-ODT   4 mg, Translingual, Every 8 Hours PRN      Prenatal 27-1 27-1 MG tablet tablet   1 tablet, Oral, Daily               External Prenatal Results       Pregnancy Outside Results - Transcribed From Office Records - See Scanned Records For Details       Test Value Date Time    ABO  A  24 0650    Rh  Positive   09/09/24 0650    Antibody Screen  Negative  09/09/24 0650      ^ Normal  01/18/24     Varicella IgG ^ 579.3  01/18/24     Rubella ^ non-immune  01/18/24     Hgb  7.6 g/dL 09/10/24 1454       8.2 g/dL 09/09/24 0650       7.8 g/dL 08/27/24 1241       8.4 g/dL 06/28/24 1049      ^ 10.3 g/dL 01/18/24        11.9 g/dL 12/16/23 0832    Hct  25.2 % 09/10/24 1454       27.1 % 09/09/24 0650      ^ 32 % 01/18/24        38.4 % 12/16/23 0832    HgB A1c        1h GTT       3h GTT Fasting       3h GTT 1 hour       3h GTT 2 hour       3h GTT 3 hour        Gonorrhea (discrete)  Negative  08/27/24 1300      ^ negative  01/18/24     Chlamydia (discrete)  Negative  08/27/24 1300      ^ negative  01/18/24     RPR  Non Reactive  06/28/24 1049      ^ Non-Reactive  01/18/24     Syphils cascade: TP-Ab (FTA)  Non-Reactive  09/09/24 0650    TP-Ab       TP-Ab (EIA)       TPPA       HBsAg ^ Negative  01/18/24     Herpes Simplex Virus PCR       Herpes Simplex VIrus Culture       HIV ^ non-reactive  01/18/24     Hep C RNA Quant PCR       Hep C Antibody ^ non-reactive  01/18/24     AFP       NIPT ^ Low risk  02/22/24     Cystic Fibroisis        Group B Strep  Negative  08/27/24 1300    GBS Susceptibility to Clindamycin       GBS Susceptibility to Erythromycin       Fetal Fibronectin       Genetic Testing, Maternal Blood                 Drug Screening       Test Value Date Time    Urine Drug Screen       Amphetamine Screen ^ Negative  01/18/24        Negative  12/16/23 0958    Barbiturate Screen ^ negative  01/18/24        Negative  12/16/23 0958    Benzodiazepine Screen ^ Negative  01/18/24        Negative  12/16/23 0958    Methadone Screen ^ Negative  01/18/24        Negative  12/16/23 0958    Phencyclidine Screen ^ negative  01/18/24        Negative  12/16/23 0958    Opiates Screen ^ Negative  01/18/24        Negative  12/16/23 0958    THC Screen ^ Negative  01/18/24        Positive  12/16/23 0958    Cocaine Screen ^ negative  01/18/24      Propoxyphene Screen       Buprenorphine Screen  Negative  12/16/23 0958    Methamphetamine Screen       Oxycodone Screen  Negative  12/16/23 0958    Tricyclic Antidepressants Screen  Negative  12/16/23 0958              Legend    ^: Historical                            Discharge Disposition Home or Self Care   Condition on Discharge: good   Follow-up: 6 weeks with Eligio Latham MD  9/11/2024

## 2024-09-11 NOTE — PLAN OF CARE
Goal Outcome Evaluation:  Plan of Care Reviewed With: patient, significant other        Progress: improving  Outcome Evaluation: VSS. FFMLU1 scant lochia. Ambulating and voiding w/o difficulty. Pain controlled with PRN Ultram, Motrin, and Tylenol and using aqua k pad. Formula feeding and bonding well w/ infant. Resting in between care.

## 2024-09-12 NOTE — PAYOR COMM NOTE
"REF:  [id number]   NEED ADDITIONAL INPATIENT HOSPITAL DAYS    [facility]  FAX  [phone]      Richard Aranda (19 y.o. Female)       Date of Birth   2005    Social Security Number       Address   [address] [place] [id number]    Home Phone   [phone]    MRN   [id number]       [employer]   [facility]    Marital Status   Single                            Admission Date   9/9/24    Admission Type   Elective    Admitting Provider   Darryl Del Valle MD    Attending Provider       Department, Room/Bed   [facility] MOTHER BABY 2A, M240/1       Discharge Date   9/11/2024    Discharge Disposition   Home or Self Care    Discharge Destination                                 Attending Provider: (none)   Allergies: Blue Dyes (Parenteral), Other    Isolation: None   Infection: ESBL E coli (11/12/23)   Code Status: Prior    Ht: 147.3 cm (58\")   Wt: 56.4 kg (124 lb 6.4 oz)    Admission Cmt: None   Principal Problem: Full-term PROM with onset of labor within 24 hours of rupture [O42.02]                   Active Insurance as of 9/9/2024       Primary Coverage       Payor Plan Insurance Group Employer/Plan Group    [employer] [place] [employer]ICA[employer]        Payor Plan Address Payor Plan Phone Number Payor Plan Fax Number Effective Dates    [facility] [address] [phone]  9/5/2018 - None Entered    [facility] [id number]         Subscriber Name Subscriber Birth Date Member ID       RICHARD ARANDA 2005 [id number]                     Emergency Contacts        (Rel.) Home Phone Work Phone Mobile Phone    JENN ZIMMERMAN (Significant Other) [phone]                 Discharge Summary        Sonia Latham MD at 09/11/24 0843          [facility]    Sonia Latham MD  [address]  [place] [address]  [phone]      Discharge Summary    [facility] " Valerie  : 2005  MRN: [id number]  CSN: [id number]    Date of Admission: 2024   Date of Discharge:  2024   Delivering Physician: Sonia Latham        Admission Diagnosis: Pregnancy [Z34.90]   Discharge Diagnosis: Pregnancy at 39w1d - delivered  Iron def anemia       Procedures: 2024  - Vaginal, Spontaneous       Hospital Course  Patient is a 19 y.o.  who at 39w1d had a vaginal birth.  Her postpartum course was without complications.  Pt with pp anemia with hg 7.6. Pt admitted she will not be good at taking po FeSO4. IV iron ordered prior to discharge. On PPD #2 she was ready for discharge.  She had normal lochia and pain well controlled with oral medications.    Infant  male  fetus weighing 3050 g (6 lb 11.6 oz)   Apgars -  8 @ 1 minute /  9 @ 5 minutes.    Discharge labs  Lab Results   Component Value Date    WBC 23.16 (H) 09/10/2024    HGB 7.6 (L) 09/10/2024    HCT 25.2 (L) 09/10/2024     09/10/2024       Discharge Medications     Discharge Medications        ASK your doctor about these medications        Instructions Start Date   ACCRUFeR 30 MG capsule  Generic drug: Ferric Maltol   30 mg, Oral, 2 Times Daily      Bonjesta 20-20 MG tablet controlled-release tablet  Generic drug: doxylamine-pyridoxine ER   1 tablet, Oral, Every Night at Bedtime      loratadine 10 MG tablet  Commonly known as: CLARITIN   10 mg, Oral, Daily      ondansetron ODT 4 MG disintegrating tablet  Commonly known as: ZOFRAN-ODT   4 mg, Translingual, Every 8 Hours PRN      Prenatal 27-1 27-1 MG tablet tablet   1 tablet, Oral, Daily               External Prenatal Results       Pregnancy Outside Results - Transcribed From Office Records - See Scanned Records For Details       Test Value Date Time    ABO  A  24 0650    Rh  Positive  24 0650    Antibody Screen  Negative  24 0650      ^ Normal  24     Varicella IgG ^ 579.3  24     Rubella ^ non-immune  24     Hgb  7.6 g/dL  09/10/24 1454       8.2 g/dL 09/09/24 0650       7.8 g/dL 08/27/24 1241       8.4 g/dL 06/28/24 1049      ^ 10.3 g/dL 01/18/24        11.9 g/dL 12/16/23 0832    Hct  25.2 % 09/10/24 1454       27.1 % 09/09/24 0650      ^ 32 % 01/18/24        38.4 % 12/16/23 0832    HgB A1c        1h GTT       3h GTT Fasting       3h GTT 1 hour       3h GTT 2 hour       3h GTT 3 hour        Gonorrhea (discrete)  Negative  08/27/24 1300      ^ negative  01/18/24     Chlamydia (discrete)  Negative  08/27/24 1300      ^ negative  01/18/24     RPR  Non Reactive  06/28/24 1049      ^ Non-Reactive  01/18/24     Syphils cascade: TP-Ab (FTA)  Non-Reactive  09/09/24 0650    TP-Ab       TP-Ab (EIA)       TPPA       HBsAg ^ Negative  01/18/24     Herpes Simplex Virus PCR       Herpes Simplex VIrus Culture       HIV ^ non-reactive  01/18/24     Hep C RNA Quant PCR       Hep C Antibody ^ non-reactive  01/18/24     AFP       NIPT ^ Low risk  02/22/24     Cystic Fibroisis        Group B Strep  Negative  08/27/24 1300    GBS Susceptibility to Clindamycin       GBS Susceptibility to Erythromycin       Fetal Fibronectin       Genetic Testing, Maternal Blood                 Drug Screening       Test Value Date Time    Urine Drug Screen       Amphetamine Screen ^ Negative  01/18/24        Negative  12/16/23 0958    Barbiturate Screen ^ negative  01/18/24        Negative  12/16/23 0958    Benzodiazepine Screen ^ Negative  01/18/24        Negative  12/16/23 0958    Methadone Screen ^ Negative  01/18/24        Negative  12/16/23 0958    Phencyclidine Screen ^ negative  01/18/24        Negative  12/16/23 0958    Opiates Screen ^ Negative  01/18/24        Negative  12/16/23 0958    THC Screen ^ Negative  01/18/24        Positive  12/16/23 0958    Cocaine Screen ^ negative  01/18/24     Propoxyphene Screen       Buprenorphine Screen  Negative  12/16/23 0958    Methamphetamine Screen       Oxycodone Screen  Negative  12/16/23 0958    Tricyclic Antidepressants  Screen  Negative  12/16/23 0958              Legend    ^: Historical                            Discharge Disposition Home or Self Care   Condition on Discharge: good   Follow-up: 6 weeks with Eligio Latham MD  9/11/2024       Electronically signed by Sonia Latham MD at 09/11/24 0844       Discharge Order (From admission, onward)       Start     Ordered    09/11/24 0846  Discharge patient  Once        Expected Discharge Date: 09/11/24   Discharge Disposition: Home or Self Care   Physician of Record for Attribution - Please select from Treatment Team: LANDON BUSTAMANTE [[id number]]   Review needed by CMO to determine Physician of Record: No      Question Answer Comment   Physician of Record for Attribution - Please select from Treatment Team LANDON BUSTAMANTE    Review needed by CMO to determine Physician of Record No        09/11/24 0845    09/09/24 0605  Discharge patient  Once,   Status:  Canceled        Expected Discharge Date: 09/09/24   Discharge Disposition: Home or Self Care   Physician of Record for Attribution - Please select from Treatment Team: EUGENE LUCIA [[id number]]   Review needed by CMO to determine Physician of Record: No      Question Answer Comment   Physician of Record for Attribution - Please select from Treatment Team EUGENE LUCIA    Review needed by CMO to determine Physician of Record No        09/09/24 0604

## 2024-11-07 ENCOUNTER — POSTPARTUM VISIT (OUTPATIENT)
Age: 19
End: 2024-11-07
Payer: COMMERCIAL

## 2024-11-07 VITALS
HEIGHT: 58 IN | WEIGHT: 91.4 LBS | SYSTOLIC BLOOD PRESSURE: 102 MMHG | DIASTOLIC BLOOD PRESSURE: 66 MMHG | BODY MASS INDEX: 19.19 KG/M2

## 2024-11-07 DIAGNOSIS — Z30.09 ENCOUNTER FOR COUNSELING REGARDING CONTRACEPTION: ICD-10-CM

## 2024-11-07 NOTE — PROGRESS NOTES
Subjective   Chief Complaint   Patient presents with    Postpartum Care     Pt here for 6wk postpartum appt, had vaginal delivery 2024 with Dr. Latham, had baby boy, [place], formula feeding only, and denies any problems, PPD Screening 2     Rosmeryleah Padilla is a 19 y.o. year old  presenting to be seen for her postpartum visit.  She had a vaginal delivery.   Prenatal course was  complicated by anemia and US showing nuchal fold thickening.    Since delivery she has been sexually active.  She does not have concerns about post-partum blues/depression. She does state she struggles with anxiety, but says she is coping.  She is bottle feeding.    The following portions of the patient's history were reviewed and updated as appropriate:problem list, current medications, and allergies    Social History     Socioeconomic History    Marital status: Single   Tobacco Use    Smoking status: Former     Current packs/day: 0.00     Types: Cigarettes     Quit date: 2020     Years since quittin.7    Smokeless tobacco: Never   Vaping Use    Vaping status: Every Day    Substances: Nicotine   Substance and Sexual Activity    Alcohol use: No    Drug use: Yes     Types: Marijuana     Comment: last use 24    Sexual activity: Yes     Partners: Male     Birth control/protection: None     Review of Systems   Constitutional: Negative.    HENT: Negative.     Eyes: Negative.    Respiratory: Negative.     Cardiovascular: Negative.    Gastrointestinal: Negative.    Endocrine: Negative.    Genitourinary: Negative.    Musculoskeletal: Negative.    Skin: Negative.    Allergic/Immunologic: Negative.    Neurological: Negative.    Hematological: Negative.    Psychiatric/Behavioral:  Negative for agitation, behavioral problems, confusion, decreased concentration, dysphoric mood, hallucinations, self-injury, sleep disturbance and suicidal ideas. The patient is nervous/anxious. The patient is not hyperactive.          Objective  "  /66 (BP Location: Left arm, Patient Position: Sitting, Cuff Size: Adult)   Ht 147.3 cm (58\")   Wt 41.5 kg (91 lb 6.4 oz)   LMP 10/15/2024 (Approximate)   Breastfeeding No   BMI 19.10 kg/m²     General:  well developed; well nourished  no acute distress   Abdomen: soft, non-tender; bowel sounds normal; no masses, no organomegaly   Pelvis: Not performed.        Diagnoses and all orders for this visit:    1. Postpartum care following vaginal delivery (Primary)  - Normal 6-week postpartum exam. Counseled on release to gradually resume regular activities of daily living.   -May resume sexually intimacy when she feels ready but encouraged to use lots of lubricant and take things slowly. If it feels uncomfortable, stop and try again later.  -Encouraged to practice kegel exercises for pelvic floor strength/integrity for both bladder control and for sexual wellness. Information included in AVS.  - Encouraged to take time to care for self and discussed measures of support (relaxation, meditation, warm epsom salt bath, rest, exercise, aromatherapy) as part of healthy lifestyle for dealing with stressors. Also, discussed postpartum emotions and encouraged to notify provider of concerns with feelings of depression or anxiety.    - Return to office annually for a well woman exam and as needed for any concerns.     2. Encounter for counseling regarding contraception  An intrauterine device (IUD) is a medical device that is inserted into the uterus to prevent pregnancy. It is a small, T-shaped device that has one or two nylon strings hanging down from it. The strings hang out of the lower part of the uterus (cervix) to allow for future IUD removal.  Hormonal (progestin) IUD. This type of IUD lasts 3-8 years for prevention of pregnancy,.  -An IUD is inserted through the vagina, cervix, and into the uterus with a minor medical procedure.   -The IUD prevents pregnancy by thickening cervical mucus, preventing sperm from " reaching the uterus and by keeping the lining of the uterus thin    Advantages of a hormonal IUD  It works right after it is inserted.  Nothing to remember on a daily/weekly basis for birth control  It can be used for up to 8 years depending on type.  What are the disadvantages of an IUD?  An IUD may cause irregular menstrual bleeding for a period of time after insertion.  It is common to have pain during insertion and have cramping and vaginal bleeding after insertion.  Uterine perforation; this is rare. Severe abdominal pain and an inability to palpate strings would be a reason to suspect this.  (PID) may happen after insertion of an IUD. The infection is usually from an unknown (STI). This is rare, and usually happens during the first 20 days after the IUD is inserted.  A hormonal IUD can make your menstrual flow very light, or go away completely.  IUDs cannot prevent (STIs).    You may feel some pain during insertion and removal. I recommend taking OTC pain medicine, such as ibuprofen before an IUD procedure.   -  Levonorgestrel (MIRENA) 20 MCG/DAY intrauterine device IUD; To be inserted one time by prescriber. Route intrauterine.  Dispense: 1 each; Refill: 0  -  Mirena consent signed today.    This note was electronically signed.  Ana Hargrove CNM APRNIKOLAY  November 7, 2024

## 2024-11-13 ENCOUNTER — PROCEDURE VISIT (OUTPATIENT)
Dept: OBSTETRICS AND GYNECOLOGY | Age: 19
End: 2024-11-13
Payer: COMMERCIAL

## 2024-11-13 VITALS
DIASTOLIC BLOOD PRESSURE: 64 MMHG | SYSTOLIC BLOOD PRESSURE: 110 MMHG | WEIGHT: 91 LBS | BODY MASS INDEX: 19.1 KG/M2 | HEIGHT: 58 IN

## 2024-11-13 DIAGNOSIS — Z30.430 ENCOUNTER FOR INITIAL INSERTION OF INTRAUTERINE CONTRACEPTIVE DEVICE: Primary | ICD-10-CM

## 2024-11-13 NOTE — PROGRESS NOTES
Subjective   Rosmery Padilla is a 19 y.o. female.     History of Present Illness  The patient presents to the office today for Mirena IUD insertion.   Contraception  Visit:  Initial  Current contraceptive method:  Birth control pills  Compliance problems:  None  Treatments tried:  Birth control pills  Risk factors:  No known risk factors             Review of Systems   Constitutional: Negative.    HENT: Negative.     Eyes: Negative.    Respiratory: Negative.     Cardiovascular: Negative.    Gastrointestinal: Negative.    Endocrine: Negative.    Genitourinary: Negative.    Musculoskeletal: Negative.    Skin: Negative.    Allergic/Immunologic: Negative.    Neurological: Negative.    Hematological: Negative.    Psychiatric/Behavioral: Negative.         Objective   Physical Exam  Vitals and nursing note reviewed. Exam conducted with a chaperone present.   Constitutional:       Appearance: She is well-developed.   HENT:      Head: Normocephalic and atraumatic.   Abdominal:      General: There is no distension.      Palpations: Abdomen is soft.      Tenderness: There is no abdominal tenderness.   Genitourinary:     Labia:         Right: No rash, tenderness, lesion or injury.         Left: No rash, tenderness, lesion or injury.       Vagina: Normal. No vaginal discharge, erythema or tenderness.      Cervix: No cervical motion tenderness, discharge or friability.      Uterus: Not deviated, not enlarged and not tender.       Adnexa:         Right: No mass, tenderness or fullness.          Left: No mass, tenderness or fullness.        Comments: Patient on cycle, moderate flow   Skin:     General: Skin is warm and dry.   Neurological:      Mental Status: She is alert and oriented to person, place, and time.   Psychiatric:         Behavior: Behavior normal.         Thought Content: Thought content normal.         Judgment: Judgment normal.       Assessment & Plan   Diagnoses and all orders for this visit:    1. Encounter for  "initial insertion of intrauterine contraceptive device (Primary)  Comments:  The patient presents to the office today for Mirena IUD insertion. UPT negative. G/C testing completed. After care discussed. Patient voices understanding. ER precautions discussed. 4 wk f/u ov/us.  Orders:  -     POC Pregnancy, Urine  -     NuSwab VG+ - Swab, Cervix       Rosmery Padilla is a 19 y.o. female  is here for IUD insertion.  Last menstrual period was 2024.  Pap smear screening has yet to be completed.  She has no history of cervical dysplasia.    /64   Ht 147.3 cm (58\")   Wt 41.3 kg (91 lb)   LMP 2024 (Approximate)   Breastfeeding No   BMI 19.02 kg/m²      A urine pregnancy test in the office today is negative.    We have discussed the IUD, common side effects, and potential adverse events like uterine perforation, infection, or expulsion.  She has signed the consent form after answering her questions. A verbal timeout was completed with myself and the patient prior to the beginning of the procedure to verify correct patient, , and procedure to be completed. All information was verified correctly.     MIRENA IUD lot number [id number] was placed today.  The cervix was visualized and a sample was obtained for gonorrhea and chlamydia testing. The cervix was then cleansed with BETADINE swabs.  The anterior lip was grasped with a single-tooth tenaculum.  The uterus sounded to 8 cm.  The IUD was placed at the uterine fundus using sterile technique in a standard fashion.  The applicator was removed and the strings trimmed to 3 cm length.  The tenaculum site was made hemostatic with silver nitrate sticks. She tolerated the procedure well.    Assessment: IUD placement.    Rosmery Padilla will return in one month for an IUD check.  She is given instructions to call if she has any fevers, heavy bleeding, severe pain, or nausea.         ALEJANDRA De La Cruz   "

## 2024-11-15 LAB
A VAGINAE DNA VAG QL NAA+PROBE: NORMAL SCORE
BVAB2 DNA VAG QL NAA+PROBE: NORMAL SCORE
C ALBICANS DNA VAG QL NAA+PROBE: NEGATIVE
C GLABRATA DNA VAG QL NAA+PROBE: NEGATIVE
C TRACH DNA SPEC QL NAA+PROBE: NEGATIVE
MEGA1 DNA VAG QL NAA+PROBE: NORMAL SCORE
N GONORRHOEA DNA VAG QL NAA+PROBE: NEGATIVE
T VAGINALIS DNA VAG QL NAA+PROBE: NEGATIVE

## 2024-12-10 ENCOUNTER — OFFICE VISIT (OUTPATIENT)
Dept: OBSTETRICS AND GYNECOLOGY | Age: 19
End: 2024-12-10
Payer: COMMERCIAL

## 2024-12-10 VITALS
SYSTOLIC BLOOD PRESSURE: 106 MMHG | BODY MASS INDEX: 19.2 KG/M2 | DIASTOLIC BLOOD PRESSURE: 70 MMHG | WEIGHT: 89 LBS | HEIGHT: 57 IN

## 2024-12-10 DIAGNOSIS — Z30.431 SURVEILLANCE OF (INTRAUTERINE) CONTRACEPTIVE DEVICE: Primary | ICD-10-CM

## 2024-12-10 NOTE — PROGRESS NOTES
"Chief Complaint   Patient presents with    Contraception     Patient is here for her 4 week GYN follow up regarding IUD placement. Patient does states she is still spotting since insertion. Patient had an in office US today 12/10/2024. Patient denies any other problems or concerns.        History:  Rosmery Padilla is a 19 y.o. female who presents today for follow-up for evaluation of the above:  The patient presents to the office for her 4 week post insertion ultrasound for IUD placement check.        ROS:  Review of Systems   Constitutional: Negative.    HENT: Negative.     Eyes: Negative.    Respiratory: Negative.     Cardiovascular: Negative.    Gastrointestinal: Negative.    Endocrine: Negative.    Genitourinary:  Positive for vaginal bleeding (intermittent spotting/bleeding post IUD insertion). Negative for flank pain, pelvic pain and pelvic pressure.   Musculoskeletal: Negative.    Skin: Negative.    Allergic/Immunologic: Negative.    Neurological: Negative.    Hematological: Negative.    Psychiatric/Behavioral: Negative.         Ms. Pdailla  reports that she quit smoking about 4 years ago. Her smoking use included cigarettes. She has never used smokeless tobacco. She reports current drug use. Drug: Marijuana. She reports that she does not drink alcohol.      Current Outpatient Medications:     Levonorgestrel (MIRENA) 20 MCG/DAY intrauterine device IUD, To be inserted one time by prescriber. Route intrauterine., Disp: 1 each, Rfl: 0      OBJECTIVE:  /70   Ht 144.8 cm (57\")   Wt 40.4 kg (89 lb)   LMP 11/12/2024 (Approximate)   BMI 19.26 kg/m²    Physical Exam  Vitals and nursing note reviewed.   Constitutional:       General: She is not in acute distress.     Appearance: Normal appearance. She is not ill-appearing or diaphoretic.   HENT:      Head: Normocephalic and atraumatic.   Pulmonary:      Effort: Pulmonary effort is normal. No respiratory distress.   Musculoskeletal:         General: No " deformity.      Cervical back: Normal range of motion.   Skin:     Coloration: Skin is not pale.   Neurological:      General: No focal deficit present.      Mental Status: She is alert.   Psychiatric:         Mood and Affect: Mood normal.         Behavior: Behavior normal.         Thought Content: Thought content normal.         Judgment: Judgment normal.       Assessment/Plan    Diagnoses and all orders for this visit:    1. Surveillance of (intrauterine) contraceptive device (Primary)  Comments:  Patient presents today for her 4 week post insertion IUD check with ultrasound. She reports intermittent bleeding/spotting still but denies pain. Ultrasound today shows IUD in normal position. Patient will schedule first WWE upon leaving the office today.           An After Visit Summary was printed and given to the patient at discharge.  Return in about 6 months (around 6/9/2025) for Annual physical. Sooner if problems arise.          Kelsey Salomon, APRN

## 2025-03-17 ENCOUNTER — OFFICE VISIT (OUTPATIENT)
Dept: OBSTETRICS AND GYNECOLOGY | Age: 20
End: 2025-03-17
Payer: COMMERCIAL

## 2025-03-17 VITALS
BODY MASS INDEX: 19.63 KG/M2 | WEIGHT: 91 LBS | HEIGHT: 57 IN | SYSTOLIC BLOOD PRESSURE: 105 MMHG | DIASTOLIC BLOOD PRESSURE: 71 MMHG

## 2025-03-17 DIAGNOSIS — Z30.09 ENCOUNTER FOR COUNSELING REGARDING CONTRACEPTION: Primary | ICD-10-CM

## 2025-03-17 NOTE — PROGRESS NOTES
Subjective   Rosmery Padilla is a 19 y.o. female.     History of Present Illness  The patient presents to [facility] office today to discuss her Mirena IUD.  Contraception  Visit:  Follow-up  Side effects:  Weight gain and unscheduled bleeding  Compliance problems:  None  Treatments tried:  Birth control pills  Risk factors:  No known risk factors             Review of Systems   Constitutional: Negative.    HENT: Negative.     Eyes: Negative.    Respiratory: Negative.     Cardiovascular: Negative.    Gastrointestinal: Negative.    Endocrine: Negative.    Genitourinary: Negative.    Musculoskeletal: Negative.    Skin: Negative.    Allergic/Immunologic: Negative.    Neurological: Negative.    Hematological: Negative.    Psychiatric/Behavioral: Negative.         Objective   Physical Exam  Vitals and nursing note reviewed.   Constitutional:       General: She is not in acute distress.     Appearance: Normal appearance. She is not ill-appearing or diaphoretic.   HENT:      Head: Normocephalic and atraumatic.   Pulmonary:      Effort: Pulmonary effort is normal. No respiratory distress.   Musculoskeletal:         General: No deformity.      Cervical back: Normal range of motion.   Skin:     Coloration: Skin is not pale.   Neurological:      General: No focal deficit present.      Mental Status: She is alert.   Psychiatric:         Mood and Affect: Mood normal.         Behavior: Behavior normal.         Thought Content: Thought content normal.         Judgment: Judgment normal.           Assessment & Plan   Diagnoses and all orders for this visit:    1. Encounter for counseling regarding contraception (Primary)  Comments:  She presents to the office to discuss her current IUD that has been in place since 9/24. She reports some intermittent spotting, an instance of post coital bleeding, and some weight gain since insertion. Discussed with patient as she is at the 4 month post-insertion gonzalez that intermittent spotting is  normal with an IUD in place. Offered testing for infection due to post coital bleeding, she declines today. Reviewed previous ultrasound showing IUD in normal placement. She thinks bleeding could be from vaginal dryness. I did provide samples of uberlube to her today. At this time, she does wish to keep the IUD in place. She will follow up at her next scheduled office visit or sooner if needed.       BMI is within normal parameters. No other follow-up for BMI required.       Kelsey Salomon, APRN

## 2025-07-07 ENCOUNTER — HOSPITAL ENCOUNTER (EMERGENCY)
Facility: HOSPITAL | Age: 20
Discharge: HOME | End: 2025-07-07
Admitting: EMERGENCY MEDICINE
Payer: COMMERCIAL

## 2025-07-07 ENCOUNTER — APPOINTMENT (OUTPATIENT)
Dept: GENERAL RADIOLOGY | Facility: HOSPITAL | Age: 20
End: 2025-07-07
Payer: COMMERCIAL

## 2025-07-07 VITALS
HEIGHT: 56 IN | BODY MASS INDEX: 21.59 KG/M2 | HEART RATE: 64 BPM | DIASTOLIC BLOOD PRESSURE: 55 MMHG | TEMPERATURE: 98.4 F | WEIGHT: 96 LBS | SYSTOLIC BLOOD PRESSURE: 96 MMHG | OXYGEN SATURATION: 99 % | RESPIRATION RATE: 18 BRPM

## 2025-07-07 DIAGNOSIS — S62.91XA CLOSED FRACTURE OF RIGHT HAND, INITIAL ENCOUNTER: Primary | ICD-10-CM

## 2025-07-07 PROCEDURE — 73130 X-RAY EXAM OF HAND: CPT

## 2025-07-07 PROCEDURE — 99283 EMERGENCY DEPT VISIT LOW MDM: CPT

## 2025-07-07 RX ORDER — HYDROCODONE BITARTRATE AND ACETAMINOPHEN 5; 325 MG/1; MG/1
1 TABLET ORAL EVERY 6 HOURS PRN
Qty: 15 TABLET | Refills: 0 | Status: SHIPPED | OUTPATIENT
Start: 2025-07-07

## 2025-07-07 NOTE — ED PROVIDER NOTES
Subjective   History of Present Illness  Patient is a 20-year-old female presents to the emergency department with right hand injury.  She states that she was wrestling with her boyfriend last night and injured her right fifth digit.  She states this occurred around 3:00 this morning.  She denies any other injury.    History provided by:  Patient   used: No        Review of Systems   Constitutional: Negative.    HENT: Negative.     Eyes: Negative.    Respiratory: Negative.     Cardiovascular: Negative.    Gastrointestinal: Negative.    Endocrine: Negative.    Genitourinary: Negative.    Musculoskeletal:         Patient is a 20-year-old female presents to the emergency department with right hand injury.  She states that she was wrestling with her boyfriend last night and injured her right fifth digit.  She states this occurred around 3:00 this morning.  She denies any other injury.     Skin: Negative.    Allergic/Immunologic: Negative.    Neurological: Negative.    Hematological: Negative.    Psychiatric/Behavioral: Negative.     All other systems reviewed and are negative.      Past Medical History:   Diagnosis Date    ADHD     Allergic     Anxiety     Asthma     Depression     History of transfusion     Hyperemesis gravidarum 2022       Allergies   Allergen Reactions    Blue Dyes (Parenteral) Rash    Other Rash      Blackberries       Past Surgical History:   Procedure Laterality Date    D & C WITH SUCTION      DILATATION AND CURETTAGE N/A 07/01/2023    Procedure: DILATATION AND CURETTAGE;  Surgeon: Darryl Del Valle MD;  Location: [facility] OR;  Service: Obstetrics/Gynecology;  Laterality: N/A;    FOOT SURGERY         Family History   Problem Relation Age of Onset    Heart attack Father     Diabetes Father     Alcohol abuse Father     Arthritis Mother     Depression Mother     Dementia Mother     Hypertension Mother     Hypertension Maternal Grandmother     Diabetes Maternal Grandfather      "Hypertension Maternal Grandfather     Heart disease Maternal Grandfather     Breast cancer Neg Hx     Ovarian cancer Neg Hx     Uterine cancer Neg Hx     Colon cancer Neg Hx     Melanoma Neg Hx        Social History     Socioeconomic History    Marital status: Single   Tobacco Use    Smoking status: Former     Current packs/day: 0.00     Types: Cigarettes     Quit date: 2020     Years since quittin.3    Smokeless tobacco: Never   Vaping Use    Vaping status: Every Day    Substances: Nicotine    Devices: Disposable   Substance and Sexual Activity    Alcohol use: No    Drug use: Yes     Types: Marijuana     Comment: last use 24    Sexual activity: Yes     Partners: Male     Birth control/protection: I.U.D.       Prior to Admission medications    Medication Sig Start Date End Date Taking? Authorizing Provider   Levonorgestrel (MIRENA) 20 MCG/DAY intrauterine device IUD To be inserted one time by prescriber. Route intrauterine. 24  Ana Hargrove CNM   norelgestromin-ethinyl estradiol (ORTHO EVRA) 150-35 MCG/24HR Place 1 patch on the skin as directed by provider 1 (One) Time Per Week. 10/28/21 2/2/22  Joyce Patiño,        BP 96/51 (BP Location: Right arm, Patient Position: Sitting)   Pulse 72   Temp 98.4 °F (36.9 °C) (Oral)   Resp 20   Ht 142.2 cm (56\")   Wt 43.5 kg (96 lb)   SpO2 99%   BMI 21.52 kg/m²     Objective   Physical Exam  Vitals and nursing note reviewed.   Constitutional:       Appearance: She is well-developed.   HENT:      Head: Normocephalic and atraumatic.   Eyes:      Conjunctiva/sclera: Conjunctivae normal.      Pupils: Pupils are equal, round, and reactive to light.   Neck:      Thyroid: No thyromegaly.      Trachea: No tracheal deviation.   Cardiovascular:      Rate and Rhythm: Normal rate and regular rhythm.      Heart sounds: Normal heart sounds.   Pulmonary:      Effort: Pulmonary effort is normal. No respiratory distress.      Breath sounds: Normal " breath sounds. No wheezing or rales.   Chest:      Chest wall: No tenderness.   Musculoskeletal:      Cervical back: Normal range of motion and neck supple.      Comments: RUE: there is soft tissue swelling of the right 5th digit. Diffic with extension pain on palpation and with any movement. Periph pulses palp. Pt is right hand dominant.    Skin:     General: Skin is warm and dry.   Neurological:      Mental Status: She is alert and oriented to person, place, and time.      Cranial Nerves: No cranial nerve deficit.      Deep Tendon Reflexes: Reflexes are normal and symmetric.   Psychiatric:         Behavior: Behavior normal.         Thought Content: Thought content normal.         Judgment: Judgment normal.         Splint - Cast - Strapping    Date/Time: 7/7/2025 2:29 PM    Performed by: Yadira Arboleda APRN  Authorized by: Yadira Arboleda APRN    Consent:     Consent obtained:  Verbal    Consent given by:  Patient    Risks, benefits, and alternatives were discussed: yes      Risks discussed:  Discoloration, numbness, pain and swelling    Alternatives discussed:  No treatment  Universal protocol:     Procedure explained and questions answered to patient or proxy's satisfaction: yes      Relevant documents present and verified: yes      Test results available: yes      Imaging studies available: yes      Patient identity confirmed:  Verbally with patient, arm band and provided demographic data  Pre-procedure details:     Distal neurologic exam:  Normal    Distal perfusion: distal pulses strong    Procedure details:     Location:  Finger    Finger location:  R small finger    Splint type:  Ulnar gutter    Supplies:  Cotton padding and fiberglass  Post-procedure details:     Distal neurologic exam:  Normal    Distal perfusion: brisk capillary refill      Procedure completion:  Tolerated well, no immediate complications           Lab Results (last 24 hours)       ** No results found for the last 24 hours.  **            XR Hand 3+ View Right   Final Result       1.  Acute, oblique fracture of the proximal fifth phalanx.            This report was signed and finalized on 7/7/2025 1:04 PM by Dr. Nakul Oneill MD.              ED Course  ED Course as of 07/07/25 1432   Mon Jul 07, 2025   1353 Spoke with Dr.Ryan Alvarez with orthopedics. Advised to place in ulnar/gutter splint and follow up with Dr. Cifuentes. Will prescribe small amt of pain medication for acute pain. Reviewed side effects and potential for abuse. Garth report completed and no signs of suspicious activity noted. Will prescribe small amt of pain medication. Reviewed side effects and potential for abuse  [CW]      ED Course User Index  [CW] Yadira Arboleda APRN        Medical Decision Making  Patient is a 20-year-old female presents to the emergency department with right hand injury.  She states that she was wrestling with her boyfriend last night and injured her right fifth digit.  She states this occurred around 3:00 this morning.  She denies any other injury.  Course of treatment in the er: non toxic appearing. No acute distress. Right upper extremity RUE: there is soft tissue swelling of the right 5th digit. Diffic with extension pain on palpation and with any movement. Periph pulses palp. Pt is right hand dominant. Xray of the right hand has been ordered  Differential diagnosis to include but not limited to: right hand fracture; dislocation; contusion; and other   XR Hand 3+ View Right   Final Result         1.  Acute, oblique fracture of the proximal fifth phalanx.               This report was signed and finalized on 7/7/2025 1:04 PM by Dr. Nakul Oneill MD.          Spoke with Dr.Ryan Alvarez with orthopedics. Advised to place in ulnar/gutter splint and follow up with Dr. Cifuentes. Will prescribe small amt of pain medication for acute pain. Reviewed side effects and potential for abuse. Garth report completed and no signs of suspicious  activity noted. Will prescribe small amt of pain medication. Reviewed side effects and potential for abuse       Problems Addressed:  Closed fracture of right hand, initial encounter: complicated acute illness or injury    Amount and/or Complexity of Data Reviewed  Radiology: ordered. Decision-making details documented in ED Course.    Risk  Prescription drug management.         Final diagnoses:   Closed fracture of right hand, initial encounter          Yadira Arboleda, APRN  07/07/25 [id number]

## 2025-07-07 NOTE — DISCHARGE INSTRUCTIONS
Return to ER if symptoms worsen   Elevate/ ice   Follow up with orthopedics- call dr. Cifuentes's office this week for appointment  Keep splint clean and dry

## 2025-07-09 ENCOUNTER — OFFICE VISIT (OUTPATIENT)
Age: 20
End: 2025-07-09
Payer: MEDICAID

## 2025-07-09 VITALS — HEIGHT: 56 IN | BODY MASS INDEX: 21.37 KG/M2 | WEIGHT: 95 LBS

## 2025-07-09 DIAGNOSIS — S62.646A CLOSED NONDISPLACED FRACTURE OF PROXIMAL PHALANX OF RIGHT LITTLE FINGER, INITIAL ENCOUNTER: Primary | ICD-10-CM

## 2025-07-09 PROCEDURE — G8420 CALC BMI NORM PARAMETERS: HCPCS | Performed by: PHYSICIAN ASSISTANT

## 2025-07-09 PROCEDURE — 1036F TOBACCO NON-USER: CPT | Performed by: PHYSICIAN ASSISTANT

## 2025-07-09 PROCEDURE — 99203 OFFICE O/P NEW LOW 30 MIN: CPT | Performed by: PHYSICIAN ASSISTANT

## 2025-07-09 PROCEDURE — G8427 DOCREV CUR MEDS BY ELIG CLIN: HCPCS | Performed by: PHYSICIAN ASSISTANT

## 2025-07-29 ENCOUNTER — APPOINTMENT (OUTPATIENT)
Dept: ULTRASOUND IMAGING | Facility: HOSPITAL | Age: 20
End: 2025-07-29
Payer: COMMERCIAL

## 2025-07-29 ENCOUNTER — HOSPITAL ENCOUNTER (INPATIENT)
Facility: HOSPITAL | Age: 20
LOS: 3 days | Discharge: HOME | End: 2025-08-01
Attending: EMERGENCY MEDICINE | Admitting: STUDENT IN AN ORGANIZED HEALTH CARE EDUCATION/TRAINING PROGRAM
Payer: COMMERCIAL

## 2025-07-29 ENCOUNTER — APPOINTMENT (OUTPATIENT)
Dept: CT IMAGING | Facility: HOSPITAL | Age: 20
End: 2025-07-29
Payer: COMMERCIAL

## 2025-07-29 DIAGNOSIS — E86.0 DEHYDRATION: ICD-10-CM

## 2025-07-29 DIAGNOSIS — E87.20 ACIDOSIS: ICD-10-CM

## 2025-07-29 DIAGNOSIS — K52.9 ENTEROCOLITIS: ICD-10-CM

## 2025-07-29 DIAGNOSIS — K52.9 GASTROENTERITIS: Primary | ICD-10-CM

## 2025-07-29 DIAGNOSIS — R73.9 HYPERGLYCEMIA: ICD-10-CM

## 2025-07-29 LAB
A-A DO2: ABNORMAL
ALBUMIN SERPL-MCNC: 4.7 G/DL (ref 3.5–5.2)
ALBUMIN/GLOB SERPL: 1.7 G/DL
ALP SERPL-CCNC: 51 U/L (ref 39–117)
ALT SERPL W P-5'-P-CCNC: 12 U/L (ref 1–33)
AMPHET+METHAMPHET UR QL: NEGATIVE
AMPHETAMINES UR QL: NEGATIVE
ANION GAP SERPL CALCULATED.3IONS-SCNC: 14 MMOL/L (ref 5–15)
ANION GAP SERPL CALCULATED.3IONS-SCNC: 15 MMOL/L (ref 5–15)
AST SERPL-CCNC: 18 U/L (ref 1–32)
ATMOSPHERIC PRESS: 756 MMHG
BARBITURATES UR QL SCN: NEGATIVE
BASE EXCESS BLDV CALC-SCNC: -3.1 MMOL/L (ref 0–2)
BASOPHILS # BLD AUTO: 0.06 10*3/MM3 (ref 0–0.2)
BASOPHILS NFR BLD AUTO: 0.3 % (ref 0–1.5)
BDY SITE: ABNORMAL
BENZODIAZ UR QL SCN: NEGATIVE
BILIRUB SERPL-MCNC: 0.3 MG/DL (ref 0–1.2)
BILIRUB UR QL STRIP: NEGATIVE
BODY TEMPERATURE: 37
BUN SERPL-MCNC: 10.5 MG/DL (ref 6–20)
BUN SERPL-MCNC: 8.8 MG/DL (ref 6–20)
BUN/CREAT SERPL: 16.3 (ref 7–25)
BUN/CREAT SERPL: 18.8 (ref 7–25)
BUPRENORPHINE SERPL-MCNC: NEGATIVE NG/ML
CA-I BLD-MCNC: ABNORMAL MG/DL
CALCIUM SPEC-SCNC: 8.8 MG/DL (ref 8.6–10.5)
CALCIUM SPEC-SCNC: 9.6 MG/DL (ref 8.6–10.5)
CANNABINOIDS SERPL QL: POSITIVE
CHLORIDE SERPL-SCNC: 104 MMOL/L (ref 98–107)
CHLORIDE SERPL-SCNC: 106 MMOL/L (ref 98–107)
CLARITY UR: CLEAR
CO2 SERPL-SCNC: 21 MMOL/L (ref 22–29)
CO2 SERPL-SCNC: 21 MMOL/L (ref 22–29)
COCAINE UR QL: NEGATIVE
COHGB MFR BLD: 2.8 % (ref 0–5)
COLOR UR: YELLOW
CPAP: ABNORMAL
CREAT SERPL-MCNC: 0.54 MG/DL (ref 0.57–1)
CREAT SERPL-MCNC: 0.56 MG/DL (ref 0.57–1)
CRP SERPL-MCNC: <0.3 MG/DL (ref 0–0.5)
D-LACTATE SERPL-SCNC: 1.7 MMOL/L (ref 0.5–2)
D-LACTATE SERPL-SCNC: 2.2 MMOL/L (ref 0.5–2)
D-LACTATE SERPL-SCNC: 2.8 MMOL/L (ref 0.5–2)
DEPRECATED RDW RBC AUTO: 44.1 FL (ref 37–54)
EGFRCR SERPLBLD CKD-EPI 2021: 132.5 ML/MIN/1.73
EGFRCR SERPLBLD CKD-EPI 2021: 133.7 ML/MIN/1.73
EOSINOPHIL # BLD AUTO: 0.21 10*3/MM3 (ref 0–0.4)
EOSINOPHIL NFR BLD AUTO: 0.9 % (ref 0.3–6.2)
EPAP: ABNORMAL
ERYTHROCYTE [DISTWIDTH] IN BLOOD BY AUTOMATED COUNT: 14.8 % (ref 12.3–15.4)
FENTANYL UR-MCNC: NEGATIVE NG/ML
GAS FLOW AIRWAY: ABNORMAL L/MIN
GLOBULIN UR ELPH-MCNC: 2.8 GM/DL
GLUCOSE BLDA-MCNC: 183 MG/DL (ref 65–99)
GLUCOSE SERPL-MCNC: 189 MG/DL (ref 65–99)
GLUCOSE SERPL-MCNC: 195 MG/DL (ref 65–99)
GLUCOSE UR STRIP-MCNC: ABNORMAL MG/DL
HCG INTACT+B SERPL-ACNC: 0.1 MIU/ML
HCO3 BLDV-SCNC: 23.4 MMOL/L (ref 22–28)
HCT VFR BLD AUTO: 37.4 % (ref 34–46.6)
HCT VFR BLD CALC: 35.1 % (ref 38–51)
HGB BLD-MCNC: 12 G/DL (ref 12–15.9)
HGB BLDA-MCNC: 11.4 G/DL (ref 12–16)
HGB UR QL STRIP.AUTO: NEGATIVE
IMM GRANULOCYTES # BLD AUTO: 0.1 10*3/MM3 (ref 0–0.05)
IMM GRANULOCYTES NFR BLD AUTO: 0.4 % (ref 0–0.5)
INHALED O2 CONCENTRATION: ABNORMAL %
INR PPP: 1.18 (ref 0.91–1.09)
IPAP: ABNORMAL
KETONES UR QL STRIP: NEGATIVE
LACTATE BLDA-SCNC: 2.6 MMOL/L (ref 0.5–2)
LDH SERPL-CCNC: 221 U/L (ref 135–214)
LEUKOCYTE ESTERASE UR QL STRIP.AUTO: NEGATIVE
LYMPHOCYTES # BLD AUTO: 3.89 10*3/MM3 (ref 0.7–3.1)
LYMPHOCYTES NFR BLD AUTO: 16.5 % (ref 19.6–45.3)
Lab: ABNORMAL
Lab: ABNORMAL
MAGNESIUM SERPL-MCNC: 1.9 MG/DL (ref 1.6–2.6)
MCH RBC QN AUTO: 26.3 PG (ref 26.6–33)
MCHC RBC AUTO-ENTMCNC: 32.1 G/DL (ref 31.5–35.7)
MCV RBC AUTO: 82 FL (ref 79–97)
METHADONE UR QL SCN: NEGATIVE
METHGB BLD QL: 0.4 % (ref 0–3)
MODALITY: ABNORMAL
MONOCYTES # BLD AUTO: 0.98 10*3/MM3 (ref 0.1–0.9)
MONOCYTES NFR BLD AUTO: 4.2 % (ref 5–12)
NEUTROPHILS NFR BLD AUTO: 18.27 10*3/MM3 (ref 1.7–7)
NEUTROPHILS NFR BLD AUTO: 77.7 % (ref 42.7–76)
NITRIC OXIDE: ABNORMAL
NITRITE UR QL STRIP: NEGATIVE
NOTE: ABNORMAL
NOTIFIED BY: ABNORMAL
NOTIFIED WHO: ABNORMAL
NRBC BLD AUTO-RTO: 0 /100 WBC (ref 0–0.2)
OPIATES UR QL: NEGATIVE
OXYCODONE UR QL SCN: NEGATIVE
OXYHGB MFR BLDV: 84.7 % (ref 60–85)
PAW @ PEAK INSP FLOW SETTING VENT: ABNORMAL CM[H2O]
PCO2 BLDV: 47 MM HG (ref 41–51)
PCP UR QL SCN: NEGATIVE
PEEP RESPIRATORY: ABNORMAL CM[H2O]
PH BLDV: 7.31 PH UNITS (ref 7.32–7.42)
PH UR STRIP.AUTO: 7.5 [PH] (ref 5–8)
PLATELET # BLD AUTO: 327 10*3/MM3 (ref 140–450)
PMV BLD AUTO: 11 FL (ref 6–12)
PO2 BLDV: 55.1 MM HG (ref 27–53)
POTASSIUM BLDV-SCNC: 3.2 MMOL/L (ref 3.5–5.2)
POTASSIUM SERPL-SCNC: 3.3 MMOL/L (ref 3.5–5.2)
POTASSIUM SERPL-SCNC: 3.7 MMOL/L (ref 3.5–5.2)
PROCALCITONIN SERPL-MCNC: 0.03 NG/ML (ref 0–0.25)
PROT SERPL-MCNC: 7.5 G/DL (ref 6–8.5)
PROT UR QL STRIP: ABNORMAL
PROTHROMBIN TIME: 15.6 SECONDS (ref 11.8–14.8)
PSV: ABNORMAL
PULSE OX: ABNORMAL
RBC # BLD AUTO: 4.56 10*6/MM3 (ref 3.77–5.28)
SAO2 % BLDCOV: 87.5 % (ref 45–75)
SET MECH RESP RATE: ABNORMAL
SODIUM BLDV-SCNC: 143 MMOL/L (ref 136–145)
SODIUM SERPL-SCNC: 139 MMOL/L (ref 136–145)
SODIUM SERPL-SCNC: 142 MMOL/L (ref 136–145)
SP GR UR STRIP: >1.03 (ref 1–1.03)
TOTAL RATE: ABNORMAL
TRICYCLICS UR QL SCN: NEGATIVE
UROBILINOGEN UR QL STRIP: ABNORMAL
VENTILATOR MODE: ABNORMAL
VOLUME % O2: ABNORMAL
VT ON VENT VENT: ABNORMAL ML
WBC NRBC COR # BLD AUTO: 23.51 10*3/MM3 (ref 3.4–10.8)

## 2025-07-29 PROCEDURE — 25010000002 ONDANSETRON PER 1 MG: Performed by: NURSE PRACTITIONER

## 2025-07-29 PROCEDURE — 74177 CT ABD & PELVIS W/CONTRAST: CPT

## 2025-07-29 PROCEDURE — 87147 CULTURE TYPE IMMUNOLOGIC: CPT | Performed by: EMERGENCY MEDICINE

## 2025-07-29 PROCEDURE — 85025 COMPLETE CBC W/AUTO DIFF WBC: CPT | Performed by: EMERGENCY MEDICINE

## 2025-07-29 PROCEDURE — 76856 US EXAM PELVIC COMPLETE: CPT

## 2025-07-29 PROCEDURE — 83605 ASSAY OF LACTIC ACID: CPT | Performed by: EMERGENCY MEDICINE

## 2025-07-29 PROCEDURE — 83605 ASSAY OF LACTIC ACID: CPT

## 2025-07-29 PROCEDURE — 25010000002 POTASSIUM CHLORIDE 10 MEQ/100ML SOLUTION: Performed by: EMERGENCY MEDICINE

## 2025-07-29 PROCEDURE — 81003 URINALYSIS AUTO W/O SCOPE: CPT | Performed by: NURSE PRACTITIONER

## 2025-07-29 PROCEDURE — 82330 ASSAY OF CALCIUM: CPT

## 2025-07-29 PROCEDURE — 84295 ASSAY OF SERUM SODIUM: CPT

## 2025-07-29 PROCEDURE — 36415 COLL VENOUS BLD VENIPUNCTURE: CPT

## 2025-07-29 PROCEDURE — 36415 COLL VENOUS BLD VENIPUNCTURE: CPT | Performed by: EMERGENCY MEDICINE

## 2025-07-29 PROCEDURE — 87154 CUL TYP ID BLD PTHGN 6+ TRGT: CPT | Performed by: EMERGENCY MEDICINE

## 2025-07-29 PROCEDURE — 80307 DRUG TEST PRSMV CHEM ANLYZR: CPT | Performed by: EMERGENCY MEDICINE

## 2025-07-29 PROCEDURE — 82947 ASSAY GLUCOSE BLOOD QUANT: CPT

## 2025-07-29 PROCEDURE — 25810000003 LACTATED RINGERS SOLUTION: Performed by: EMERGENCY MEDICINE

## 2025-07-29 PROCEDURE — 25510000001 IOPAMIDOL 61 % SOLUTION: Performed by: EMERGENCY MEDICINE

## 2025-07-29 PROCEDURE — 84702 CHORIONIC GONADOTROPIN TEST: CPT | Performed by: EMERGENCY MEDICINE

## 2025-07-29 PROCEDURE — 82805 BLOOD GASES W/O2 SATURATION: CPT

## 2025-07-29 PROCEDURE — 83735 ASSAY OF MAGNESIUM: CPT | Performed by: EMERGENCY MEDICINE

## 2025-07-29 PROCEDURE — 84145 PROCALCITONIN (PCT): CPT | Performed by: EMERGENCY MEDICINE

## 2025-07-29 PROCEDURE — 25010000002 ONDANSETRON PER 1 MG: Performed by: EMERGENCY MEDICINE

## 2025-07-29 PROCEDURE — 85610 PROTHROMBIN TIME: CPT | Performed by: EMERGENCY MEDICINE

## 2025-07-29 PROCEDURE — 25810000003 SODIUM CHLORIDE 0.9 % SOLUTION: Performed by: EMERGENCY MEDICINE

## 2025-07-29 PROCEDURE — 83615 LACTATE (LD) (LDH) ENZYME: CPT | Performed by: NURSE PRACTITIONER

## 2025-07-29 PROCEDURE — 84132 ASSAY OF SERUM POTASSIUM: CPT

## 2025-07-29 PROCEDURE — 25010000002 PROCHLORPERAZINE 10 MG/2ML SOLUTION: Performed by: NURSE PRACTITIONER

## 2025-07-29 PROCEDURE — 80053 COMPREHEN METABOLIC PANEL: CPT | Performed by: EMERGENCY MEDICINE

## 2025-07-29 PROCEDURE — 25010000002 FAMOTIDINE 10 MG/ML SOLUTION: Performed by: EMERGENCY MEDICINE

## 2025-07-29 PROCEDURE — 99285 EMERGENCY DEPT VISIT HI MDM: CPT | Performed by: EMERGENCY MEDICINE

## 2025-07-29 PROCEDURE — 86140 C-REACTIVE PROTEIN: CPT | Performed by: EMERGENCY MEDICINE

## 2025-07-29 PROCEDURE — 25810000003 LACTATED RINGERS PER 1000 ML: Performed by: FAMILY MEDICINE

## 2025-07-29 PROCEDURE — 85018 HEMOGLOBIN: CPT

## 2025-07-29 PROCEDURE — 76705 ECHO EXAM OF ABDOMEN: CPT

## 2025-07-29 PROCEDURE — 87040 BLOOD CULTURE FOR BACTERIA: CPT | Performed by: EMERGENCY MEDICINE

## 2025-07-29 RX ORDER — ACETAMINOPHEN 160 MG/5ML
650 SOLUTION ORAL EVERY 4 HOURS PRN
Status: DISCONTINUED | OUTPATIENT
Start: 2025-07-29 | End: 2025-08-01 | Stop reason: HOSPADM

## 2025-07-29 RX ORDER — FAMOTIDINE 10 MG/ML
20 INJECTION, SOLUTION INTRAVENOUS ONCE
Status: COMPLETED | OUTPATIENT
Start: 2025-07-29 | End: 2025-07-29

## 2025-07-29 RX ORDER — ACETAMINOPHEN 325 MG/1
650 TABLET ORAL EVERY 4 HOURS PRN
Status: DISCONTINUED | OUTPATIENT
Start: 2025-07-29 | End: 2025-08-01 | Stop reason: HOSPADM

## 2025-07-29 RX ORDER — ONDANSETRON 4 MG/1
4 TABLET, ORALLY DISINTEGRATING ORAL EVERY 6 HOURS PRN
Status: DISCONTINUED | OUTPATIENT
Start: 2025-07-29 | End: 2025-08-01 | Stop reason: HOSPADM

## 2025-07-29 RX ORDER — SODIUM CHLORIDE, SODIUM LACTATE, POTASSIUM CHLORIDE, CALCIUM CHLORIDE 600; 310; 30; 20 MG/100ML; MG/100ML; MG/100ML; MG/100ML
125 INJECTION, SOLUTION INTRAVENOUS CONTINUOUS
Status: DISPENSED | OUTPATIENT
Start: 2025-07-29 | End: 2025-07-29

## 2025-07-29 RX ORDER — FAMOTIDINE 20 MG/1
20 TABLET, FILM COATED ORAL 2 TIMES DAILY
Qty: 14 TABLET | Refills: 0 | Status: SHIPPED | OUTPATIENT
Start: 2025-07-29

## 2025-07-29 RX ORDER — ONDANSETRON 2 MG/ML
4 INJECTION INTRAMUSCULAR; INTRAVENOUS ONCE
Status: COMPLETED | OUTPATIENT
Start: 2025-07-29 | End: 2025-07-29

## 2025-07-29 RX ORDER — PROCHLORPERAZINE EDISYLATE 5 MG/ML
5 INJECTION INTRAMUSCULAR; INTRAVENOUS EVERY 6 HOURS PRN
Status: DISCONTINUED | OUTPATIENT
Start: 2025-07-29 | End: 2025-08-01 | Stop reason: HOSPADM

## 2025-07-29 RX ORDER — POTASSIUM CHLORIDE 7.45 MG/ML
10 INJECTION INTRAVENOUS ONCE
Status: COMPLETED | OUTPATIENT
Start: 2025-07-29 | End: 2025-07-29

## 2025-07-29 RX ORDER — IOPAMIDOL 612 MG/ML
100 INJECTION, SOLUTION INTRAVASCULAR
Status: COMPLETED | OUTPATIENT
Start: 2025-07-29 | End: 2025-07-29

## 2025-07-29 RX ORDER — ONDANSETRON 2 MG/ML
4 INJECTION INTRAMUSCULAR; INTRAVENOUS EVERY 6 HOURS PRN
Status: DISCONTINUED | OUTPATIENT
Start: 2025-07-29 | End: 2025-08-01 | Stop reason: HOSPADM

## 2025-07-29 RX ORDER — ONDANSETRON 8 MG/1
8 TABLET, ORALLY DISINTEGRATING ORAL EVERY 8 HOURS PRN
Qty: 15 TABLET | Refills: 0 | Status: SHIPPED | OUTPATIENT
Start: 2025-07-29

## 2025-07-29 RX ORDER — SODIUM CHLORIDE 0.9 % (FLUSH) 0.9 %
10 SYRINGE (ML) INJECTION EVERY 12 HOURS SCHEDULED
Status: DISCONTINUED | OUTPATIENT
Start: 2025-07-29 | End: 2025-08-01 | Stop reason: HOSPADM

## 2025-07-29 RX ORDER — SODIUM CHLORIDE 9 MG/ML
40 INJECTION, SOLUTION INTRAVENOUS AS NEEDED
Status: DISCONTINUED | OUTPATIENT
Start: 2025-07-29 | End: 2025-08-01 | Stop reason: HOSPADM

## 2025-07-29 RX ORDER — SODIUM CHLORIDE 0.9 % (FLUSH) 0.9 %
10 SYRINGE (ML) INJECTION AS NEEDED
Status: DISCONTINUED | OUTPATIENT
Start: 2025-07-29 | End: 2025-08-01 | Stop reason: HOSPADM

## 2025-07-29 RX ORDER — ALUMINA, MAGNESIA, AND SIMETHICONE 2400; 2400; 240 MG/30ML; MG/30ML; MG/30ML
15 SUSPENSION ORAL EVERY 6 HOURS PRN
Status: DISCONTINUED | OUTPATIENT
Start: 2025-07-29 | End: 2025-08-01 | Stop reason: HOSPADM

## 2025-07-29 RX ORDER — ACETAMINOPHEN 650 MG/1
650 SUPPOSITORY RECTAL EVERY 4 HOURS PRN
Status: DISCONTINUED | OUTPATIENT
Start: 2025-07-29 | End: 2025-08-01 | Stop reason: HOSPADM

## 2025-07-29 RX ADMIN — SODIUM CHLORIDE, POTASSIUM CHLORIDE, SODIUM LACTATE AND CALCIUM CHLORIDE 1000 ML: 600; 310; 30; 20 INJECTION, SOLUTION INTRAVENOUS at 06:18

## 2025-07-29 RX ADMIN — SODIUM CHLORIDE, SODIUM LACTATE, POTASSIUM CHLORIDE, CALCIUM CHLORIDE 125 ML/HR: 20; 30; 600; 310 INJECTION, SOLUTION INTRAVENOUS at 15:01

## 2025-07-29 RX ADMIN — Medication 10 ML: at 15:03

## 2025-07-29 RX ADMIN — PROCHLORPERAZINE EDISYLATE 5 MG: 5 INJECTION, SOLUTION INTRAMUSCULAR; INTRAVENOUS at 21:12

## 2025-07-29 RX ADMIN — SODIUM CHLORIDE, SODIUM LACTATE, POTASSIUM CHLORIDE, CALCIUM CHLORIDE 125 ML/HR: 20; 30; 600; 310 INJECTION, SOLUTION INTRAVENOUS at 10:15

## 2025-07-29 RX ADMIN — PROCHLORPERAZINE EDISYLATE 5 MG: 5 INJECTION, SOLUTION INTRAMUSCULAR; INTRAVENOUS at 15:03

## 2025-07-29 RX ADMIN — ONDANSETRON 4 MG: 2 INJECTION INTRAMUSCULAR; INTRAVENOUS at 06:21

## 2025-07-29 RX ADMIN — FAMOTIDINE 20 MG: 10 INJECTION INTRAVENOUS at 06:21

## 2025-07-29 RX ADMIN — IOPAMIDOL 100 ML: 612 INJECTION, SOLUTION INTRAVENOUS at 07:17

## 2025-07-29 RX ADMIN — SODIUM CHLORIDE 1000 ML: 9 INJECTION, SOLUTION INTRAVENOUS at 07:56

## 2025-07-29 RX ADMIN — ONDANSETRON 4 MG: 2 INJECTION INTRAMUSCULAR; INTRAVENOUS at 13:36

## 2025-07-29 RX ADMIN — POTASSIUM CHLORIDE 10 MEQ: 7.46 INJECTION, SOLUTION INTRAVENOUS at 09:00

## 2025-07-29 NOTE — ED PROVIDER NOTES
Subjective   History of Present Illness  20-year-old female presents to the ED with complaint of nausea, vomiting, diarrhea.  She reports sudden onset of symptoms approximately 2 hours return to the ED.  She has had numerous episodes of nonbloody nonbilious emesis, 2 through episodes of nonbloody nonblack and watery diarrhea.  No associated fever or chills.  Does complain of mild abdominal discomfort.  No dysuria, hematuria, frequency or urgency.  No cough, sore throat, runny.  She rates her symptoms as moderate to severe with no aggravating relieving factors.    History provided by:  Patient      Review of Systems   All other systems reviewed and are negative.      Past Medical History:   Diagnosis Date    ADHD     Allergic     Anxiety     Asthma     Depression     History of transfusion     Hyperemesis gravidarum        Allergies   Allergen Reactions    Blackberry [Rubus Fruticosus] Rash    Blue Dyes (Parenteral) Rash       Past Surgical History:   Procedure Laterality Date    D & C WITH SUCTION      DILATATION AND CURETTAGE N/A 2023    Procedure: DILATATION AND CURETTAGE;  Surgeon: Darryl Del Valle MD;  Location: [facility];  Service: Obstetrics/Gynecology;  Laterality: N/A;    FOOT SURGERY         Family History   Problem Relation Age of Onset    Heart attack Father     Diabetes Father     Alcohol abuse Father     Arthritis Mother     Depression Mother     Dementia Mother     Hypertension Mother     Hypertension Maternal Grandmother     Diabetes Maternal Grandfather     Hypertension Maternal Grandfather     Heart disease Maternal Grandfather     Breast cancer Neg Hx     Ovarian cancer Neg Hx     Uterine cancer Neg Hx     Colon cancer Neg Hx     Melanoma Neg Hx        Social History     Socioeconomic History    Marital status: Single   Tobacco Use    Smoking status: Former     Current packs/day: 0.00     Types: Cigarettes     Quit date: 2020     Years since quittin.4    Smokeless tobacco:  Never   Vaping Use    Vaping status: Every Day    Substances: Nicotine, THC    Devices: Disposable   Substance and Sexual Activity    Alcohol use: No    Drug use: Yes     Types: Marijuana     Comment: last use 9/8/24    Sexual activity: Yes     Partners: Male     Birth control/protection: I.U.D.           Objective   Physical Exam  Vitals and nursing note reviewed.   Constitutional:       Comments: Generally weak and mildly ill-appearing young adult female, appears uncomfortable but does not appear to be in distress   HENT:      Head: Normocephalic and atraumatic.      Nose: Nose normal. No congestion or rhinorrhea.      Mouth/Throat:      Mouth: Mucous membranes are moist.   Eyes:      Conjunctiva/sclera: Conjunctivae normal.      Pupils: Pupils are equal, round, and reactive to light.   Cardiovascular:      Rate and Rhythm: Normal rate and regular rhythm.      Heart sounds: Normal heart sounds. No murmur heard.  Pulmonary:      Effort: Pulmonary effort is normal.      Breath sounds: Normal breath sounds. No wheezing, rhonchi or rales.   Abdominal:      General: Abdomen is flat. Bowel sounds are normal.      Palpations: Abdomen is soft.   Musculoskeletal:      Right lower leg: No edema.      Left lower leg: No edema.   Skin:     General: Skin is warm and dry.      Capillary Refill: Capillary refill takes less than 2 seconds.         Procedures         Lab Results (last 24 hours)       Procedure Component Value Units Date/Time    Potassium [[id number]]  (Normal) Collected: 07/31/25 1639    Specimen: Blood Updated: 07/31/25 1725     Potassium 3.8 mmol/L     Magnesium [[id number]]  (Normal) Collected: 08/01/25 0555    Specimen: Blood Updated: 08/01/25 0639     Magnesium 2.0 mg/dL     Basic Metabolic Panel [[id number]]  (Abnormal) Collected: 08/01/25 0555    Specimen: Blood Updated: 08/01/25 0639     Glucose 88 mg/dL      BUN 5.7 mg/dL      Creatinine 0.47 mg/dL      Sodium 136 mmol/L      Potassium 4.2 mmol/L       Chloride 99 mmol/L      CO2 24.0 mmol/L      Calcium 9.3 mg/dL      BUN/Creatinine Ratio 12.1     Anion Gap 13.0 mmol/L      eGFR 138.2 mL/min/1.73     Narrative:      GFR Categories in Chronic Kidney Disease (CKD)              GFR Category          GFR (mL/min/1.73)    Interpretation  G1                    90 or greater        Normal or high (1)  G2                    60-89                Mild decrease (1)  G3a                   45-59                Mild to moderate decrease  G3b                   30-44                Moderate to severe decrease  G4                    15-29                Severe decrease  G5                    14 or less           Kidney failure    (1)In the absence of evidence of kidney disease, neither GFR category G1 or G2 fulfill the criteria for CKD.    eGFR calculation 2021 CKD-EPI creatinine equation, which does not include race as a factor    CBC & Differential [[id number]]  (Abnormal) Collected: 08/01/25 0555    Specimen: Blood Updated: 08/01/25 0611    Narrative:      The following orders were created for panel order CBC & Differential.  Procedure                               Abnormality         Status                     ---------                               -----------         ------                     CBC Auto Differential[[id number]]        Abnormal            Final result                 Please view results for these tests on the individual orders.    CBC Auto Differential [[id number]]  (Abnormal) Collected: 08/01/25 0555    Specimen: Blood Updated: 08/01/25 0611     WBC 9.50 10*3/mm3      RBC 4.66 10*6/mm3      Hemoglobin 12.3 g/dL      Hematocrit 37.6 %      MCV 80.7 fL      MCH 26.4 pg      MCHC 32.7 g/dL      RDW 14.2 %      RDW-SD 41.2 fl      MPV 10.1 fL      Platelets 291 10*3/mm3      Neutrophil % 69.8 %      Lymphocyte % 22.8 %      Monocyte % 6.1 %      Eosinophil % 0.5 %      Basophil % 0.3 %      Immature Grans % 0.5 %      Neutrophils, Absolute 6.62 10*3/mm3       Lymphocytes, Absolute 2.17 10*3/mm3      Monocytes, Absolute 0.58 10*3/mm3      Eosinophils, Absolute 0.05 10*3/mm3      Basophils, Absolute 0.03 10*3/mm3      Immature Grans, Absolute 0.05 10*3/mm3      nRBC 0.0 /100 WBC          No Radiology Exams Resulted Within Past 24 Hours   ED Course  ED Course as of 08/01/25 1007   Tue Jul 29, 2025 0702 20-year-old female presents to the ED with nausea, vomiting, diarrhea, abdominal pain.  White blood cell count 23,000.  Had mild diffuse abdominal tenderness on exam.  CT scan ordered and pending.  Signing over patient care to Dr. Fernando who will follow-up on imaging and disposition accordingly. [AW]   0731 Patient was seen by Dr. Siegel please refer to his care for the details the patient came into the ED with nausea and vomiting lab workup initiated venous blood gas can be obtained IV fluids given the patient.  We are waiting on a CT scan to be performed. [TS]   0939 Patient was similar to Michelle please refer to his records for further details came in with complaints of vomiting.  On abdominal examination she has diffuse tenderness there is no localized guarding or rebound tenderness.  She appears pale and appears sick.  Blood sugar is elevated with bicarb at 21 anion gap of 14.  Therefore VBG's were obtained which showed a pH 7.37 she is slightly acidemic but does not have any anion gap acidosis.  After IV fluids the CO2 has remained low.  Her white cell count 23 but her CRP and Pro-Zay is negative.  Will discuss this case with the hospitalist service.  CT scan shows enteritis/colitis [TS]   0947 This lady has multiple different issues going on she is having some nausea vomiting had some diarrhea earlier no diarrhea right blood sugar is elevated does not have any history of diabetes as far as we know.  This does not appear to be DKA with negative anion gap but she does have acidosis her CRP is negative her lactic acid is in the process of being collected.  And a  magnesium is normal.  Her Pro-Zay is negative so the possibility of bacterial infection is less likely.  But with a central colitis and VBG which is abnormal Liposyn will be to put her in the hospital with IV fluids and repeat lab workup and diagnostics. [TS]      ED Course User Index  [AW] Javier Siegel MD  [TS] Bakari Fernando MD                                                       Medical Decision Making  Problems Addressed:  Acidosis: complicated acute illness or injury  Dehydration: complicated acute illness or injury  Enterocolitis: complicated acute illness or injury  Gastroenteritis: complicated acute illness or injury  Hyperglycemia: complicated acute illness or injury    Amount and/or Complexity of Data Reviewed  Labs: ordered.  Radiology: ordered.    Risk  Prescription drug management.  Decision regarding hospitalization.        Final diagnoses:   Gastroenteritis   Enterocolitis   Dehydration   Hyperglycemia   Acidosis       ED Disposition  ED Disposition       ED Disposition   Decision to Admit    Condition   --    Comment   Level of Care: Med/Surg [1]   Diagnosis: Enterocolitis [552195]   Admitting Physician: MARIUM JACKSON [[id number]]   Attending Physician: MARIUM JACKSON [[id number]]   Certification: I Certify That Inpatient Hospital Services Are Medically Necessary For Greater Than 2 Midnights                 No follow-up provider specified.       Medication List        New Prescriptions      famotidine 20 MG tablet  Commonly known as: PEPCID  Take 1 tablet by mouth 2 (Two) Times a Day.     ondansetron ODT 8 MG disintegrating tablet  Commonly known as: ZOFRAN-ODT  Place 1 tablet on the tongue Every 8 (Eight) Hours As Needed for Nausea.               Where to Get Your Medications        These medications were sent to [facility] - [place], [place] - [address] - [phone]  - [phone]   [address], [facility] [address]      Phone: [phone]   famotidine 20 MG  tablet  ondansetron ODT 8 MG disintegrating tablet            Javier Siegel MD  07/29/25 0725       Javier Siegel MD  08/01/25 1007

## 2025-07-29 NOTE — H&P
[facility] Medicine Services  HISTORY AND PHYSICAL    Date of Admission: 7/29/2025  Primary Care Physician: Shirin Nuno APRN    Subjective   Primary Historian: Patient    Chief Complaint: Sudden onset persistent nausea and vomiting with mild abdominal pain    History of Present Illness    Rosmery Padilla is a 20-year-old female who presented  to the ED with complaint of nausea, vomiting, diarrhea.  She reports sudden onset of symptoms approximately 2 hours prior to coming to the ED.  She has had numerous episodes of non-bloody, non-bilious emesis, 2 episodes of nonbloody, nonblack but watery diarrhea.  No associated fever or chills.  Does complain of mild abdominal discomfort.  No dysuria, hematuria, frequency or urgency.  No cough, sore throat, runny.  She rates her symptoms as moderate to severe with no aggravating relieving factors.    ED workup remarkable for UDS positive for THC, K+ 3.3, WBC 23.51 with left shift, lactate 2.6 increased to 2.8 even after IV bolus eventually decreased to 1.7.  Venous blood gas acidotic 7.306 along with elevated lactic acid indicating metabolic acidosis, , glucose 195 and 189 respectively, procalcitonin was negative as well as CRP less than 0.30..,  hCG was negative for pregnancy, UA had trace protein and positive for glucose 100 mg/dL.  Patient is not diabetic, anion gap was 16  .  Imaging in ED included CT abdomen pelvis with contrast: Indicated moderate diffuse thickening of the wall of the small and large bowel without significant dilatation or air-fluid levels possibly representing acute enterocolitis, moderate fullness of the uterus with prominent and tortuous pelvic veins around the uterus and adnexa, significantly dilated gonadal ovarian veins and a small free fluid in the pelvis may suggest pelvic congestion syndrome, fatty infiltration of the liver.    Vital signs are stable /82, HR 66, RR 18, SpO2 97% on room air,  afebrile 98.1 °F.  She is being admitted to Dr. Mensah with hospital medicine for further evaluation of intractable nausea and vomiting with leukocytosis and metabolic acidosis.     Review of Systems   Otherwise complete ROS reviewed and negative except as mentioned in the HPI.    Past Medical History:   Past Medical History:   Diagnosis Date    ADHD     Allergic     Anxiety     Asthma     Depression     History of transfusion     Hyperemesis gravidarum 2022     Past Surgical History:  Past Surgical History:   Procedure Laterality Date    D & C WITH SUCTION      DILATATION AND CURETTAGE N/A 07/01/2023    Procedure: DILATATION AND CURETTAGE;  Surgeon: Darryl Del Valle MD;  Location: [facility];  Service: Obstetrics/Gynecology;  Laterality: N/A;    FOOT SURGERY       Social History:  reports that she quit smoking about 5 years ago. Her smoking use included cigarettes. She has never used smokeless tobacco. She reports current drug use. Drug: Marijuana. She reports that she does not drink alcohol.    Family History: family history includes Alcohol abuse in her father; Arthritis in her mother; Dementia in her mother; Depression in her mother; Diabetes in her father and maternal grandfather; Heart attack in her father; Heart disease in her maternal grandfather; Hypertension in her maternal grandfather, maternal grandmother, and mother.       Allergies:  Allergies   Allergen Reactions    Blackberry [Rubus Fruticosus] Rash    Blue Dyes (Parenteral) Rash       Medications:  Prior to Admission medications    Medication Sig Start Date End Date Taking? Authorizing Provider   famotidine (PEPCID) 20 MG tablet Take 1 tablet by mouth 2 (Two) Times a Day. 7/29/25   Javier Siegel MD   HYDROcodone-acetaminophen (NORCO) 5-325 MG per tablet Take 1 tablet by mouth Every 6 (Six) Hours As Needed for Moderate Pain. 7/7/25   Yadira Arboleda APRN   Levonorgestrel (MIRENA) 20 MCG/DAY intrauterine device IUD To be inserted  "one time by prescriber. Route intrauterine. 11/7/24 7/7/25  Ana Hargrove CNM   ondansetron ODT (ZOFRAN-ODT) 8 MG disintegrating tablet Place 1 tablet on the tongue Every 8 (Eight) Hours As Needed for Nausea. 7/29/25   Javier Siegel MD   norelgestromin-ethinyl estradiol (ORTHO EVRA) 150-35 MCG/24HR Place 1 patch on the skin as directed by provider 1 (One) Time Per Week. 10/28/21 2/2/22  Joyce Patiño DO     I have utilized all available immediate resources to obtain, update, or review the patient's current medications (including all prescriptions, over-the-counter products, herbals, cannabis/cannabidiol products, and vitamin/mineral/dietary (nutritional) supplements).         Objective     Vital Signs: /63 (BP Location: Right arm, Patient Position: Lying)   Pulse 69   Temp 99.2 °F (37.3 °C) (Oral)   Resp 16   Ht 142.2 cm (56\")   Wt 40.8 kg (90 lb)   SpO2 95%   BMI 20.18 kg/m²   Physical Exam  Vitals and nursing note reviewed.   Constitutional:       General: She is in acute distress.      Appearance: Normal appearance. She is ill-appearing.   HENT:      Head: Normocephalic and atraumatic.      Nose: Nose normal.      Mouth/Throat:      Mouth: Mucous membranes are dry.   Eyes:      Extraocular Movements: Extraocular movements intact.      Pupils: Pupils are equal, round, and reactive to light.   Cardiovascular:      Rate and Rhythm: Normal rate and regular rhythm.      Pulses: Normal pulses.           Dorsalis pedis pulses are 2+ on the right side and 2+ on the left side.        Posterior tibial pulses are 2+ on the right side and 2+ on the left side.      Heart sounds: Normal heart sounds. No murmur heard.     No friction rub. No gallop.   Pulmonary:      Effort: Pulmonary effort is normal.      Breath sounds: Normal breath sounds.   Abdominal:      General: Bowel sounds are normal.      Palpations: Abdomen is soft.      Tenderness: There is abdominal tenderness. There is guarding. " There is no rebound.   Genitourinary:     Comments: Deferred  Musculoskeletal:         General: Normal range of motion.      Cervical back: Normal range of motion.   Feet:      Right foot:      Skin integrity: Skin integrity normal.      Left foot:      Skin integrity: Skin integrity normal.   Skin:     General: Skin is warm and dry.      Capillary Refill: Capillary refill takes 2 to 3 seconds.   Neurological:      General: No focal deficit present.      Mental Status: She is alert and oriented to person, place, and time. Mental status is at baseline.      lts Reviewed:  Lab Results (last 24 hours)       Procedure Component Value Units Date/Time    Basic Metabolic Panel [[id number]]  (Abnormal) Collected: 07/30/25 0517    Specimen: Blood Updated: 07/30/25 0602     Glucose 82 mg/dL      BUN 5.9 mg/dL      Creatinine 0.49 mg/dL      Sodium 139 mmol/L      Potassium 3.3 mmol/L      Chloride 104 mmol/L      CO2 22.0 mmol/L      Calcium 8.8 mg/dL      BUN/Creatinine Ratio 12.0     Anion Gap 13.0 mmol/L      eGFR 136.9 mL/min/1.73     Narrative:      GFR Categories in Chronic Kidney Disease (CKD)              GFR Category          GFR (mL/min/1.73)    Interpretation  G1                    90 or greater        Normal or high (1)  G2                    60-89                Mild decrease (1)  G3a                   45-59                Mild to moderate decrease  G3b                   30-44                Moderate to severe decrease  G4                    15-29                Severe decrease  G5                    14 or less           Kidney failure    (1)In the absence of evidence of kidney disease, neither GFR category G1 or G2 fulfill the criteria for CKD.    eGFR calculation 2021 CKD-EPI creatinine equation, which does not include race as a factor    CBC & Differential [[id number]]  (Abnormal) Collected: 07/30/25 0517    Specimen: Blood Updated: 07/30/25 0539    Narrative:      The following orders were created for panel  order CBC & Differential.  Procedure                               Abnormality         Status                     ---------                               -----------         ------                     CBC Auto Differential[[id number]]        Abnormal            Final result                 Please view results for these tests on the individual orders.    CBC Auto Differential [[id number]]  (Abnormal) Collected: 07/30/25 0517    Specimen: Blood Updated: 07/30/25 0539     WBC 13.40 10*3/mm3      RBC 4.08 10*6/mm3      Hemoglobin 10.8 g/dL      Hematocrit 32.6 %      MCV 79.9 fL      MCH 26.5 pg      MCHC 33.1 g/dL      RDW 14.8 %      RDW-SD 43.2 fl      MPV 10.9 fL      Platelets 263 10*3/mm3      Neutrophil % 73.9 %      Lymphocyte % 20.1 %      Monocyte % 5.5 %      Eosinophil % 0.1 %      Basophil % 0.2 %      Immature Grans % 0.2 %      Neutrophils, Absolute 9.90 10*3/mm3      Lymphocytes, Absolute 2.69 10*3/mm3      Monocytes, Absolute 0.74 10*3/mm3      Eosinophils, Absolute 0.01 10*3/mm3      Basophils, Absolute 0.03 10*3/mm3      Immature Grans, Absolute 0.03 10*3/mm3      nRBC 0.0 /100 WBC     STAT Lactic Acid, Reflex [[id number]]  (Normal) Collected: 07/29/25 1549    Specimen: Blood Updated: 07/29/25 1612     Lactate 1.7 mmol/L     Lactate Dehydrogenase [[id number]]  (Abnormal) Collected: 07/29/25 0755    Specimen: Blood from Arm, Right Updated: 07/29/25 1405      U/L     STAT Lactic Acid, Reflex [[id number]]  (Abnormal) Collected: 07/29/25 1246    Specimen: Blood Updated: 07/29/25 1321     Lactate 2.2 mmol/L     Fentanyl, Urine - Urine, Clean Catch [[id number]]  (Normal) Collected: 07/29/25 1006    Specimen: Urine, Clean Catch Updated: 07/29/25 1107     Fentanyl, Urine Negative    Narrative:      Negative Threshold:      Fentanyl 5 ng/mL     The normal value for the drug tested is negative. This report includes final unconfirmed screening results to be used for medical treatment purposes only. Unconfirmed  results must not be used for non-medical purposes such as employment or legal testing. Clinical consideration should be applied to any drug of abuse test, particularly when unconfirmed results are used.           Urine Drug Screen - Urine, Clean Catch [[id number]]  (Abnormal) Collected: 07/29/25 1006    Specimen: Urine, Clean Catch Updated: 07/29/25 1105     THC, Screen, Urine Positive     Phencyclidine (PCP), Urine Negative     Cocaine Screen, Urine Negative     Methamphetamine, Ur Negative     Opiate Screen Negative     Amphetamine Screen, Urine Negative     Benzodiazepine Screen, Urine Negative     Tricyclic Antidepressants Screen Negative     Methadone Screen, Urine Negative     Barbiturates Screen, Urine Negative     Oxycodone Screen, Urine Negative     Buprenorphine, Screen, Urine Negative    Narrative:      Cutoff For Drugs Screened:    Amphetamines               500 ng/ml  Barbiturates               200 ng/ml  Benzodiazepines            150 ng/ml  Cocaine                    150 ng/ml  Methadone                  200 ng/ml  Opiates                    100 ng/ml  Phencyclidine               25 ng/ml  THC                         50 ng/ml  Methamphetamine            500 ng/ml  Tricyclic Antidepressants  300 ng/ml  Oxycodone                  100 ng/ml  Buprenorphine               10 ng/ml    The normal value for all drugs tested is negative. This report includes unconfirmed screening results, with the cutoff values listed, to be used for medical treatment purposes only.  Unconfirmed results must not be used for non-medical purposes such as employment or legal testing.  Clinical consideration should be applied to any drug of abuse test, particularly when unconfirmed results are used.      Urinalysis With Culture If Indicated - Urine, Clean Catch [[id number]]  (Abnormal) Collected: 07/29/25 1007    Specimen: Urine, Clean Catch Updated: 07/29/25 1016     Color, UA Yellow     Appearance, UA Clear     pH, UA 7.5      "Specific Gravity, UA >1.030     Glucose,  mg/dL (Trace)     Ketones, UA Negative     Bilirubin, UA Negative     Blood, UA Negative     Protein, UA Trace     Leuk Esterase, UA Negative     Nitrite, UA Negative     Urobilinogen, UA 1.0 E.U./dL    Narrative:      In absence of clinical symptoms, the presence of pyuria, bacteria, and/or nitrites on the urinalysis result does not correlate with infection.  Urine microscopic not indicated.    Lactic Acid, Plasma [[id number]]  (Abnormal) Collected: 07/29/25 0920    Specimen: Blood Updated: 07/29/25 0950     Lactate 2.8 mmol/L     Blood Culture - Blood, Arm, Left [[id number]] Collected: 07/29/25 0920    Specimen: Blood from Arm, Left Updated: 07/29/25 0927    Procalcitonin [[id number]]  (Normal) Collected: 07/29/25 0755    Specimen: Blood from Arm, Right Updated: 07/29/25 0911     Procalcitonin 0.03 ng/mL     Narrative:      As a Marker for Sepsis (Non-Neonates):    1. <0.5 ng/mL represents a low risk of severe sepsis and/or septic shock.  2. >2 ng/mL represents a high risk of severe sepsis and/or septic shock.    As a Marker for Lower Respiratory Tract Infections that require antibiotic therapy:    PCT on Admission    Antibiotic Therapy       6-12 Hrs later    >0.5                Strongly Recommended  >0.25 - <0.5        Recommended   0.1 - 0.25          Discouraged              Remeasure/reassess PCT  <0.1                Strongly Discouraged     Remeasure/reassess PCT    As 28 day mortality risk marker: \"Change in Procalcitonin Result\" (>80% or <=80%) if Day 0 (or Day 1) and Day 4 values are available. Refer to http://www.[email or web address]-pct-calculator.com    Change in PCT <=80%  A decrease of PCT levels below or equal to 80% defines a positive change in PCT test result representing a higher risk for 28-day all-cause mortality of patients diagnosed with severe sepsis for septic shock.    Change in PCT >80%  A decrease of PCT levels of more than 80% defines a negative change in " PCT result representing a lower risk for 28-day all-cause mortality of patients diagnosed with severe sepsis or septic shock.       Blood Culture - Blood, Arm, Right [[id number]] Collected: 07/29/25 0853    Specimen: Blood from Arm, Right Updated: 07/29/25 0904    C-reactive Protein [[id number]]  (Normal) Collected: 07/29/25 0755    Specimen: Blood from Arm, Right Updated: 07/29/25 0842     C-Reactive Protein <0.30 mg/dL     Basic Metabolic Panel [[id number]]  (Abnormal) Collected: 07/29/25 0755    Specimen: Blood from Arm, Right Updated: 07/29/25 0826     Glucose 189 mg/dL      BUN 8.8 mg/dL      Creatinine 0.54 mg/dL      Sodium 139 mmol/L      Potassium 3.3 mmol/L      Chloride 104 mmol/L      CO2 21.0 mmol/L      Calcium 8.8 mg/dL      BUN/Creatinine Ratio 16.3     Anion Gap 14.0 mmol/L      eGFR 133.7 mL/min/1.73     Narrative:      GFR Categories in Chronic Kidney Disease (CKD)              GFR Category          GFR (mL/min/1.73)    Interpretation  G1                    90 or greater        Normal or high (1)  G2                    60-89                Mild decrease (1)  G3a                   45-59                Mild to moderate decrease  G3b                   30-44                Moderate to severe decrease  G4                    15-29                Severe decrease  G5                    14 or less           Kidney failure    (1)In the absence of evidence of kidney disease, neither GFR category G1 or G2 fulfill the criteria for CKD.    eGFR calculation 2021 CKD-EPI creatinine equation, which does not include race as a factor    Venous Blood Gas with Coox and Lactate [[id number]]  (Abnormal) Collected: 07/29/25 0809    Specimen: Venous Blood Updated: 07/29/25 0810     pH, Venous 7.306 pH Units      Comment: 84 Value below reference range        pCO2, Venous 47.0 mm Hg      pO2, Venous 55.1 mm Hg      Comment: 83 Value above reference range        HCO3, Venous 23.4 mmol/L      Base Excess, Venous -3.1 mmol/L       Comment: 84 Value below reference range        Hemoglobin, Blood Gas 11.4 g/dL      Comment: 84 Value below reference range        Hematocrit, Blood Gas 35.1 %      Comment: 84 Value below reference range        Oxyhemoglobin Venous 84.7 %      Methemoglobin Venous 0.4 %      Carboxyhemoglobin Venous 2.8 %      O2 Saturation, Venous 87.5 %      Comment: 83 Value above reference range        Ionized Calcium --     Sodium, Venous 143 mmol/L      Potassium, Venous 3.2 mmol/L      Comment: 84 Value below reference range        Glucose, Arterial 183 mg/dL      A-a DO2 --     Volume % O2 --     Temperature 37.0     Barometric Pressure for Blood Gas 756 mmHg      Site Nurse/Dr Draw     Modality Room Air     FIO2 --     Flow Rate --     Nitric Oxide --     Ventilator Mode NA     Set Tidal Volume --     Set Mech Resp Rate --     Rate --     PEEP --     PSV --     PIP --     IPAP --     EPAP --     CPAP --     Pulse Ox --     Notified Who DR VILLARREAL     Notified By Reed Quintero, CRT     Notified Time 07/29/2025 08:10     Collected by [id number]     Comment: Meter: [id number]     :  Reed Quintero CRT        Note --     Lactate, whole blood 2.6 mmol/L      Comment: 86 Value above critical limit             Imaging Results (Last 24 Hours)       Procedure Component Value Units Date/Time    US Pelvis Complete [[id number]] Collected: 07/29/25 1530     Updated: 07/29/25 1537    Narrative:      EXAMINATION: US PELVIS COMPLETE- 7/29/2025 3:30 PM     HISTORY: abnormal CT uterus and ovaries with vascular abnormalities and  free fluid in pelvis; K52.9-Noninfective gastroenteritis and colitis,  unspecified; K52.9-Noninfective gastroenteritis and colitis,  unspecified; E86.0-Dehydration; R73.9-Hyperglycemia, unspecified;  E87.20-Acidosis, unspecified.     Images are stored in PACS per institutional protocols and regulatory  requirements.     REPORT: Sonographic images of the pelvis were obtained transabdominally.  Images were  obtained in the transverse and longitudinal dimensions.     COMPARISON: CT abdomen pelvis 7/29/2025.     The technologist reports that the examination is technically difficult  due to the patient's constant motion, excessive bowel gas, nausea and  vomiting.     The uterus measures 7.5 x 3.8 x 4.9 cm and appears anteverted. There is  shadowing from the IUD within the endometrial cavity. No endometrial  thickening is identified.     The right ovary measures 3.5 x 2.4 x 2.1 cm and appears unremarkable.  Color and spectral Doppler images demonstrate arterial and venous blood  flow within the right ovary.     The left ovary measures 4.1 x 2.0 x 1.9 cm and appears normal. Color and  spectral Doppler images demonstrate arterial and venous blood flow  within the left ovary. No free fluid is identified.       Impression:      No acute sonographic pelvic abnormality, findings suggestive  of pelvic vascular congestion syndrome are better demonstrated on CT  today. Normal appearance of the uterus and ovaries, an IUD is present  within the uterus.     This report was signed and finalized on 7/29/2025 3:34 PM by Dr. Tyrone Quiroz MD.       US Abdomen Limited - In process [[id number]] Resulted: 07/29/25 1150     Updated: 07/29/25 1426    This result has not been signed. Information might be incomplete.            I have personally reviewed and interpreted the radiology studies and ECG obtained at time of admission.     Assessment / Plan   Assessment:   Active Hospital Problems    Diagnosis     **Enterocolitis     Metabolic acidosis     Intractable nausea and vomiting     Cannabinoid hyperemesis syndrome        Treatment Plan  The patient will be admitted to Dr. Mensah with hospital medicine  service here at [facility].   Enterocolitis  CT abdomen pelvis remarkable for diffuse thickening of the wall and mucosa of the entire small bowel with enhancement, moderate thickening of the wall of the colon patient  ED infused  1 L lactated Ringer's +1 L normal saline bolus  Lactated Ringer's infusion at 100 mL/h  Ultrasound of the abdomen and pelvis for further evaluation of abnormalities in the pelvis, to evaluate appendix and gallbladder as well as free fluid in the pelvis for etiology of sudden onset intractable nausea and vomiting: Reports pending  CBC and CMP repeat in the morning  Stool GI panel ordered, patient has not had a bowel movement to be collected since ordered per nurse report patient  Intractable nausea and vomiting  Zofran every 4 hours as needed  Compazine IV every 6 hours as needed, listed indication for cannabinoid hyperemesis treatment  Clear liquid diet  I/O are not being documented by nursing staff and EHR  Metabolic acidosis  Received 1 L LR bolus +1 L NS bolus, initial lactate 2.6> 2.8-> 1.7  Cannabinoid hyperemesis syndrome  Clear liquid diet  Alternate Zofran 4 mg every 4 hours as needed and Compazine IV 5 mg every 6 hours as needed for nausea and vomiting control  Lactated Ringer's infusion at 100 mL/h  Educated on abstinence of use of THC products    Medical Decision Making  Number and Complexity of problems: 4  4 acute problems, high complexity, unimproved, stable but guarded  Differential Diagnosis: C. difficile colitis, foodborne illness, cholecystitis, viral illness    Conditions and Status        Condition is unchanged.     [facility] Data  External documents reviewed: Yes yes  Cardiac tracing (EKG, telemetry) interpretation: Yes  Radiology interpretation: Diagnostic imaging reports reviewed  Labs reviewed: Previous and ongoing labs reviewed  Any tests that were considered but not ordered: Intravaginal ultrasound     Decision rules/scores evaluated (example [id number], Wells, etc): N/A     Discussed with: Patient and Dr. Mensah     Care Planning  Shared decision making: Patient and Dr. Mensah  Code status and discussions: Patient wishes to be full code    Disposition  Social Determinants of Health that  impact treatment or disposition: No  Estimated length of stay is 1-3 days.     I confirmed that the patient's advanced care plan is present, code status is documented, and a surrogate decision maker is listed in the patient's medical record.     The patient's surrogate decision maker is her significant other Jed Ivy.     The patient was seen and examined by me on 7/29/2025 at 10: 30 CDT.    Electronically signed by INDIA Najera, 07/29/25, 14:57 CDT.

## 2025-07-29 NOTE — PLAN OF CARE
Goal Outcome Evaluation:  Plan of Care Reviewed With: patient        Progress: no change     Pt a/o x4. Room air. Up stand by. IV fluids per order. Clear liquids as tolerated. Pt has n/v'ing- see mar. Pt c/o abd pain- MD aware of all symptoms. Pt c/o restless legs. Still need a stool sample. Voiding. PPP. VSS. DHILLON. Call light within reach. Safety maintained

## 2025-07-29 NOTE — ED PROVIDER NOTES
Subjective   History of Present Illness    Review of Systems    Past Medical History:   Diagnosis Date    ADHD     Allergic     Anxiety     Asthma     Depression     History of transfusion     Hyperemesis gravidarum        Allergies   Allergen Reactions    Blackberry [Rubus Fruticosus] Rash    Blue Dyes (Parenteral) Rash       Past Surgical History:   Procedure Laterality Date    D & C WITH SUCTION      DILATATION AND CURETTAGE N/A 2023    Procedure: DILATATION AND CURETTAGE;  Surgeon: Darryl Del Valle MD;  Location: [facility] OR;  Service: Obstetrics/Gynecology;  Laterality: N/A;    FOOT SURGERY         Family History   Problem Relation Age of Onset    Heart attack Father     Diabetes Father     Alcohol abuse Father     Arthritis Mother     Depression Mother     Dementia Mother     Hypertension Mother     Hypertension Maternal Grandmother     Diabetes Maternal Grandfather     Hypertension Maternal Grandfather     Heart disease Maternal Grandfather     Breast cancer Neg Hx     Ovarian cancer Neg Hx     Uterine cancer Neg Hx     Colon cancer Neg Hx     Melanoma Neg Hx        Social History     Socioeconomic History    Marital status: Single   Tobacco Use    Smoking status: Former     Current packs/day: 0.00     Types: Cigarettes     Quit date: 2020     Years since quittin.4    Smokeless tobacco: Never   Vaping Use    Vaping status: Every Day    Substances: Nicotine    Devices: Disposable   Substance and Sexual Activity    Alcohol use: No    Drug use: Yes     Types: Marijuana     Comment: last use 24    Sexual activity: Yes     Partners: Male     Birth control/protection: I.U.D.           Objective   Physical Exam    Procedures           ED Course  ED Course as of 25 0948      0702 20-year-old female presents to the ED with nausea, vomiting, diarrhea, abdominal pain.  White blood cell count 23,000.  Had mild diffuse abdominal tenderness on exam.  CT scan ordered and  pending.  Signing over patient care to Dr. Fernando who will follow-up on imaging and disposition accordingly. [AW]   0731 Patient was seen by Dr. Siegel please refer to his care for the details the patient came into the ED with nausea and vomiting lab workup initiated venous blood gas can be obtained IV fluids given the patient.  We are waiting on a CT scan to be performed. [TS]   0939 Patient was similar to Michelle please refer to his records for further details came in with complaints of vomiting.  On abdominal examination she has diffuse tenderness there is no localized guarding or rebound tenderness.  She appears pale and appears sick.  Blood sugar is elevated with bicarb at 21 anion gap of 14.  Therefore VBG's were obtained which showed a pH 7.37 she is slightly acidemic but does not have any anion gap acidosis.  After IV fluids the CO2 has remained low.  Her white cell count 23 but her CRP and Pro-Zay is negative.  Will discuss this case with the hospitalist service.  CT scan shows enteritis/colitis [TS]   0947 This lady has multiple different issues going on she is having some nausea vomiting had some diarrhea earlier no diarrhea right blood sugar is elevated does not have any history of diabetes as far as we know.  This does not appear to be DKA with negative anion gap but she does have acidosis her CRP is negative her lactic acid is in the process of being collected.  And a magnesium is normal.  Her Pro-Zay is negative so the possibility of bacterial infection is less likely.  But with a central colitis and VBG which is abnormal Liposyn will be to put her in the hospital with IV fluids and repeat lab workup and diagnostics. [TS]      ED Course User Index  [AW] Javier Siegel MD  [TS] Bakari Fernando MD                                                       Medical Decision Making  Problems Addressed:  Acidosis: complicated acute illness or injury  Dehydration: complicated acute illness or  injury  Enterocolitis: complicated acute illness or injury  Gastroenteritis: complicated acute illness or injury  Hyperglycemia: complicated acute illness or injury    Amount and/or Complexity of Data Reviewed  Labs: ordered.     Details: Labs reviewed  Radiology: ordered.     Details: CAT scan reviewed    Risk  Prescription drug management.  Decision regarding hospitalization.  Risk Details: Patient will be admitted for IV fluids IV hydration still vomiting        Final diagnoses:   Gastroenteritis   Enterocolitis   Dehydration   Hyperglycemia   Acidosis       ED Disposition  ED Disposition       ED Disposition   Decision to Admit    Condition   --    Comment   Level of Care: Med/Surg [1]   Diagnosis: Enterocolitis [987755]   Admitting Physician: MARIUM JACKSON [[id number]]   Attending Physician: MARIUM JACKSON [[id number]]   Certification: I Certify That Inpatient Hospital Services Are Medically Necessary For Greater Than 2 Midnights                 No follow-up provider specified.       Medication List        New Prescriptions      famotidine 20 MG tablet  Commonly known as: PEPCID  Take 1 tablet by mouth 2 (Two) Times a Day.     ondansetron ODT 8 MG disintegrating tablet  Commonly known as: ZOFRAN-ODT  Place 1 tablet on the tongue Every 8 (Eight) Hours As Needed for Nausea.               Where to Get Your Medications        These medications were sent to [facility] - [place] - [address] - [phone]  - [phone]  [address], [facility] [address]      Phone: [phone]   famotidine 20 MG tablet  ondansetron ODT 8 MG disintegrating tablet            Bakari Fernando MD  07/29/25 4834       Bakari Fernando MD  07/29/25 6090

## 2025-07-30 PROBLEM — R11.2 CANNABINOID HYPEREMESIS SYNDROME: Status: ACTIVE | Noted: 2025-07-30

## 2025-07-30 PROBLEM — R11.2 INTRACTABLE NAUSEA AND VOMITING: Status: ACTIVE | Noted: 2025-07-30

## 2025-07-30 PROBLEM — E87.20 METABOLIC ACIDOSIS: Status: ACTIVE | Noted: 2025-07-30

## 2025-07-30 PROBLEM — F12.90 CANNABINOID HYPEREMESIS SYNDROME: Status: ACTIVE | Noted: 2025-07-30

## 2025-07-30 LAB
ANION GAP SERPL CALCULATED.3IONS-SCNC: 13 MMOL/L (ref 5–15)
BACTERIA BLD CULT: ABNORMAL
BASOPHILS # BLD AUTO: 0.03 10*3/MM3 (ref 0–0.2)
BASOPHILS NFR BLD AUTO: 0.2 % (ref 0–1.5)
BOTTLE TYPE: ABNORMAL
BUN SERPL-MCNC: 5.9 MG/DL (ref 6–20)
BUN/CREAT SERPL: 12 (ref 7–25)
CALCIUM SPEC-SCNC: 8.8 MG/DL (ref 8.6–10.5)
CHLORIDE SERPL-SCNC: 104 MMOL/L (ref 98–107)
CO2 SERPL-SCNC: 22 MMOL/L (ref 22–29)
CREAT SERPL-MCNC: 0.49 MG/DL (ref 0.57–1)
DEPRECATED RDW RBC AUTO: 43.2 FL (ref 37–54)
EGFRCR SERPLBLD CKD-EPI 2021: 136.9 ML/MIN/1.73
EOSINOPHIL # BLD AUTO: 0.01 10*3/MM3 (ref 0–0.4)
EOSINOPHIL NFR BLD AUTO: 0.1 % (ref 0.3–6.2)
ERYTHROCYTE [DISTWIDTH] IN BLOOD BY AUTOMATED COUNT: 14.8 % (ref 12.3–15.4)
GLUCOSE SERPL-MCNC: 82 MG/DL (ref 65–99)
HCT VFR BLD AUTO: 32.6 % (ref 34–46.6)
HGB BLD-MCNC: 10.8 G/DL (ref 12–15.9)
IMM GRANULOCYTES # BLD AUTO: 0.03 10*3/MM3 (ref 0–0.05)
IMM GRANULOCYTES NFR BLD AUTO: 0.2 % (ref 0–0.5)
LYMPHOCYTES # BLD AUTO: 2.69 10*3/MM3 (ref 0.7–3.1)
LYMPHOCYTES NFR BLD AUTO: 20.1 % (ref 19.6–45.3)
MAGNESIUM SERPL-MCNC: 1.9 MG/DL (ref 1.6–2.6)
MCH RBC QN AUTO: 26.5 PG (ref 26.6–33)
MCHC RBC AUTO-ENTMCNC: 33.1 G/DL (ref 31.5–35.7)
MCV RBC AUTO: 79.9 FL (ref 79–97)
MONOCYTES # BLD AUTO: 0.74 10*3/MM3 (ref 0.1–0.9)
MONOCYTES NFR BLD AUTO: 5.5 % (ref 5–12)
NEUTROPHILS NFR BLD AUTO: 73.9 % (ref 42.7–76)
NEUTROPHILS NFR BLD AUTO: 9.9 10*3/MM3 (ref 1.7–7)
NRBC BLD AUTO-RTO: 0 /100 WBC (ref 0–0.2)
PLATELET # BLD AUTO: 263 10*3/MM3 (ref 140–450)
PMV BLD AUTO: 10.9 FL (ref 6–12)
POTASSIUM SERPL-SCNC: 3.3 MMOL/L (ref 3.5–5.2)
RBC # BLD AUTO: 4.08 10*6/MM3 (ref 3.77–5.28)
SODIUM SERPL-SCNC: 139 MMOL/L (ref 136–145)
WBC NRBC COR # BLD AUTO: 13.4 10*3/MM3 (ref 3.4–10.8)

## 2025-07-30 PROCEDURE — 85025 COMPLETE CBC W/AUTO DIFF WBC: CPT | Performed by: NURSE PRACTITIONER

## 2025-07-30 PROCEDURE — 25010000002 PROCHLORPERAZINE 10 MG/2ML SOLUTION: Performed by: NURSE PRACTITIONER

## 2025-07-30 PROCEDURE — 25010000002 CEFTRIAXONE PER 250 MG: Performed by: NURSE PRACTITIONER

## 2025-07-30 PROCEDURE — 87040 BLOOD CULTURE FOR BACTERIA: CPT | Performed by: FAMILY MEDICINE

## 2025-07-30 PROCEDURE — 25010000002 METOCLOPRAMIDE PER 10 MG: Performed by: FAMILY MEDICINE

## 2025-07-30 PROCEDURE — 80048 BASIC METABOLIC PNL TOTAL CA: CPT | Performed by: NURSE PRACTITIONER

## 2025-07-30 PROCEDURE — 25810000003 LACTATED RINGERS PER 1000 ML: Performed by: FAMILY MEDICINE

## 2025-07-30 PROCEDURE — 83735 ASSAY OF MAGNESIUM: CPT | Performed by: FAMILY MEDICINE

## 2025-07-30 PROCEDURE — 25010000002 ONDANSETRON PER 1 MG: Performed by: NURSE PRACTITIONER

## 2025-07-30 PROCEDURE — 25010000002 POTASSIUM CHLORIDE 10 MEQ/100ML SOLUTION: Performed by: FAMILY MEDICINE

## 2025-07-30 RX ORDER — POTASSIUM CHLORIDE 7.45 MG/ML
10 INJECTION INTRAVENOUS
Status: COMPLETED | OUTPATIENT
Start: 2025-07-30 | End: 2025-07-30

## 2025-07-30 RX ORDER — POTASSIUM CHLORIDE 1500 MG/1
40 TABLET, EXTENDED RELEASE ORAL ONCE
Status: DISCONTINUED | OUTPATIENT
Start: 2025-07-30 | End: 2025-07-30

## 2025-07-30 RX ORDER — METOCLOPRAMIDE 10 MG/1
10 TABLET ORAL
Status: DISCONTINUED | OUTPATIENT
Start: 2025-07-30 | End: 2025-07-30

## 2025-07-30 RX ORDER — METRONIDAZOLE 500 MG/100ML
500 INJECTION, SOLUTION INTRAVENOUS EVERY 8 HOURS
Status: DISCONTINUED | OUTPATIENT
Start: 2025-07-30 | End: 2025-07-30

## 2025-07-30 RX ORDER — METOCLOPRAMIDE HYDROCHLORIDE 5 MG/ML
10 INJECTION INTRAMUSCULAR; INTRAVENOUS EVERY 6 HOURS
Status: COMPLETED | OUTPATIENT
Start: 2025-07-30 | End: 2025-07-31

## 2025-07-30 RX ORDER — DEXTROSE MONOHYDRATE, SODIUM CHLORIDE, AND POTASSIUM CHLORIDE 50; 1.49; 4.5 G/1000ML; G/1000ML; G/1000ML
100 INJECTION, SOLUTION INTRAVENOUS CONTINUOUS
Status: DISCONTINUED | OUTPATIENT
Start: 2025-07-30 | End: 2025-07-31

## 2025-07-30 RX ORDER — METOCLOPRAMIDE HYDROCHLORIDE 5 MG/ML
10 INJECTION INTRAMUSCULAR; INTRAVENOUS EVERY 6 HOURS
Status: DISCONTINUED | OUTPATIENT
Start: 2025-07-30 | End: 2025-07-30 | Stop reason: SDUPTHER

## 2025-07-30 RX ORDER — SODIUM CHLORIDE, SODIUM LACTATE, POTASSIUM CHLORIDE, CALCIUM CHLORIDE 600; 310; 30; 20 MG/100ML; MG/100ML; MG/100ML; MG/100ML
75 INJECTION, SOLUTION INTRAVENOUS CONTINUOUS
Status: DISCONTINUED | OUTPATIENT
Start: 2025-07-30 | End: 2025-07-30

## 2025-07-30 RX ADMIN — Medication 10 ML: at 19:46

## 2025-07-30 RX ADMIN — PROCHLORPERAZINE EDISYLATE 5 MG: 5 INJECTION, SOLUTION INTRAMUSCULAR; INTRAVENOUS at 21:54

## 2025-07-30 RX ADMIN — ONDANSETRON 4 MG: 2 INJECTION INTRAMUSCULAR; INTRAVENOUS at 19:45

## 2025-07-30 RX ADMIN — ONDANSETRON 4 MG: 2 INJECTION INTRAMUSCULAR; INTRAVENOUS at 00:02

## 2025-07-30 RX ADMIN — METRONIDAZOLE 500 MG: 500 INJECTION, SOLUTION INTRAVENOUS at 09:10

## 2025-07-30 RX ADMIN — CEFTRIAXONE 2000 MG: 2 INJECTION, POWDER, FOR SOLUTION INTRAMUSCULAR; INTRAVENOUS at 09:10

## 2025-07-30 RX ADMIN — METOCLOPRAMIDE 10 MG: 5 INJECTION, SOLUTION INTRAMUSCULAR; INTRAVENOUS at 23:17

## 2025-07-30 RX ADMIN — DEXTROSE MONOHYDRATE, SODIUM CHLORIDE, AND POTASSIUM CHLORIDE 100 ML/HR: 50; 1.49; 4.5 INJECTION, SOLUTION INTRAVENOUS at 15:15

## 2025-07-30 RX ADMIN — SODIUM CHLORIDE, POTASSIUM CHLORIDE, SODIUM LACTATE AND CALCIUM CHLORIDE 75 ML/HR: 600; 310; 30; 20 INJECTION, SOLUTION INTRAVENOUS at 10:00

## 2025-07-30 RX ADMIN — POTASSIUM CHLORIDE 10 MEQ: 7.46 INJECTION, SOLUTION INTRAVENOUS at 17:15

## 2025-07-30 RX ADMIN — PROCHLORPERAZINE EDISYLATE 5 MG: 5 INJECTION, SOLUTION INTRAMUSCULAR; INTRAVENOUS at 09:20

## 2025-07-30 RX ADMIN — METOCLOPRAMIDE 10 MG: 5 INJECTION, SOLUTION INTRAMUSCULAR; INTRAVENOUS at 19:45

## 2025-07-30 RX ADMIN — POTASSIUM CHLORIDE 10 MEQ: 7.46 INJECTION, SOLUTION INTRAVENOUS at 15:18

## 2025-07-30 RX ADMIN — METOCLOPRAMIDE 10 MG: 10 TABLET ORAL at 12:13

## 2025-07-30 RX ADMIN — ONDANSETRON 4 MG: 2 INJECTION INTRAMUSCULAR; INTRAVENOUS at 06:12

## 2025-07-30 RX ADMIN — Medication 10 ML: at 09:11

## 2025-07-30 NOTE — PLAN OF CARE
Goal Outcome Evaluation:  Plan of Care Reviewed With: patient        Progress: no change  Outcome Evaluation: Pt reports she has been sick for the past few days with nausea,vomiting and diarrhea.  Pt reports she usually has a good appetite. Pt reports she usually eats three meals and snacks throughout the day. Pt with weight loss of 34 lbs over the past 10 months; pt reports she was pregnant in September. Pt report her UBW is in the 90's lbs weight range. Pt on GI restricted low irritant,soft to chew,whole meat,thin liquids. Pt was offered oral supplements but refused today. Will add Italian ice with meals. Con to follow for plan of care.

## 2025-07-30 NOTE — PROGRESS NOTES
[facility] Medicine Services  INPATIENT PROGRESS NOTE    Patient Name: Rosmery Padilla  Date of Admission: 7/29/2025  Today's Date: 07/30/25  Length of Stay: 1  Primary Care Physician: Shirin Nuno APRN    Subjective   Chief Complaint: Sudden onset persistent nausea and vomiting with mild abdominal pain   Today:  Patient had some episodes of vomiting early in the night however nursing staff alternated Zofran and Compazine IV to control vomiting, patient was able to rest per nursing report.  Her vitals remained stable until approximately 5:00 this morning, she became slightly tachycardic with  and hypotensive with a BP of 97/52, temperature 99.2 °F, remains on room air SPO2 90%.  She remains on clear liquid diet, pelvic ultrasound was fairly unremarkable however the abdomen ultrasound report is not posted and still pending.  Will start Flagyl 500 mg IV every 8 hours and Rocephin 2 g every 24 hours IV for treatment of intra-abdominal infection such as enterocolitis.  Patient has not had a bowel movement since she was admitted and we have not be able to collect a stool sample for GI panel.  According to her description, her bowel movements do not have the characteristics of the typical C. difficile colitis stool, denies foul odor, denies mucousy stools or bloody stools.  Will await starting vancomycin treatment to see if combination of Flagyl and Rocephin improve her symptoms.  Her white blood cell count came down today from 23.51-13.40 and her left shift has resolved without the use of antibiotics.  Continue LR IV infusion at 100 mL/h until she is able to tolerate oral intake, remains on clear liquid diet, creatinine stable this morning 0.49, K+ low again 3.3 likely due to excessive vomiting.  Will continue to monitor and watch for final report for abdominal ultrasound, will recheck vitals every 4 hours and repeat labs in the morning or sooner if status changes.       Review of Systems   All pertinent negatives and positives are as above. All other systems have been reviewed and are negative unless otherwise stated.     Objective    Temp:  [98 °F (36.7 °C)-99.2 °F (37.3 °C)] 99.2 °F (37.3 °C)  Heart Rate:  [] 69  Resp:  [16-20] 16  BP: ()/(48-95) 122/63  Physical Exam  Vitals and nursing note reviewed.   Constitutional:       General: She is in acute distress.      Appearance: Normal appearance. She is ill-appearing.   HENT:      Head: Normocephalic and atraumatic.      Nose: Nose normal.      Mouth/Throat:      Mouth: Mucous membranes are dry.   Eyes:      Extraocular Movements: Extraocular movements intact.      Pupils: Pupils are equal, round, and reactive to light.   Cardiovascular:      Rate and Rhythm: Normal rate and regular rhythm.      Pulses: Normal pulses.           Dorsalis pedis pulses are 2+ on the right side and 2+ on the left side.        Posterior tibial pulses are 2+ on the right side and 2+ on the left side.      Heart sounds: Normal heart sounds. No murmur heard.     No friction rub. No gallop.   Pulmonary:      Effort: Pulmonary effort is normal.      Breath sounds: Normal breath sounds.   Abdominal:      General: Bowel sounds are normal.      Palpations: Abdomen is soft.      Tenderness: There is abdominal tenderness. There is guarding. There is no rebound.   Genitourinary:     Comments: Deferred  Musculoskeletal:         General: Normal range of motion.      Cervical back: Normal range of motion.   Feet:      Right foot:      Skin integrity: Skin integrity normal.      Left foot:      Skin integrity: Skin integrity normal.   Skin:     General: Skin is warm and dry.      Capillary Refill: Capillary refill takes 2 to 3 seconds.   Neurological:      General: No focal deficit present.      Mental Status: She is alert and oriented to person, place, and time. Mental status is at baseline.      Results Review:  I have reviewed the labs, radiology  "results, and diagnostic studies.    Laboratory Data:   Results from last 7 days   Lab Units 07/30/25  0517 07/29/25  0618   WBC 10*3/mm3 13.40* 23.51*   HEMOGLOBIN g/dL 10.8* 12.0   HEMATOCRIT % 32.6* 37.4   PLATELETS 10*3/mm3 263 327        Results from last 7 days   Lab Units 07/30/25  0517 07/29/25  0809 07/29/25  0755 07/29/25  0618   SODIUM mmol/L 139  --  139 142   SODIUM, VENOUS mmol/L  --  143  --   --    POTASSIUM mmol/L 3.3*  --  3.3* 3.7   POTASSIUM, VENOUS mmol/L  --  3.2*  --   --    CHLORIDE mmol/L 104  --  104 106   CO2 mmol/L 22.0  --  21.0* 21.0*   BUN mg/dL 5.9*  --  8.8 10.5   CREATININE mg/dL 0.49*  --  0.54* 0.56*   CALCIUM mg/dL 8.8  --  8.8 9.6   BILIRUBIN mg/dL  --   --   --  0.3   ALK PHOS U/L  --   --   --  51   ALT (SGPT) U/L  --   --   --  12   AST (SGOT) U/L  --   --   --  18   GLUCOSE mg/dL 82  --  189* 195*   GLUCOSE, ARTERIAL mg/dL  --  183*  --   --        Culture Data:   No results found for: \"BLOODCX\", \"URINECX\", \"WOUNDCX\", \"MRSACX\", \"RESPCX\", \"STOOLCX\"    Radiology Data:   Imaging Results (Last 24 Hours)       Procedure Component Value Units Date/Time    US Abdomen Limited [[id number]] Collected: 07/30/25 0802     Updated: 07/30/25 0807    Narrative:      EXAMINATION: US ABDOMEN LIMITED- 7/30/2025 8:02 AM     HISTORY: abdominal pain, n/v/d, wbc 23,000; poorly visualized appendix,  CT nonspecific; K52.9-Noninfective gastroenteritis and colitis,  unspecified; K52.9-Noninfective gastroenteritis and colitis,  unspecified; E86.0-Dehydration; R73.9-Hyperglycemia, unspecified;  E87.20-Acidosis, unspecified.     Images are stored in PACS per institutional protocols and regulatory  requirements.     REPORT: Targeted ultrasound of the right lower quadrant was performed in  the region of pain.     COMPARISON: CT abdomen pelvis 7/29/2025, ultrasound pelvis 7/29/2025.     No mass or fluid collection is identified, the appendix is not clearly  demonstrated. There are some fluid-filled bowel " loops in this region.       Impression:      Nonvisualization of the appendix. No acute sonographic  abnormality.     This report was signed and finalized on 7/30/2025 8:04 AM by Dr. Tyrone Quiroz MD.       US Pelvis Complete [[id number]] Collected: 07/29/25 1530     Updated: 07/29/25 1537    Narrative:      EXAMINATION: US PELVIS COMPLETE- 7/29/2025 3:30 PM     HISTORY: abnormal CT uterus and ovaries with vascular abnormalities and  free fluid in pelvis; K52.9-Noninfective gastroenteritis and colitis,  unspecified; K52.9-Noninfective gastroenteritis and colitis,  unspecified; E86.0-Dehydration; R73.9-Hyperglycemia, unspecified;  E87.20-Acidosis, unspecified.     Images are stored in PACS per institutional protocols and regulatory  requirements.     REPORT: Sonographic images of the pelvis were obtained transabdominally.  Images were obtained in the transverse and longitudinal dimensions.     COMPARISON: CT abdomen pelvis 7/29/2025.     The technologist reports that the examination is technically difficult  due to the patient's constant motion, excessive bowel gas, nausea and  vomiting.     The uterus measures 7.5 x 3.8 x 4.9 cm and appears anteverted. There is  shadowing from the IUD within the endometrial cavity. No endometrial  thickening is identified.     The right ovary measures 3.5 x 2.4 x 2.1 cm and appears unremarkable.  Color and spectral Doppler images demonstrate arterial and venous blood  flow within the right ovary.     The left ovary measures 4.1 x 2.0 x 1.9 cm and appears normal. Color and  spectral Doppler images demonstrate arterial and venous blood flow  within the left ovary. No free fluid is identified.       Impression:      No acute sonographic pelvic abnormality, findings suggestive  of pelvic vascular congestion syndrome are better demonstrated on CT  today. Normal appearance of the uterus and ovaries, an IUD is present  within the uterus.     This report was signed and finalized on  7/29/2025 3:34 PM by Dr. Tyrone Quiroz MD.               I have reviewed the patient's current medications.     Assessment/Plan   Assessment  Active Hospital Problems    Diagnosis     **Enterocolitis     Metabolic acidosis     Intractable nausea and vomiting     Cannabinoid hyperemesis syndrome        Treatment Plan  The patient will be admitted to Dr. Mensah with hospital medicine  service here at [facility].   Enterocolitis  CT abdomen pelvis remarkable for diffuse thickening of the wall and mucosa of the entire small bowel with enhancement, moderate thickening of the wall of the colon patient  ED infused 1 L lactated Ringer's +1 L normal saline bolus  Lactated Ringer's infusion at 100 mL/h  Ultrasound of the abdomen and pelvis for further evaluation of abnormalities in the pelvis, to evaluate appendix and gallbladder as well as free fluid in the pelvis for etiology of sudden onset intractable nausea and vomiting: Pelvis ultrasound unremarkable, abdomen ultrasound ordered as limited, right lower quadrant examined only, still was unable to visualize appendix or evaluate free fluid in the pelvis, only noted air-fluid levels/bowel loops.  CBC and CMP repeat in the morning  Stool GI panel ordered, patient has not had a bowel movement to be collected since ordered per nurse report patient  Intractable nausea and vomiting  Zofran every 4 hours as needed  Compazine IV every 6 hours as needed, listed indication for cannabinoid hyperemesis treatment  Clear liquid diet  I/O are not being documented by nursing staff and EHR  Metabolic acidosis  Received 1 L LR bolus +1 L NS bolus, initial lactate 2.6> 2.8-> 1.7  Cannabinoid hyperemesis syndrome  Clear liquid diet  Alternate Zofran 4 mg every 4 hours as needed and Compazine IV 5 mg every 6 hours as needed for nausea and vomiting control  Lactated Ringer's infusion at 100 mL/h  Educated on abstinence of use of THC products     Medical Decision Making  Number  and Complexity of problems: 4  4 acute problems, high complexity, unimproved, stable but guarded  Differential Diagnosis: C. difficile colitis, foodborne illness, cholecystitis, viral illness     Conditions and Status        Condition is slightly improved, vomiting has improved continues to have ongoing nausea.     [facility] Data  External documents reviewed: Yes yes  Cardiac tracing (EKG, telemetry) interpretation: Yes  Radiology interpretation: Diagnostic imaging reports reviewed  Labs reviewed: Previous and ongoing labs reviewed  Any tests that were considered but not ordered: Intravaginal ultrasound     Decision rules/scores evaluated (example [id number], Wells, etc): N/A     Discussed with: Patient and Dr. Mensah     Care Planning  Shared decision making: Patient and Dr. Mensah  Code status and discussions: Patient wishes to be full code     Disposition  Social Determinants of Health that impact treatment or disposition: No  Estimated length of stay is 2-3 days.            Electronically signed by ALEJANDRA Najera, 07/30/25, 08:16 CDT.

## 2025-07-30 NOTE — PROGRESS NOTES
"Nutrition Services    Patient Name:  Rosmery Padilla  YOB: 2005  MRN: [id number]  Admit Date:  7/29/2025    Patient Name: Rosmery Padilla  YOB: 2005  MRN: [id number]  Admission date: 7/29/2025  Reason for Encounter: MST 2-3 or Nursing Admission Screen    [facility] Clinical Nutrition Assessment     Subjective    Subjective Information     Pt reports she has been sick for the past few days with nausea,vomiting and diarrhea.  Pt reports she usually has a good appetite. Pt reports she usually eats three meals and snacks throughout the day. Pt with weight loss of 34 lbs over the past 10 months; pt reports she was pregnant in September. Pt report her UBW is in the 90's lbs weight range. Pt on GI restricted low irritant,soft to chew,whole meat,thin liquids. Pt was offered oral supplements but refused today. Will add Italian ice with meals.      Objective   H&P and Current Problems      H&P  Past Medical History:   Diagnosis Date    ADHD     Allergic     Anxiety     Asthma     Depression     History of transfusion     Hyperemesis gravidarum 2022      Past Surgical History:   Procedure Laterality Date    D & C WITH SUCTION      DILATATION AND CURETTAGE N/A 07/01/2023    Procedure: DILATATION AND CURETTAGE;  Surgeon: Darryl Del Valle MD;  Location: [facility];  Service: Obstetrics/Gynecology;  Laterality: N/A;    FOOT SURGERY        Current Problems   Admission Diagnosis:  Enterocolitis [K52.9]    Problem List:    Enterocolitis    Metabolic acidosis    Intractable nausea and vomiting    Cannabinoid hyperemesis syndrome     Applicable Nutrition Hx      Anthropometrics       Height: 142.2 cm (56\")  Weight: 40.8 kg (90 lb) (07/29/25 1348)     BMI (Calculated): 20.2       Trending Weight Changes 07/30/25: weight loss of 34 lbs, however pt was pregnant over the past 10 months       Weight History   Weight report reviewed       Labs      Comment:      Results from last 7 days   Lab Units " 07/30/25  0517 07/29/25  1549 07/29/25  1246 07/29/25  0920 07/29/25  0809 07/29/25  0755 07/29/25  0618   SODIUM mmol/L 139  --   --   --   --  139 142   SODIUM, VENOUS mmol/L  --   --   --   --  143  --   --    POTASSIUM mmol/L 3.3*  --   --   --   --  3.3* 3.7   POTASSIUM, VENOUS mmol/L  --   --   --   --  3.2*  --   --    GLUCOSE mg/dL 82  --   --   --   --  189* 195*   GLUCOSE, ARTERIAL mg/dL  --   --   --   --  183*  --   --    BUN mg/dL 5.9*  --   --   --   --  8.8 10.5   CREATININE mg/dL 0.49*  --   --   --   --  0.54* 0.56*   CALCIUM mg/dL 8.8  --   --   --   --  8.8 9.6   MAGNESIUM mg/dL 1.9  --   --   --   --   --  1.9   ALBUMIN g/dL  --   --   --   --   --   --  4.7   CRP mg/dL  --   --   --   --   --  <0.30  --    LACTATE mmol/L  --  1.7 2.2* 2.8* 2.6*  --   --    BILIRUBIN mg/dL  --   --   --   --   --   --  0.3   ALK PHOS U/L  --   --   --   --   --   --  51   AST (SGOT) U/L  --   --   --   --   --   --  18   ALT (SGPT) U/L  --   --   --   --   --   --  12     Results from last 7 days   Lab Units 07/30/25  0517 07/29/25  0618   PLATELETS 10*3/mm3 263 327   HEMOGLOBIN g/dL 10.8* 12.0   HEMATOCRIT % 32.6* 37.4     Lab Results   Component Value Date    HGBA1C 5.50 01/17/2020          Medications       Scheduled Medications cefTRIAXone, 2,000 mg, Intravenous, Q24H  metoclopramide, 10 mg, Oral, TID AC  metroNIDAZOLE, 500 mg, Intravenous, Q8H  potassium chloride, 40 mEq, Oral, Once  sodium chloride, 10 mL, Intravenous, Q12H        Infusions lactated ringers, 75 mL/hr, Last Rate: 75 mL/hr (07/30/25 1000)        PRN Medications   acetaminophen **OR** acetaminophen **OR** acetaminophen    aluminum-magnesium hydroxide-simethicone    Calcium Replacement - Follow Nurse / BPA Driven Protocol    Magnesium Low Dose Replacement - Follow Nurse / BPA Driven Protocol    ondansetron ODT **OR** ondansetron    Phosphorus Replacement - Follow Nurse / BPA Driven Protocol    Potassium Replacement - Follow Nurse / BPA Driven  Protocol    prochlorperazine    [COMPLETED] Insert Peripheral IV **AND** sodium chloride    sodium chloride    sodium chloride     Physical Findings      Chewing/Swallowing    No issues identified at this time   Dentition Mouth/Teeth WDL: .WDL except, teeth   Teeth Symptoms: tooth/teeth missing     Skin      No skin breakdown     Bowel function Last Bowel Movement: 07/29/25 (07/2003)            Stool Consistency: loose (07/29/25 1524)     Edema         Intake & Output (last 3 days)         07/27 0701 07/28 0700 07/28 0701 07/29 0700 07/29 0701 07/30 0700 07/30 0701 07/31 0700    P.O.    240    Total Intake(mL/kg)    240 (5.9)    Net    +240            Urine Unmeasured Occurrence   1 x              Nutrition Focused Physical Exam     07/30/25: NFPE not completed r/t pt not feeling good.     Estimated Needs       Date Assessed 7/30/25    Weight(s) Used  90 lbs       Energy Requirements    Method for Estimation  30-35 kcals/kg   Daily Needs (kcal/day) 0139-5813   Protein Requirements    Method for Estimation 1.2 gm/kg   Daily Needs (g/day) 48.9   Fluid Requirements     Method for Estimation 1 mL/kcal    Daily Needs (mL/day) 2709-2718     Current Nutrition Orders & Evaluation of Intake      Oral Nutrition     Food Allergies/Intolerances NKFA   Current PO Diet Diet: Gastrointestinal; Fiber-Restricted, Low Irritant; Texture: Soft to Chew (NDD 3); Soft to Chew: Whole Meat; Fluid Consistency: Thin (IDDSI 0)   Oral Nutrition Supplement None    Trending % PO Intake 07/30/25: insufficient     Enteral Nutrition     Current EN Order Patient isn't on Tube Feeding    Current EN Modulars None    EN Route     EN Tolerance     EN Observation/Intake         Parenteral Nutrition     Current TPN Order    TPN Route    Lipids (mL/%/frequency)     Total # Days on TPN    TPN Observation/Intake       Assessment & Plan   Nutrition Diagnosis and Goals       Nutrition Diagnosis 1 Predicted Inadequate Energy Intake related to  enterocolitis and cannabinoid hyperemesis syndrome as evidence by report of poor oral intake over the past few days per pt.      Nutrition Diagnosis 2 None         Goal(s) Establish PO Intake     Nutrition Intervention and Prescription       Intervention  Oral nutrition supplement offered but declined by patient      Diet Prescription     Supplement Prescription     Education Provided       Enteral Prescription        TPN Prescription      Monitoring/Evaluation       Monitor/Evaluation Per Protocol     RD Follow-Up Encounter 3-5 days     Electronically signed by:  Janel Rooney RDN, OSBALDO  07/30/25 14:13 CDT      Electronically signed by:  Janel Rooney RDN, OSBALDO  07/30/25 14:13 CDT

## 2025-07-30 NOTE — PLAN OF CARE
Goal Outcome Evaluation:  Plan of Care Reviewed With: patient        Progress: no change  Outcome Evaluation: VSS. A&Ox4. Up SBA d/t reports of weakness. C/o N/V, relief w/ prns and rest. IVFI. RA. SCDs. Resting comfortably. Safety maintained.

## 2025-07-30 NOTE — PLAN OF CARE
Goal Outcome Evaluation: Pt is alert and oriented, vitals are stable, pt cont to have nausea and vomiting, room air, indep in room, IV is clean dry intact, pt has showered today

## 2025-07-30 NOTE — PAYOR COMM NOTE
"ADMIT INPT 7-29-25  UR  [phone]    Richard Aranda (20 y.o. Female)       Date of Birth   2005    Social Security Number       Address   [address] [facility] [facility] [address]    Home Phone   [phone]    MRN   [id number]       Restoration   Anabaptism    Marital Status   Single                            Admission Date   7/29/2025    Admission Type   Emergency    Admitting Provider   Con Mensah MD    Attending Provider   Con Mensah MD    Department, Room/Bed   [facility] 3A, 338/1       Discharge Date       Discharge Disposition       Discharge Destination                                 Attending Provider: Con Mensah MD    Allergies: Blackberry [Rubus Fruticosus], Blue Dyes (Parenteral)    Isolation: None   Infection: None   Code Status: CPR    Ht: 142.2 cm (56\")   Wt: 40.8 kg (90 lb)    Admission Cmt: None   Principal Problem: Enterocolitis [K52.9]                   Active Insurance as of 7/29/2025       Primary Coverage       Payor Plan Insurance Group Employer/Plan Group    [employer] [place] [employer] [place]ICA[place]        Payor Plan Address Payor Plan Phone Number Payor Plan Fax Number Effective Dates    [address] [phone]  9/5/2018 - None Entered    [facility] [address]         Subscriber Name Subscriber Birth Date Member ID       RICHARD ARANDA 2005 [id number]                     Emergency Contacts        (Rel.) Home Phone Work Phone Mobile Phone    JENN ZIMMERMAN (Significant Other) [phone]                 History & Physical        Reba Smith APRN at 07/29/25 0949       Attestation signed by Con Mensah MD at 07/30/25 0926      I performed a substantive part of the MDM during the patient’s E/M visit. I personally made or   approved the documented management plan and acknowledge its risk of complications.     Electronically signed by Con Mensah MD, 7/30/2025, 09:26 CDT.               "             [facility] Medicine Services  HISTORY AND PHYSICAL    Date of Admission: 7/29/2025  Primary Care Physician: Shirin Nuno APRN    Subjective   Primary Historian: Patient    Chief Complaint: Sudden onset persistent nausea and vomiting with mild abdominal pain    History of Present Illness    Rosmery Padilla is a 20-year-old female who presented  to the ED with complaint of nausea, vomiting, diarrhea.  She reports sudden onset of symptoms approximately 2 hours prior to coming to the ED.  She has had numerous episodes of non-bloody, non-bilious emesis, 2 episodes of nonbloody, nonblack but watery diarrhea.  No associated fever or chills.  Does complain of mild abdominal discomfort.  No dysuria, hematuria, frequency or urgency.  No cough, sore throat, runny.  She rates her symptoms as moderate to severe with no aggravating relieving factors.    ED workup remarkable for UDS positive for THC, K+ 3.3, WBC 23.51 with left shift, lactate 2.6 increased to 2.8 even after IV bolus eventually decreased to 1.7.  Venous blood gas acidotic 7.306 along with elevated lactic acid indicating metabolic acidosis, , glucose 195 and 189 respectively, procalcitonin was negative as well as CRP less than 0.30..,  hCG was negative for pregnancy, UA had trace protein and positive for glucose 100 mg/dL.  Patient is not diabetic, anion gap was 16  .  Imaging in ED included CT abdomen pelvis with contrast: Indicated moderate diffuse thickening of the wall of the small and large bowel without significant dilatation or air-fluid levels possibly representing acute enterocolitis, moderate fullness of the uterus with prominent and tortuous pelvic veins around the uterus and adnexa, significantly dilated gonadal ovarian veins and a small free fluid in the pelvis may suggest pelvic congestion syndrome, fatty infiltration of the liver.    Vital signs are stable /82, HR 66, RR 18, SpO2 97% on room  air, afebrile 98.1 °F.  She is being admitted to Dr. Mensah with hospital medicine for further evaluation of intractable nausea and vomiting with leukocytosis and metabolic acidosis.     Review of Systems   Otherwise complete ROS reviewed and negative except as mentioned in the HPI.    Past Medical History:   Past Medical History:   Diagnosis Date    ADHD     Allergic     Anxiety     Asthma     Depression     History of transfusion     Hyperemesis gravidarum 2022     Past Surgical History:  Past Surgical History:   Procedure Laterality Date    D & C WITH SUCTION      DILATATION AND CURETTAGE N/A 07/01/2023    Procedure: DILATATION AND CURETTAGE;  Surgeon: Darryl Del Valle MD;  Location: [facility];  Service: Obstetrics/Gynecology;  Laterality: N/A;    FOOT SURGERY       Social History:  reports that she quit smoking about 5 years ago. Her smoking use included cigarettes. She has never used smokeless tobacco. She reports current drug use. Drug: Marijuana. She reports that she does not drink alcohol.    Family History: family history includes Alcohol abuse in her father; Arthritis in her mother; Dementia in her mother; Depression in her mother; Diabetes in her father and maternal grandfather; Heart attack in her father; Heart disease in her maternal grandfather; Hypertension in her maternal grandfather, maternal grandmother, and mother.       Allergies:  Allergies   Allergen Reactions    Blackberry [Rubus Fruticosus] Rash    Blue Dyes (Parenteral) Rash       Medications:  Prior to Admission medications    Medication Sig Start Date End Date Taking? Authorizing Provider   famotidine (PEPCID) 20 MG tablet Take 1 tablet by mouth 2 (Two) Times a Day. 7/29/25   Javier Siegel MD   HYDROcodone-acetaminophen (NORCO) 5-325 MG per tablet Take 1 tablet by mouth Every 6 (Six) Hours As Needed for Moderate Pain. 7/7/25   Yadira Arboleda APRN   Levonorgestrel (MIRENA) 20 MCG/DAY intrauterine device IUD To be  "inserted one time by prescriber. Route intrauterine. 11/7/24 7/7/25  Beena AnaCOLLEEN bartlett   ondansetron ODT (ZOFRAN-ODT) 8 MG disintegrating tablet Place 1 tablet on the tongue Every 8 (Eight) Hours As Needed for Nausea. 7/29/25   Javier Siegel MD   norelgestromin-ethinyl estradiol (ORTHO EVRA) 150-35 MCG/24HR Place 1 patch on the skin as directed by provider 1 (One) Time Per Week. 10/28/21 2/2/22  Joyce Patiño DO     I have utilized all available immediate resources to obtain, update, or review the patient's current medications (including all prescriptions, over-the-counter products, herbals, cannabis/cannabidiol products, and vitamin/mineral/dietary (nutritional) supplements).         Objective     Vital Signs: /63 (BP Location: Right arm, Patient Position: Lying)   Pulse 69   Temp 99.2 °F (37.3 °C) (Oral)   Resp 16   Ht 142.2 cm (56\")   Wt 40.8 kg (90 lb)   SpO2 95%   BMI 20.18 kg/m²   Physical Exam  Vitals and nursing note reviewed.   Constitutional:       General: She is in acute distress.      Appearance: Normal appearance. She is ill-appearing.   HENT:      Head: Normocephalic and atraumatic.      Nose: Nose normal.      Mouth/Throat:      Mouth: Mucous membranes are dry.   Eyes:      Extraocular Movements: Extraocular movements intact.      Pupils: Pupils are equal, round, and reactive to light.   Cardiovascular:      Rate and Rhythm: Normal rate and regular rhythm.      Pulses: Normal pulses.           Dorsalis pedis pulses are 2+ on the right side and 2+ on the left side.        Posterior tibial pulses are 2+ on the right side and 2+ on the left side.      Heart sounds: Normal heart sounds. No murmur heard.     No friction rub. No gallop.   Pulmonary:      Effort: Pulmonary effort is normal.      Breath sounds: Normal breath sounds.   Abdominal:      General: Bowel sounds are normal.      Palpations: Abdomen is soft.      Tenderness: There is abdominal tenderness. There is " guarding. There is no rebound.   Genitourinary:     Comments: Deferred  Musculoskeletal:         General: Normal range of motion.      Cervical back: Normal range of motion.   Feet:      Right foot:      Skin integrity: Skin integrity normal.      Left foot:      Skin integrity: Skin integrity normal.   Skin:     General: Skin is warm and dry.      Capillary Refill: Capillary refill takes 2 to 3 seconds.   Neurological:      General: No focal deficit present.      Mental Status: She is alert and oriented to person, place, and time. Mental status is at baseline.      lts Reviewed:  Lab Results (last 24 hours)       Procedure Component Value Units Date/Time    Basic Metabolic Panel [[id number]]  (Abnormal) Collected: 07/30/25 0517    Specimen: Blood Updated: 07/30/25 0602     Glucose 82 mg/dL      BUN 5.9 mg/dL      Creatinine 0.49 mg/dL      Sodium 139 mmol/L      Potassium 3.3 mmol/L      Chloride 104 mmol/L      CO2 22.0 mmol/L      Calcium 8.8 mg/dL      BUN/Creatinine Ratio 12.0     Anion Gap 13.0 mmol/L      eGFR 136.9 mL/min/1.73     Narrative:      GFR Categories in Chronic Kidney Disease (CKD)              GFR Category          GFR (mL/min/1.73)    Interpretation  G1                    90 or greater        Normal or high (1)  G2                    60-89                Mild decrease (1)  G3a                   45-59                Mild to moderate decrease  G3b                   30-44                Moderate to severe decrease  G4                    15-29                Severe decrease  G5                    14 or less           Kidney failure    (1)In the absence of evidence of kidney disease, neither GFR category G1 or G2 fulfill the criteria for CKD.    eGFR calculation 2021 CKD-EPI creatinine equation, which does not include race as a factor    CBC & Differential [[id number]]  (Abnormal) Collected: 07/30/25 0517    Specimen: Blood Updated: 07/30/25 0539    Narrative:      The following orders were created for  panel order CBC & Differential.  Procedure                               Abnormality         Status                     ---------                               -----------         ------                     CBC Auto Differential[[id number]]        Abnormal            Final result                 Please view results for these tests on the individual orders.    CBC Auto Differential [[id number]]  (Abnormal) Collected: 07/30/25 0517    Specimen: Blood Updated: 07/30/25 0539     WBC 13.40 10*3/mm3      RBC 4.08 10*6/mm3      Hemoglobin 10.8 g/dL      Hematocrit 32.6 %      MCV 79.9 fL      MCH 26.5 pg      MCHC 33.1 g/dL      RDW 14.8 %      RDW-SD 43.2 fl      MPV 10.9 fL      Platelets 263 10*3/mm3      Neutrophil % 73.9 %      Lymphocyte % 20.1 %      Monocyte % 5.5 %      Eosinophil % 0.1 %      Basophil % 0.2 %      Immature Grans % 0.2 %      Neutrophils, Absolute 9.90 10*3/mm3      Lymphocytes, Absolute 2.69 10*3/mm3      Monocytes, Absolute 0.74 10*3/mm3      Eosinophils, Absolute 0.01 10*3/mm3      Basophils, Absolute 0.03 10*3/mm3      Immature Grans, Absolute 0.03 10*3/mm3      nRBC 0.0 /100 WBC     STAT Lactic Acid, Reflex [[id number]]  (Normal) Collected: 07/29/25 1549    Specimen: Blood Updated: 07/29/25 1612     Lactate 1.7 mmol/L     Lactate Dehydrogenase [[id number]]  (Abnormal) Collected: 07/29/25 0755    Specimen: Blood from Arm, Right Updated: 07/29/25 1405      U/L     STAT Lactic Acid, Reflex [[id number]]  (Abnormal) Collected: 07/29/25 1246    Specimen: Blood Updated: 07/29/25 1321     Lactate 2.2 mmol/L     Fentanyl, Urine - Urine, Clean Catch [[id number]]  (Normal) Collected: 07/29/25 1006    Specimen: Urine, Clean Catch Updated: 07/29/25 1107     Fentanyl, Urine Negative    Narrative:      Negative Threshold:      Fentanyl 5 ng/mL     The normal value for the drug tested is negative. This report includes final unconfirmed screening results to be used for medical treatment purposes only.  Unconfirmed results must not be used for non-medical purposes such as employment or legal testing. Clinical consideration should be applied to any drug of abuse test, particularly when unconfirmed results are used.           Urine Drug Screen - Urine, Clean Catch [[id number]]  (Abnormal) Collected: 07/29/25 1006    Specimen: Urine, Clean Catch Updated: 07/29/25 1105     THC, Screen, Urine Positive     Phencyclidine (PCP), Urine Negative     Cocaine Screen, Urine Negative     Methamphetamine, Ur Negative     Opiate Screen Negative     Amphetamine Screen, Urine Negative     Benzodiazepine Screen, Urine Negative     Tricyclic Antidepressants Screen Negative     Methadone Screen, Urine Negative     Barbiturates Screen, Urine Negative     Oxycodone Screen, Urine Negative     Buprenorphine, Screen, Urine Negative    Narrative:      Cutoff For Drugs Screened:    Amphetamines               500 ng/ml  Barbiturates               200 ng/ml  Benzodiazepines            150 ng/ml  Cocaine                    150 ng/ml  Methadone                  200 ng/ml  Opiates                    100 ng/ml  Phencyclidine               25 ng/ml  THC                         50 ng/ml  Methamphetamine            500 ng/ml  Tricyclic Antidepressants  300 ng/ml  Oxycodone                  100 ng/ml  Buprenorphine               10 ng/ml    The normal value for all drugs tested is negative. This report includes unconfirmed screening results, with the cutoff values listed, to be used for medical treatment purposes only.  Unconfirmed results must not be used for non-medical purposes such as employment or legal testing.  Clinical consideration should be applied to any drug of abuse test, particularly when unconfirmed results are used.      Urinalysis With Culture If Indicated - Urine, Clean Catch [[id number]]  (Abnormal) Collected: 07/29/25 1007    Specimen: Urine, Clean Catch Updated: 07/29/25 1016     Color, UA Yellow     Appearance, UA Clear     pH, UA  "7.5     Specific Gravity, UA >1.030     Glucose,  mg/dL (Trace)     Ketones, UA Negative     Bilirubin, UA Negative     Blood, UA Negative     Protein, UA Trace     Leuk Esterase, UA Negative     Nitrite, UA Negative     Urobilinogen, UA 1.0 E.U./dL    Narrative:      In absence of clinical symptoms, the presence of pyuria, bacteria, and/or nitrites on the urinalysis result does not correlate with infection.  Urine microscopic not indicated.    Lactic Acid, Plasma [[id number]]  (Abnormal) Collected: 07/29/25 0920    Specimen: Blood Updated: 07/29/25 0950     Lactate 2.8 mmol/L     Blood Culture - Blood, Arm, Left [[id number]] Collected: 07/29/25 0920    Specimen: Blood from Arm, Left Updated: 07/29/25 0927    Procalcitonin [[id number]]  (Normal) Collected: 07/29/25 0755    Specimen: Blood from Arm, Right Updated: 07/29/25 0911     Procalcitonin 0.03 ng/mL     Narrative:      As a Marker for Sepsis (Non-Neonates):    1. <0.5 ng/mL represents a low risk of severe sepsis and/or septic shock.  2. >2 ng/mL represents a high risk of severe sepsis and/or septic shock.    As a Marker for Lower Respiratory Tract Infections that require antibiotic therapy:    PCT on Admission    Antibiotic Therapy       6-12 Hrs later    >0.5                Strongly Recommended  >0.25 - <0.5        Recommended   0.1 - 0.25          Discouraged              Remeasure/reassess PCT  <0.1                Strongly Discouraged     Remeasure/reassess PCT    As 28 day mortality risk marker: \"Change in Procalcitonin Result\" (>80% or <=80%) if Day 0 (or Day 1) and Day 4 values are available. Refer to http://www.[facility]-pct-calculator.com    Change in PCT <=80%  A decrease of PCT levels below or equal to 80% defines a positive change in PCT test result representing a higher risk for 28-day all-cause mortality of patients diagnosed with severe sepsis for septic shock.    Change in PCT >80%  A decrease of PCT levels of more than 80% defines a negative " change in PCT result representing a lower risk for 28-day all-cause mortality of patients diagnosed with severe sepsis or septic shock.       Blood Culture - Blood, Arm, Right [[id number]] Collected: 07/29/25 0853    Specimen: Blood from Arm, Right Updated: 07/29/25 0904    C-reactive Protein [[id number]]  (Normal) Collected: 07/29/25 0755    Specimen: Blood from Arm, Right Updated: 07/29/25 0842     C-Reactive Protein <0.30 mg/dL     Basic Metabolic Panel [[id number]]  (Abnormal) Collected: 07/29/25 0755    Specimen: Blood from Arm, Right Updated: 07/29/25 0826     Glucose 189 mg/dL      BUN 8.8 mg/dL      Creatinine 0.54 mg/dL      Sodium 139 mmol/L      Potassium 3.3 mmol/L      Chloride 104 mmol/L      CO2 21.0 mmol/L      Calcium 8.8 mg/dL      BUN/Creatinine Ratio 16.3     Anion Gap 14.0 mmol/L      eGFR 133.7 mL/min/1.73     Narrative:      GFR Categories in Chronic Kidney Disease (CKD)              GFR Category          GFR (mL/min/1.73)    Interpretation  G1                    90 or greater        Normal or high (1)  G2                    60-89                Mild decrease (1)  G3a                   45-59                Mild to moderate decrease  G3b                   30-44                Moderate to severe decrease  G4                    15-29                Severe decrease  G5                    14 or less           Kidney failure    (1)In the absence of evidence of kidney disease, neither GFR category G1 or G2 fulfill the criteria for CKD.    eGFR calculation 2021 CKD-EPI creatinine equation, which does not include race as a factor    Venous Blood Gas with Coox and Lactate [[id number]]  (Abnormal) Collected: 07/29/25 0809    Specimen: Venous Blood Updated: 07/29/25 0810     pH, Venous 7.306 pH Units      Comment: 84 Value below reference range        pCO2, Venous 47.0 mm Hg      pO2, Venous 55.1 mm Hg      Comment: 83 Value above reference range        HCO3, Venous 23.4 mmol/L      Base Excess, Venous -3.1  mmol/L      Comment: 84 Value below reference range        Hemoglobin, Blood Gas 11.4 g/dL      Comment: 84 Value below reference range        Hematocrit, Blood Gas 35.1 %      Comment: 84 Value below reference range        Oxyhemoglobin Venous 84.7 %      Methemoglobin Venous 0.4 %      Carboxyhemoglobin Venous 2.8 %      O2 Saturation, Venous 87.5 %      Comment: 83 Value above reference range        Ionized Calcium --     Sodium, Venous 143 mmol/L      Potassium, Venous 3.2 mmol/L      Comment: 84 Value below reference range        Glucose, Arterial 183 mg/dL      A-a DO2 --     Volume % O2 --     Temperature 37.0     Barometric Pressure for Blood Gas 756 mmHg      Site Nurse/Dr Draw     Modality Room Air     FIO2 --     Flow Rate --     Nitric Oxide --     Ventilator Mode NA     Set Tidal Volume --     Set Mech Resp Rate --     Rate --     PEEP --     PSV --     PIP --     IPAP --     EPAP --     CPAP --     Pulse Ox --     Notified Who DR VILLARREAL     Notified By Reed Quintero, RASHAD     Notified Time 07/29/2025 08:10     Collected by [id number]     Comment: Meter: [id number]     :  Reed Quintero CRT        Note --     Lactate, whole blood 2.6 mmol/L      Comment: 86 Value above critical limit             Imaging Results (Last 24 Hours)       Procedure Component Value Units Date/Time    US Pelvis Complete [[id number]] Collected: 07/29/25 1530     Updated: 07/29/25 1537    Narrative:      EXAMINATION: US PELVIS COMPLETE- 7/29/2025 3:30 PM     HISTORY: abnormal CT uterus and ovaries with vascular abnormalities and  free fluid in pelvis; K52.9-Noninfective gastroenteritis and colitis,  unspecified; K52.9-Noninfective gastroenteritis and colitis,  unspecified; E86.0-Dehydration; R73.9-Hyperglycemia, unspecified;  E87.20-Acidosis, unspecified.     Images are stored in PACS per institutional protocols and regulatory  requirements.     REPORT: Sonographic images of the pelvis were obtained  transabdominally.  Images were obtained in the transverse and longitudinal dimensions.     COMPARISON: CT abdomen pelvis 7/29/2025.     The technologist reports that the examination is technically difficult  due to the patient's constant motion, excessive bowel gas, nausea and  vomiting.     The uterus measures 7.5 x 3.8 x 4.9 cm and appears anteverted. There is  shadowing from the IUD within the endometrial cavity. No endometrial  thickening is identified.     The right ovary measures 3.5 x 2.4 x 2.1 cm and appears unremarkable.  Color and spectral Doppler images demonstrate arterial and venous blood  flow within the right ovary.     The left ovary measures 4.1 x 2.0 x 1.9 cm and appears normal. Color and  spectral Doppler images demonstrate arterial and venous blood flow  within the left ovary. No free fluid is identified.       Impression:      No acute sonographic pelvic abnormality, findings suggestive  of pelvic vascular congestion syndrome are better demonstrated on CT  today. Normal appearance of the uterus and ovaries, an IUD is present  within the uterus.     This report was signed and finalized on 7/29/2025 3:34 PM by Dr. Tyrone Quiroz MD.       US Abdomen Limited - In process [[id number]] Resulted: 07/29/25 1150     Updated: 07/29/25 1426    This result has not been signed. Information might be incomplete.            I have personally reviewed and interpreted the radiology studies and ECG obtained at time of admission.     Assessment / Plan   Assessment:   Active Hospital Problems    Diagnosis     **Enterocolitis     Metabolic acidosis     Intractable nausea and vomiting     Cannabinoid hyperemesis syndrome        Treatment Plan  The patient will be admitted to Dr. Mensah with hospital medicine  service here at [facility].   Enterocolitis  CT abdomen pelvis remarkable for diffuse thickening of the wall and mucosa of the entire small bowel with enhancement, moderate thickening of the wall  of the colon patient  ED infused 1 L lactated Ringer's +1 L normal saline bolus  Lactated Ringer's infusion at 100 mL/h  Ultrasound of the abdomen and pelvis for further evaluation of abnormalities in the pelvis, to evaluate appendix and gallbladder as well as free fluid in the pelvis for etiology of sudden onset intractable nausea and vomiting: Reports pending  CBC and CMP repeat in the morning  Stool GI panel ordered, patient has not had a bowel movement to be collected since ordered per nurse report patient  Intractable nausea and vomiting  Zofran every 4 hours as needed  Compazine IV every 6 hours as needed, listed indication for cannabinoid hyperemesis treatment  Clear liquid diet  I/O are not being documented by nursing staff and EHR  Metabolic acidosis  Received 1 L LR bolus +1 L NS bolus, initial lactate 2.6> 2.8-> 1.7  Cannabinoid hyperemesis syndrome  Clear liquid diet  Alternate Zofran 4 mg every 4 hours as needed and Compazine IV 5 mg every 6 hours as needed for nausea and vomiting control  Lactated Ringer's infusion at 100 mL/h  Educated on abstinence of use of THC products    Medical Decision Making  Number and Complexity of problems: 4  4 acute problems, high complexity, unimproved, stable but guarded  Differential Diagnosis: C. difficile colitis, foodborne illness, cholecystitis, viral illness    Conditions and Status        Condition is unchanged.     MDM Data  External documents reviewed: Yes yes  Cardiac tracing (EKG, telemetry) interpretation: Yes  Radiology interpretation: Diagnostic imaging reports reviewed  Labs reviewed: Previous and ongoing labs reviewed  Any tests that were considered but not ordered: Intravaginal ultrasound     Decision rules/scores evaluated (example [id number], Wells, etc): N/A     Discussed with: Patient and Dr. Mensah     Care Planning  Shared decision making: Patient and Dr. Mensah  Code status and discussions: Patient wishes to be full code    Disposition  Social  Determinants of Health that impact treatment or disposition: No  Estimated length of stay is 1-3 days.     I confirmed that the patient's advanced care plan is present, code status is documented, and a surrogate decision maker is listed in the patient's medical record.     The patient's surrogate decision maker is her significant other Jed Ivy.     The patient was seen and examined by me on 7/29/2025 at 10: 30 CDT.    Electronically signed by INDIA Najera, 07/29/25, 14:57 CDT.              Electronically signed by Con Mensah MD at 07/30/25 0926          Emergency Department Notes        Bakari Fernando MD at 07/29/25 0941          Subjective   History of Present Illness    Review of Systems    Past Medical History:   Diagnosis Date    ADHD     Allergic     Anxiety     Asthma     Depression     History of transfusion     Hyperemesis gravidarum 2022       Allergies   Allergen Reactions    Blackberry [Rubus Fruticosus] Rash    Blue Dyes (Parenteral) Rash       Past Surgical History:   Procedure Laterality Date    D & C WITH SUCTION      DILATATION AND CURETTAGE N/A 07/01/2023    Procedure: DILATATION AND CURETTAGE;  Surgeon: Darryl Del Valle MD;  Location: [facility] OR;  Service: Obstetrics/Gynecology;  Laterality: N/A;    FOOT SURGERY         Family History   Problem Relation Age of Onset    Heart attack Father     Diabetes Father     Alcohol abuse Father     Arthritis Mother     Depression Mother     Dementia Mother     Hypertension Mother     Hypertension Maternal Grandmother     Diabetes Maternal Grandfather     Hypertension Maternal Grandfather     Heart disease Maternal Grandfather     Breast cancer Neg Hx     Ovarian cancer Neg Hx     Uterine cancer Neg Hx     Colon cancer Neg Hx     Melanoma Neg Hx        Social History     Socioeconomic History    Marital status: Single   Tobacco Use    Smoking status: Former     Current packs/day: 0.00     Types: Cigarettes     Quit date: 2/24/2020      Years since quittin.4    Smokeless tobacco: Never   Vaping Use    Vaping status: Every Day    Substances: Nicotine    Devices: Disposable   Substance and Sexual Activity    Alcohol use: No    Drug use: Yes     Types: Marijuana     Comment: last use 24    Sexual activity: Yes     Partners: Male     Birth control/protection: I.U.D.           Objective   Physical Exam    Procedures          ED Course  ED Course as of 25 0948    2025   0702 20-year-old female presents to the ED with nausea, vomiting, diarrhea, abdominal pain.  White blood cell count 23,000.  Had mild diffuse abdominal tenderness on exam.  CT scan ordered and pending.  Signing over patient care to Dr. Fernando who will follow-up on imaging and disposition accordingly. [AW]   0731 Patient was seen by Dr. Siegel please refer to his care for the details the patient came into the ED with nausea and vomiting lab workup initiated venous blood gas can be obtained IV fluids given the patient.  We are waiting on a CT scan to be performed. [TS]   0939 Patient was similar to Michelle please refer to his records for further details came in with complaints of vomiting.  On abdominal examination she has diffuse tenderness there is no localized guarding or rebound tenderness.  She appears pale and appears sick.  Blood sugar is elevated with bicarb at 21 anion gap of 14.  Therefore VBG's were obtained which showed a pH 7.37 she is slightly acidemic but does not have any anion gap acidosis.  After IV fluids the CO2 has remained low.  Her white cell count 23 but her CRP and Pro-Zay is negative.  Will discuss this case with the hospitalist service.  CT scan shows enteritis/colitis [TS]   0947 This lady has multiple different issues going on she is having some nausea vomiting had some diarrhea earlier no diarrhea right blood sugar is elevated does not have any history of diabetes as far as we know.  This does not appear to be DKA with negative  anion gap but she does have acidosis her CRP is negative her lactic acid is in the process of being collected.  And a magnesium is normal.  Her Pro-Zay is negative so the possibility of bacterial infection is less likely.  But with a central colitis and VBG which is abnormal Liposyn will be to put her in the hospital with IV fluids and repeat lab workup and diagnostics. [TS]      ED Course User Index  [AW] Javier Siegel MD  [TS] Bakari Fernando MD                                                       Medical Decision Making  Problems Addressed:  Acidosis: complicated acute illness or injury  Dehydration: complicated acute illness or injury  Enterocolitis: complicated acute illness or injury  Gastroenteritis: complicated acute illness or injury  Hyperglycemia: complicated acute illness or injury    Amount and/or Complexity of Data Reviewed  Labs: ordered.     Details: Labs reviewed  Radiology: ordered.     Details: CAT scan reviewed    Risk  Prescription drug management.  Decision regarding hospitalization.  Risk Details: Patient will be admitted for IV fluids IV hydration still vomiting        Final diagnoses:   Gastroenteritis   Enterocolitis   Dehydration   Hyperglycemia   Acidosis       ED Disposition  ED Disposition       ED Disposition   Decision to Admit    Condition   --    Comment   Level of Care: Med/Surg [1]   Diagnosis: Enterocolitis [971130]   Admitting Physician: MARIUM JACKSON [[id number]]   Attending Physician: MARIUM JACKSON [[id number]]   Certification: I Certify That Inpatient Hospital Services Are Medically Necessary For Greater Than 2 Midnights                 No follow-up provider specified.       Medication List        New Prescriptions      famotidine 20 MG tablet  Commonly known as: PEPCID  Take 1 tablet by mouth 2 (Two) Times a Day.     ondansetron ODT 8 MG disintegrating tablet  Commonly known as: ZOFRAN-ODT  Place 1 tablet on the tongue Every 8 (Eight) Hours As Needed for Nausea.                Where to Get Your Medications        These medications were sent to [facility] - [place], [place] - [address] - [phone]  - [phone]  [address] [facility] [address]      Phone: [phone]   famotidine 20 MG tablet  ondansetron ODT 8 MG disintegrating tablet            Bakari Fernando MD  07/29/25 0942       Bakari Fernando MD  07/29/25 0948      Electronically signed by Bakari Fernando MD at 07/29/25 0948       Javier Siegel MD at 07/29/25 0643          Subjective   History of Present Illness  20-year-old female presents to the ED with complaint of nausea, vomiting, diarrhea.  She reports sudden onset of symptoms approximately 2 hours return to the ED.  She has had numerous episodes of nonbloody nonbilious emesis, 2 through episodes of nonbloody nonblack and watery diarrhea.  No associated fever or chills.  Does complain of mild abdominal discomfort.  No dysuria, hematuria, frequency or urgency.  No cough, sore throat, runny.  She rates her symptoms as moderate to severe with no aggravating relieving factors.    History provided by:  Patient      Review of Systems   All other systems reviewed and are negative.      Past Medical History:   Diagnosis Date    ADHD     Allergic     Anxiety     Asthma     Depression     History of transfusion     Hyperemesis gravidarum 2022       Allergies   Allergen Reactions    Blackberry [Rubus Fruticosus] Rash    Blue Dyes (Parenteral) Rash       Past Surgical History:   Procedure Laterality Date    D & C WITH SUCTION      DILATATION AND CURETTAGE N/A 07/01/2023    Procedure: DILATATION AND CURETTAGE;  Surgeon: Darryl Del Valle MD;  Location: [facility] OR;  Service: Obstetrics/Gynecology;  Laterality: N/A;    FOOT SURGERY         Family History   Problem Relation Age of Onset    Heart attack Father     Diabetes Father     Alcohol abuse Father     Arthritis Mother     Depression Mother     Dementia Mother     Hypertension Mother      Hypertension Maternal Grandmother     Diabetes Maternal Grandfather     Hypertension Maternal Grandfather     Heart disease Maternal Grandfather     Breast cancer Neg Hx     Ovarian cancer Neg Hx     Uterine cancer Neg Hx     Colon cancer Neg Hx     Melanoma Neg Hx        Social History     Socioeconomic History    Marital status: Single   Tobacco Use    Smoking status: Former     Current packs/day: 0.00     Types: Cigarettes     Quit date: 2020     Years since quittin.4    Smokeless tobacco: Never   Vaping Use    Vaping status: Every Day    Substances: Nicotine    Devices: Disposable   Substance and Sexual Activity    Alcohol use: No    Drug use: Yes     Types: Marijuana     Comment: last use 24    Sexual activity: Yes     Partners: Male     Birth control/protection: I.U.D.           Objective   Physical Exam  Vitals and nursing note reviewed.   Constitutional:       Comments: Generally weak and mildly ill-appearing young adult female, appears uncomfortable but does not appear to be in distress   HENT:      Head: Normocephalic and atraumatic.      Nose: Nose normal. No congestion or rhinorrhea.      Mouth/Throat:      Mouth: Mucous membranes are moist.   Eyes:      Conjunctiva/sclera: Conjunctivae normal.      Pupils: Pupils are equal, round, and reactive to light.   Cardiovascular:      Rate and Rhythm: Normal rate and regular rhythm.      Heart sounds: Normal heart sounds. No murmur heard.  Pulmonary:      Effort: Pulmonary effort is normal.      Breath sounds: Normal breath sounds. No wheezing, rhonchi or rales.   Abdominal:      General: Abdomen is flat. Bowel sounds are normal.      Palpations: Abdomen is soft.   Musculoskeletal:      Right lower leg: No edema.      Left lower leg: No edema.   Skin:     General: Skin is warm and dry.      Capillary Refill: Capillary refill takes less than 2 seconds.         Procedures        Lab Results (last 24 hours)       Procedure Component Value Units  Date/Time    CBC & Differential [[id number]]  (Abnormal) Collected: 07/29/25 0618    Specimen: Blood Updated: 07/29/25 0629    Narrative:      The following orders were created for panel order CBC & Differential.  Procedure                               Abnormality         Status                     ---------                               -----------         ------                     CBC Auto Differential[[id number]]        Abnormal            Final result                 Please view results for these tests on the individual orders.    Comprehensive Metabolic Panel [[id number]]  (Abnormal) Collected: 07/29/25 0618    Specimen: Blood Updated: 07/29/25 0648     Glucose 195 mg/dL      BUN 10.5 mg/dL      Creatinine 0.56 mg/dL      Sodium 142 mmol/L      Potassium 3.7 mmol/L      Chloride 106 mmol/L      CO2 21.0 mmol/L      Calcium 9.6 mg/dL      Total Protein 7.5 g/dL      Albumin 4.7 g/dL      ALT (SGPT) 12 U/L      AST (SGOT) 18 U/L      Alkaline Phosphatase 51 U/L      Total Bilirubin 0.3 mg/dL      Globulin 2.8 gm/dL      A/G Ratio 1.7 g/dL      BUN/Creatinine Ratio 18.8     Anion Gap 15.0 mmol/L      eGFR 132.5 mL/min/1.73     Narrative:      GFR Categories in Chronic Kidney Disease (CKD)              GFR Category          GFR (mL/min/1.73)    Interpretation  G1                    90 or greater        Normal or high (1)  G2                    60-89                Mild decrease (1)  G3a                   45-59                Mild to moderate decrease  G3b                   30-44                Moderate to severe decrease  G4                    15-29                Severe decrease  G5                    14 or less           Kidney failure    (1)In the absence of evidence of kidney disease, neither GFR category G1 or G2 fulfill the criteria for CKD.    eGFR calculation 2021 CKD-EPI creatinine equation, which does not include race as a factor    Protime-INR [[id number]]  (Abnormal) Collected: 07/29/25 0618    Specimen:  Blood Updated: 07/29/25 0645     Protime 15.6 Seconds      INR 1.18    Magnesium [[id number]]  (Normal) Collected: 07/29/25 0618    Specimen: Blood Updated: 07/29/25 0648     Magnesium 1.9 mg/dL     CBC Auto Differential [[id number]]  (Abnormal) Collected: 07/29/25 0618    Specimen: Blood Updated: 07/29/25 0629     WBC 23.51 10*3/mm3      RBC 4.56 10*6/mm3      Hemoglobin 12.0 g/dL      Hematocrit 37.4 %      MCV 82.0 fL      MCH 26.3 pg      MCHC 32.1 g/dL      RDW 14.8 %      RDW-SD 44.1 fl      MPV 11.0 fL      Platelets 327 10*3/mm3      Neutrophil % 77.7 %      Lymphocyte % 16.5 %      Monocyte % 4.2 %      Eosinophil % 0.9 %      Basophil % 0.3 %      Immature Grans % 0.4 %      Neutrophils, Absolute 18.27 10*3/mm3      Lymphocytes, Absolute 3.89 10*3/mm3      Monocytes, Absolute 0.98 10*3/mm3      Eosinophils, Absolute 0.21 10*3/mm3      Basophils, Absolute 0.06 10*3/mm3      Immature Grans, Absolute 0.10 10*3/mm3      nRBC 0.0 /100 WBC     hCG, Quantitative, Pregnancy [[id number]] Collected: 07/29/25 0621    Specimen: Blood Updated: 07/29/25 0655     HCG Quantitative 0.10 mIU/mL     Narrative:      HCG Ranges by Gestational Age    Females - non-pregnant premenopausal   </= 1mIU/mL HCG  Females - postmenopausal               </= 7mIU/mL HCG    3 Weeks         5.8 -    71.2 mIU/mL  4 Weeks         9.5 -     750 mIU/mL  5 Weeks         217 -   7,138 mIU/mL  6 Weeks         158 -  31,795 mIU/mL  7 Weeks       3,697 - 163,563 mIU/mL  8 Weeks      32,065 - 149,571 mIU/mL  9 Weeks      63,803 - 151,410 mIU/mL  10 Weeks     46,509 - 186,977 mIU/mL  12 Weeks     27,832 - 210,612 mIU/mL  14 Weeks     13,950 -  62,530 mIU/mL  15 Weeks     12,039 -  70,971 mIU/mL  16 Weeks      9,040 -  56,451 mIU/mL  17 Weeks      8,175 -  55,868 mIU/mL  18 Weeks      8,099 -  58,176 mIU/mL         No Radiology Exams Resulted Within Past 24 Hours   ED Course  ED Course as of 07/29/25 0725   Tue Jul 29, 2025   0702 20-year-old female  presents to the ED with nausea, vomiting, diarrhea, abdominal pain.  White blood cell count 23,000.  Had mild diffuse abdominal tenderness on exam.  CT scan ordered and pending.  Signing over patient care to Dr. Fernando who will follow-up on imaging and disposition accordingly. [AW]      ED Course User Index  [AW] Javier Siegel MD                                                       Medical Decision Making  Amount and/or Complexity of Data Reviewed  Labs: ordered.    Risk  Prescription drug management.        Final diagnoses:   Gastroenteritis       ED Disposition  ED Disposition       None            No follow-up provider specified.       Medication List        New Prescriptions      famotidine 20 MG tablet  Commonly known as: PEPCID  Take 1 tablet by mouth 2 (Two) Times a Day.     ondansetron ODT 8 MG disintegrating tablet  Commonly known as: ZOFRAN-ODT  Place 1 tablet on the tongue Every 8 (Eight) Hours As Needed for Nausea.               Where to Get Your Medications        These medications were sent to [facility] - [place], [place] - [address] - [phone]  - [phone]   [address], [facility] [address]      Phone: [phone]   famotidine 20 MG tablet  ondansetron ODT 8 MG disintegrating tablet            Javier Siegel MD  07/29/25 0725      Electronically signed by Javier Siegel MD at 07/29/25 0725       Facility-Administered Medications as of 7/30/2025   Medication Dose Route Frequency Provider Last Rate Last Admin    acetaminophen (TYLENOL) tablet 650 mg  650 mg Oral Q4H PRN Reba Smith APRN        Or    acetaminophen (TYLENOL) 160 MG/5ML oral solution 650 mg  650 mg Oral Q4H PRN Reba Smith APRN        Or    acetaminophen (TYLENOL) suppository 650 mg  650 mg Rectal Q4H PRN Reba Smith APRN        aluminum-magnesium hydroxide-simethicone (MAALOX MAX) 400-400-40 MG/5ML suspension 15 mL  15 mL Oral Q6H PRN Reba Smith APRN         Calcium Replacement - Follow Nurse / BPA Driven Protocol   Not Applicable PRN Con Mensah MD        dextrose 5 % and sodium chloride 0.45 % with KCl 20 mEq/L infusion  100 mL/hr Intravenous Continuous Con Mensah  mL/hr at 25 1515 100 mL/hr at 25 1515    [COMPLETED] famotidine (PEPCID) injection 20 mg  20 mg Intravenous Once Javier Siegel MD   20 mg at 25 0621    [COMPLETED] iopamidol (ISOVUE-300) 61 % injection 100 mL  100 mL Intravenous Once in imaging Javier Siegel MD   100 mL at 25 0717    [COMPLETED] lactated ringers bolus 1,000 mL  1,000 mL Intravenous Once Javier Siegel MD   Stopped at 25 0750    [] lactated ringers infusion  125 mL/hr Intravenous Continuous Con Mensah MD   Stopped at 25 2121    Magnesium Low Dose Replacement - Follow Nurse / BPA Driven Protocol   Not Applicable PRN Con Mensah MD        metoclopramide (REGLAN) injection 10 mg  10 mg Intravenous Q6H Con Mensah MD        [COMPLETED] ondansetron (ZOFRAN) injection 4 mg  4 mg Intravenous Once Javier Siegel MD   4 mg at 25 0621    ondansetron ODT (ZOFRAN-ODT) disintegrating tablet 4 mg  4 mg Oral Q6H PRN Reba Smith APRN        Or    ondansetron (ZOFRAN) injection 4 mg  4 mg Intravenous Q6H PRN Reba Smith APRN   4 mg at 25 0612    [COMPLETED] ondansetron (ZOFRAN) injection 4 mg  4 mg Intravenous Once Reba Smith APRN   4 mg at 25 1336    Phosphorus Replacement - Follow Nurse / BPA Driven Protocol   Not Applicable Con Causey MD        [COMPLETED] potassium chloride 10 mEq in 100 mL IVPB  10 mEq Intravenous Once Bakari Fernando MD   Stopped at 25 1020    [COMPLETED] potassium chloride 10 mEq in 100 mL IVPB  10 mEq Intravenous Q1H Con Mensah  mL/hr at 25 1715 10 mEq at 25 1715    Potassium Replacement - Follow Nurse / BPA Driven Protocol   Not  Applicable PRN Con Mensah MD        prochlorperazine (COMPAZINE) injection 5 mg  5 mg Intravenous Q6H PRN Reba Smith APRN   5 mg at 07/30/25 0920    [COMPLETED] sodium chloride 0.9 % bolus 1,000 mL  1,000 mL Intravenous Once Bakari Fernando MD   Stopped at 07/29/25 1007    sodium chloride 0.9 % flush 10 mL  10 mL Intravenous PRN Reba Smith, APRNIKOLAY        sodium chloride 0.9 % flush 10 mL  10 mL Intravenous Q12H Reba Smith APRN   10 mL at 07/30/25 0911    sodium chloride 0.9 % flush 10 mL  10 mL Intravenous PRN Reba Smith, APRNIKOLAY        sodium chloride 0.9 % infusion 40 mL  40 mL Intravenous PRN Reba Smith, ALEJANDRA         Orders (all)        Start     Ordered    07/31/25 0947  Auto Discontinue GI Panel in 48 Hours if not Collected  ONCE GI PANEL         07/29/25 0946    07/30/25 1800  metoclopramide (REGLAN) injection 10 mg  Every 6 Hours         07/30/25 1425    07/30/25 1515  metoclopramide (REGLAN) injection 10 mg  Every 6 Hours,   Status:  Discontinued         07/30/25 1420    07/30/25 1515  potassium chloride 10 mEq in 100 mL IVPB  Every 1 Hour         07/30/25 1421    07/30/25 1500  dextrose 5 % and sodium chloride 0.45 % with KCl 20 mEq/L infusion  Continuous         07/30/25 1414    07/30/25 1415  Pharmacy to dose vancomycin  Continuous PRN,   Status:  Discontinued         07/30/25 1419    07/30/25 1412  Blood Culture With LONI - Blood, Arm, Left  Once         07/30/25 1411    07/30/25 1130  metoclopramide (REGLAN) tablet 10 mg  3 Times Daily Before Meals,   Status:  Discontinued         07/30/25 0955    07/30/25 1015  Blood Culture ID, PCR - Blood, Arm, Left  Once         07/30/25 1014    07/30/25 0900  lactated ringers infusion  Continuous,   Status:  Discontinued         07/30/25 0802    07/30/25 0900  potassium chloride (KLOR-CON M20) CR tablet 40 mEq  Once,   Status:  Discontinued         07/30/25 0804    07/30/25 0900  metroNIDAZOLE (FLAGYL) IVPB 500 mg  Every 8  Hours,   Status:  Discontinued         07/30/25 0808    07/30/25 0900  cefTRIAXone (ROCEPHIN) 2,000 mg in sodium chloride 0.9 % 100 mL MBP  Every 24 Hours,   Status:  Discontinued         07/30/25 0811    07/30/25 0805  Magnesium  Daily       07/30/25 0804    07/30/25 0804  Potassium Replacement - Follow Nurse / BPA Driven Protocol  As Needed         07/30/25 0804    07/30/25 0804  Magnesium Low Dose Replacement - Follow Nurse / BPA Driven Protocol  As Needed         07/30/25 0804    07/30/25 0804  Phosphorus Replacement - Follow Nurse / BPA Driven Protocol  As Needed         07/30/25 0804    07/30/25 0804  Calcium Replacement - Follow Nurse / BPA Driven Protocol  As Needed         07/30/25 0804    07/30/25 0803  Diet: Gastrointestinal; Fiber-Restricted, Low Irritant; Texture: Soft to Chew (NDD 3); Soft to Chew: Whole Meat; Fluid Consistency: Thin (IDDSI 0)  Diet Effective Now         07/30/25 0802    07/30/25 0600  CBC & Differential  Daily       07/29/25 1347    07/30/25 0600  Basic Metabolic Panel  Daily       07/29/25 1347    07/30/25 0600  CBC Auto Differential  PROCEDURE ONCE         07/29/25 2201    07/29/25 1800  Oral Care  2 Times Daily       07/29/25 1347    07/29/25 1628  Inpatient Case Management  Consult  Once        Provider:  (Not yet assigned)    07/29/25 1628    07/29/25 1600  Vital Signs  Every 4 Hours       07/29/25 1347    07/29/25 1551  Vascular Access Consult  Once        Provider:  (Not yet assigned)    07/29/25 1551    07/29/25 1546  STAT Lactic Acid, Reflex  PROCEDURE ONCE         07/29/25 1321    07/29/25 1408  If pt has continued nausea, Administer prochlorperazine IVP instead of zofran, nausea and vomiting is likely being worsened by THC use causing cannibinoid hyperemesis, give prochlorperazine IVPush OR on mini infusion pump . Make sure you do not pu...  Once        Comments: If pt has continued nausea, Administer prochlorperazine IVP instead of zofran, nausea and  "vomiting is likely being worsened by THC use causing cannibinoid hyperemesis, give prochlorperazine IVPush OR on mini infusion pump . Make sure you do not push fast because it will cause severe anxiety reaction if given too rapidly. IVP over 3-5 minutes.    07/29/25 1411    07/29/25 1404  prochlorperazine (COMPAZINE) injection 5 mg  Every 6 Hours PRN         07/29/25 1405    07/29/25 1403  sodium chloride 0.9 % flush 10 mL  Every 12 Hours Scheduled         07/29/25 1347    07/29/25 1400  Incentive Spirometry  Every 4 Hours While Awake       07/29/25 1347    07/29/25 1348  Intake & Output  Every Shift       07/29/25 1347    07/29/25 1348  Weigh Patient  Once         07/29/25 1347    07/29/25 1348  Insert Peripheral IV  Once         07/29/25 1347    07/29/25 1348  Saline Lock & Maintain IV Access  Continuous         07/29/25 1347    07/29/25 1348  Place Sequential Compression Device  Once         07/29/25 1347    07/29/25 1348  Maintain Sequential Compression Device  Continuous         07/29/25 1347    07/29/25 1348  Activity - Ad Sara  Until Discontinued         07/29/25 1347    07/29/25 1348  Notify Provider (With Default Parameters)  Continuous        Comments: Open Order Report to View Parameters Requiring Provider Notification    07/29/25 1347    07/29/25 1348  Diet: Liquid; Clear Liquid; Fluid Consistency: Thin (IDDSI 0)  Diet Effective Now,   Status:  Canceled         07/29/25 1347    07/29/25 1348  Bowel Regimen Not Indicated  Once         07/29/25 1347    07/29/25 1348  Lactate Dehydrogenase  Once         07/29/25 1347    07/29/25 1347  sodium chloride 0.9 % flush 10 mL  As Needed         07/29/25 1347    07/29/25 1347  sodium chloride 0.9 % infusion 40 mL  As Needed         07/29/25 1347    07/29/25 1347  acetaminophen (TYLENOL) tablet 650 mg  Every 4 Hours PRN        Placed in \"Or\" Linked Group    07/29/25 1347    07/29/25 1347  acetaminophen (TYLENOL) 160 MG/5ML oral solution 650 mg  Every 4 Hours PRN      " "  Placed in \"Or\" Linked Group    07/29/25 1347    07/29/25 1347  acetaminophen (TYLENOL) suppository 650 mg  Every 4 Hours PRN        Placed in \"Or\" Linked Group    07/29/25 1347    07/29/25 1347  ondansetron ODT (ZOFRAN-ODT) disintegrating tablet 4 mg  Every 6 Hours PRN        Placed in \"Or\" Linked Group    07/29/25 1347    07/29/25 1347  ondansetron (ZOFRAN) injection 4 mg  Every 6 Hours PRN        Placed in \"Or\" Linked Group    07/29/25 1347    07/29/25 1347  aluminum-magnesium hydroxide-simethicone (MAALOX MAX) 400-400-40 MG/5ML suspension 15 mL  Every 6 Hours PRN         07/29/25 1347    07/29/25 1239  ondansetron (ZOFRAN) injection 4 mg  Once         07/29/25 1223    07/29/25 1220  STAT Lactic Acid, Reflex  PROCEDURE ONCE         07/29/25 0950    07/29/25 1054  Fentanyl, Urine - Urine, Clean Catch  Once         07/29/25 1053    07/29/25 1016  lactated ringers infusion  Continuous         07/29/25 1000    07/29/25 1014  US Abdomen Complete  1 Time Imaging,   Status:  Canceled         07/29/25 1015    07/29/25 1014  US Pelvis Complete  1 Time Imaging         07/29/25 1015    07/29/25 1014  US Abdomen Limited  1 Time Imaging         07/29/25 1015    07/29/25 1010  Code Status and Medical Interventions: CPR (Attempt to Resuscitate); Full Support  Continuous         07/29/25 1013    07/29/25 1007  Urinalysis With Culture If Indicated - Urine, Clean Catch  Once         07/29/25 1006    07/29/25 0948  Urinalysis With Culture If Indicated -  Once,   Status:  Canceled         07/29/25 0947    07/29/25 0947  Gastrointestinal Panel, PCR - Stool, Per Rectum  Once         07/29/25 0946    07/29/25 0947  Inpatient Admission  Once         07/29/25 0947    07/29/25 0947  Discontinue Cardiac Monitoring  Once         07/29/25 0947    07/29/25 0903  potassium chloride 10 mEq in 100 mL IVPB  Once         07/29/25 0847    07/29/25 0816  Procalcitonin  Once         07/29/25 0815    07/29/25 0816  Lactic Acid, Plasma  Once         " "07/29/25 0815    07/29/25 0816  Blood Culture - Blood, Arm, Left  STAT         07/29/25 0815    07/29/25 0816  Blood Culture - Blood, Arm, Right  STAT         07/29/25 0815    07/29/25 0816  C-reactive Protein  Once         07/29/25 0815    07/29/25 0816  Urine Drug Screen - Urine, Clean Catch  Once         07/29/25 0815    07/29/25 0815  Urinalysis With Microscopic If Indicated (No Culture) - Urine, Catheter  Once,   Status:  Canceled         07/29/25 0815    07/29/25 0811  Venous Blood Gas with Coox and Lactate  PROCEDURE ONCE         07/29/25 0809    07/29/25 0739  sodium chloride 0.9 % bolus 1,000 mL  Once         07/29/25 0723    07/29/25 0733  iopamidol (ISOVUE-300) 61 % injection 100 mL  Once in Imaging         07/29/25 0717    07/29/25 0724  Basic Metabolic Panel  Once         07/29/25 0723    07/29/25 0722  Venous Blood Gas with Coox and Lactate  Once         07/29/25 0721    07/29/25 0703  CT Abdomen Pelvis With Contrast  1 Time Imaging         07/29/25 0702    07/29/25 0628  lactated ringers bolus 1,000 mL  Once         07/29/25 0612    07/29/25 0628  ondansetron (ZOFRAN) injection 4 mg  Once         07/29/25 0612    07/29/25 0628  famotidine (PEPCID) injection 20 mg  Once         07/29/25 0612    07/29/25 0621  hCG, Quantitative, Pregnancy  STAT         07/29/25 0620    07/29/25 0613  Insert Peripheral IV  Once        Placed in \"And\" Linked Group    07/29/25 0612    07/29/25 0613  CBC & Differential  Once         07/29/25 0612    07/29/25 0613  Comprehensive Metabolic Panel  Once         07/29/25 0612    07/29/25 0613  Protime-INR  Once         07/29/25 0612    07/29/25 0613  Magnesium  STAT         07/29/25 0612    07/29/25 0613  POC Urine Pregnancy  STAT,   Status:  Canceled         07/29/25 0612    07/29/25 0613  Monitor Blood Pressure  Per Hospital Policy/Protocol         07/29/25 0612    07/29/25 0613  Cardiac Monitoring  Continuous,   Status:  Canceled        Comments: Follow Standing Orders As " "Outlined in Process Instructions (Open Order Report to View Full Instructions)    07/29/25 0612    07/29/25 0613  Continuous Pulse Oximetry  Continuous         07/29/25 0612    07/29/25 0613  CBC Auto Differential  PROCEDURE ONCE         07/29/25 0612    07/29/25 0612  sodium chloride 0.9 % flush 10 mL  As Needed        Placed in \"And\" Linked Group    07/29/25 0612    07/29/25 0000  ondansetron ODT (ZOFRAN-ODT) 8 MG disintegrating tablet  Every 8 Hours PRN         07/29/25 0704    07/29/25 0000  famotidine (PEPCID) 20 MG tablet  2 Times Daily         07/29/25 0704                  Physician Progress Notes (last 48 hours)  Notes from 07/28/25 1854 through 07/30/25 1854   No notes of this type exist for this encounter.       Consult Notes (last 48 hours)  Notes from 07/28/25 1854 through 07/30/25 1854   No notes of this type exist for this encounter.       "

## 2025-07-31 LAB
ANION GAP SERPL CALCULATED.3IONS-SCNC: 11 MMOL/L (ref 5–15)
BACTERIA SPEC AEROBE CULT: ABNORMAL
BASOPHILS # BLD AUTO: 0.01 10*3/MM3 (ref 0–0.2)
BASOPHILS NFR BLD AUTO: 0.1 % (ref 0–1.5)
BUN SERPL-MCNC: 3.6 MG/DL (ref 6–20)
BUN/CREAT SERPL: 10.3 (ref 7–25)
CALCIUM SPEC-SCNC: 8.6 MG/DL (ref 8.6–10.5)
CHLORIDE SERPL-SCNC: 101 MMOL/L (ref 98–107)
CO2 SERPL-SCNC: 23 MMOL/L (ref 22–29)
CREAT SERPL-MCNC: 0.35 MG/DL (ref 0.57–1)
DEPRECATED RDW RBC AUTO: 42.1 FL (ref 37–54)
EGFRCR SERPLBLD CKD-EPI 2021: 148.4 ML/MIN/1.73
EOSINOPHIL # BLD AUTO: 0.03 10*3/MM3 (ref 0–0.4)
EOSINOPHIL NFR BLD AUTO: 0.4 % (ref 0.3–6.2)
ERYTHROCYTE [DISTWIDTH] IN BLOOD BY AUTOMATED COUNT: 14.6 % (ref 12.3–15.4)
GLUCOSE SERPL-MCNC: 114 MG/DL (ref 65–99)
GRAM STN SPEC: ABNORMAL
HCT VFR BLD AUTO: 35.3 % (ref 34–46.6)
HGB BLD-MCNC: 11.6 G/DL (ref 12–15.9)
IMM GRANULOCYTES # BLD AUTO: 0.03 10*3/MM3 (ref 0–0.05)
IMM GRANULOCYTES NFR BLD AUTO: 0.4 % (ref 0–0.5)
ISOLATED FROM: ABNORMAL
LYMPHOCYTES # BLD AUTO: 1.81 10*3/MM3 (ref 0.7–3.1)
LYMPHOCYTES NFR BLD AUTO: 21.9 % (ref 19.6–45.3)
MAGNESIUM SERPL-MCNC: 1.9 MG/DL (ref 1.6–2.6)
MCH RBC QN AUTO: 26.3 PG (ref 26.6–33)
MCHC RBC AUTO-ENTMCNC: 32.9 G/DL (ref 31.5–35.7)
MCV RBC AUTO: 80 FL (ref 79–97)
MONOCYTES # BLD AUTO: 0.58 10*3/MM3 (ref 0.1–0.9)
MONOCYTES NFR BLD AUTO: 7 % (ref 5–12)
NEUTROPHILS NFR BLD AUTO: 5.82 10*3/MM3 (ref 1.7–7)
NEUTROPHILS NFR BLD AUTO: 70.2 % (ref 42.7–76)
NRBC BLD AUTO-RTO: 0 /100 WBC (ref 0–0.2)
PLATELET # BLD AUTO: 262 10*3/MM3 (ref 140–450)
PMV BLD AUTO: 10.1 FL (ref 6–12)
POTASSIUM SERPL-SCNC: 3.6 MMOL/L (ref 3.5–5.2)
POTASSIUM SERPL-SCNC: 3.8 MMOL/L (ref 3.5–5.2)
RBC # BLD AUTO: 4.41 10*6/MM3 (ref 3.77–5.28)
SODIUM SERPL-SCNC: 135 MMOL/L (ref 136–145)
WBC NRBC COR # BLD AUTO: 8.28 10*3/MM3 (ref 3.4–10.8)

## 2025-07-31 PROCEDURE — 84132 ASSAY OF SERUM POTASSIUM: CPT | Performed by: STUDENT IN AN ORGANIZED HEALTH CARE EDUCATION/TRAINING PROGRAM

## 2025-07-31 PROCEDURE — 83735 ASSAY OF MAGNESIUM: CPT | Performed by: FAMILY MEDICINE

## 2025-07-31 PROCEDURE — 80048 BASIC METABOLIC PNL TOTAL CA: CPT | Performed by: NURSE PRACTITIONER

## 2025-07-31 PROCEDURE — 85025 COMPLETE CBC W/AUTO DIFF WBC: CPT | Performed by: NURSE PRACTITIONER

## 2025-07-31 PROCEDURE — 25010000002 PROCHLORPERAZINE 10 MG/2ML SOLUTION: Performed by: NURSE PRACTITIONER

## 2025-07-31 PROCEDURE — 36415 COLL VENOUS BLD VENIPUNCTURE: CPT | Performed by: NURSE PRACTITIONER

## 2025-07-31 PROCEDURE — 25810000003 SODIUM CHLORIDE 0.9 % SOLUTION: Performed by: STUDENT IN AN ORGANIZED HEALTH CARE EDUCATION/TRAINING PROGRAM

## 2025-07-31 PROCEDURE — 25010000002 ONDANSETRON PER 1 MG: Performed by: NURSE PRACTITIONER

## 2025-07-31 PROCEDURE — 25010000002 POTASSIUM CHLORIDE 10 MEQ/100ML SOLUTION: Performed by: STUDENT IN AN ORGANIZED HEALTH CARE EDUCATION/TRAINING PROGRAM

## 2025-07-31 PROCEDURE — 25010000002 METOCLOPRAMIDE PER 10 MG: Performed by: FAMILY MEDICINE

## 2025-07-31 RX ORDER — POTASSIUM CHLORIDE 7.45 MG/ML
10 INJECTION INTRAVENOUS
Status: COMPLETED | OUTPATIENT
Start: 2025-07-31 | End: 2025-07-31

## 2025-07-31 RX ORDER — SODIUM CHLORIDE 9 MG/ML
75 INJECTION, SOLUTION INTRAVENOUS CONTINUOUS
Status: DISPENSED | OUTPATIENT
Start: 2025-07-31 | End: 2025-08-01

## 2025-07-31 RX ADMIN — PROCHLORPERAZINE EDISYLATE 5 MG: 5 INJECTION, SOLUTION INTRAMUSCULAR; INTRAVENOUS at 20:03

## 2025-07-31 RX ADMIN — Medication 10 ML: at 20:04

## 2025-07-31 RX ADMIN — METOCLOPRAMIDE 10 MG: 5 INJECTION, SOLUTION INTRAMUSCULAR; INTRAVENOUS at 05:16

## 2025-07-31 RX ADMIN — POTASSIUM CHLORIDE 10 MEQ: 7.46 INJECTION, SOLUTION INTRAVENOUS at 09:41

## 2025-07-31 RX ADMIN — POTASSIUM CHLORIDE 10 MEQ: 7.46 INJECTION, SOLUTION INTRAVENOUS at 07:47

## 2025-07-31 RX ADMIN — POTASSIUM CHLORIDE 10 MEQ: 7.46 INJECTION, SOLUTION INTRAVENOUS at 08:25

## 2025-07-31 RX ADMIN — POTASSIUM CHLORIDE 10 MEQ: 7.46 INJECTION, SOLUTION INTRAVENOUS at 10:37

## 2025-07-31 RX ADMIN — Medication 10 ML: at 09:42

## 2025-07-31 RX ADMIN — METOCLOPRAMIDE 10 MG: 5 INJECTION, SOLUTION INTRAMUSCULAR; INTRAVENOUS at 11:32

## 2025-07-31 RX ADMIN — ONDANSETRON 4 MG: 2 INJECTION INTRAMUSCULAR; INTRAVENOUS at 17:58

## 2025-07-31 RX ADMIN — SODIUM CHLORIDE 75 ML/HR: 9 INJECTION, SOLUTION INTRAVENOUS at 13:12

## 2025-07-31 RX ADMIN — ONDANSETRON 4 MG: 2 INJECTION INTRAMUSCULAR; INTRAVENOUS at 09:10

## 2025-07-31 NOTE — PLAN OF CARE
Goal Outcome Evaluation: Pt is alert and oriented, vitals are stable, no s/s of distress, IV is clean dry intact, cont on clear liquid diet, room air, indep in room           Progress: improving

## 2025-07-31 NOTE — PROGRESS NOTES
[facility] Medicine Services  INPATIENT PROGRESS NOTE    Patient Name: Rosmery Padilla  Date of Admission: 7/29/2025  Today's Date: 07/31/25  Length of Stay: 2  Primary Care Physician: Shirin Nuno APRN    Subjective   Chief Complaint: Nausea  HPI   No acute events overnight.  Patient still complaining of some nausea but states it has improved.  Her last episode of vomiting was early this morning.  She has not had a bowel movement since admission.  She tells me that she thinks she ate a hamburger that may have gone fine prior to symptom onset.    Review of Systems   All pertinent negatives and positives are as above. All other systems have been reviewed and are negative unless otherwise stated.     Objective    Temp:  [98 °F (36.7 °C)-98.9 °F (37.2 °C)] 98.1 °F (36.7 °C)  Heart Rate:  [57-71] 63  Resp:  [14-16] 16  BP: ()/(52-89) 131/89  Physical Exam  GEN: Awake, alert, interactive, in NAD on room air  HEENT: Atraumatic, EOMI, Anicteric  Lungs: CTAB, no wheezing/rales/rhonchi  Heart: RRR, +S1/s2, no rub  ABD: soft, nondistended, +BS, tenderness on left quadrants  Extremities: atraumatic, no cyanosis, no edema  Skin: no rashes or lesions  Neuro: AAOx3, no focal deficits  Psych: normal mood & affect        Results Review:  I have reviewed the labs, radiology results, and diagnostic studies.    Laboratory Data:   Results from last 7 days   Lab Units 07/31/25  0519 07/30/25  0517 07/29/25  0618   WBC 10*3/mm3 8.28 13.40* 23.51*   HEMOGLOBIN g/dL 11.6* 10.8* 12.0   HEMATOCRIT % 35.3 32.6* 37.4   PLATELETS 10*3/mm3 262 263 327        Results from last 7 days   Lab Units 07/31/25  0520 07/30/25  0517 07/29/25  0809 07/29/25  0755 07/29/25  0618   SODIUM mmol/L 135* 139  --  139 142   SODIUM, VENOUS mmol/L  --   --  143  --   --    POTASSIUM mmol/L 3.6 3.3*  --  3.3* 3.7   POTASSIUM, VENOUS mmol/L  --   --  3.2*  --   --    CHLORIDE mmol/L 101 104  --  104 106   CO2 mmol/L  23.0 22.0  --  21.0* 21.0*   BUN mg/dL 3.6* 5.9*  --  8.8 10.5   CREATININE mg/dL 0.35* 0.49*  --  0.54* 0.56*   CALCIUM mg/dL 8.6 8.8  --  8.8 9.6   BILIRUBIN mg/dL  --   --   --   --  0.3   ALK PHOS U/L  --   --   --   --  51   ALT (SGPT) U/L  --   --   --   --  12   AST (SGOT) U/L  --   --   --   --  18   GLUCOSE mg/dL 114* 82  --  189* 195*   GLUCOSE, ARTERIAL mg/dL  --   --  183*  --   --        Culture Data:   [facility]@    Radiology Data:   Imaging Results (Last 24 Hours)       ** No results found for the last 24 hours. **            I have reviewed the patient's current medications.     Assessment/Plan   Assessment  Active Hospital Problems    Diagnosis     **Enterocolitis     Metabolic acidosis     Intractable nausea and vomiting     Cannabinoid hyperemesis syndrome        Treatment Plan    # Enterocolitis - seen on CT  # Leukocytosis - resolved  # Nausea/Vomiting  # Lactic acidosis - resolved  # Hypokalemia - improving  CT abdomen: Diffuse thickening of the wall small and large bowel without significant dilatation.  Possible pelvic congestive syndrome.  Fatty infiltration of the liver  No BM, stool sample not obtained  Continue Flagyl and Rocephin -day 2  Blood cultures growing CoNS in 1 of 2 sets, suspect contaminant.  Will continue to monitor  Continue antiemetics  Discontinue D5 NS KCl, start NS infusion  Encouraging p.o. intake, currently tolerating liquid diet but low intake.    # Pelvic congestion syndrome  Pelvic ultrasound: No acute sonographic abnormality.  IUD visualized within uterus.  - Will follow-up with PCP or OB/GYN      Medical Decision Making  Number and Complexity of problems: 3 acute moderate complexity  Differential Diagnosis: As above    Conditions and Status        New to me, but states she is improving.    MDM Data  External documents reviewed: Reviewed  Cardiac tracing (EKG, telemetry) interpretation: Reviewed  Radiology interpretation: Reviewed  Labs reviewed: As above  Any  tests that were considered but not ordered: None     Decision rules/scores evaluated (example [id number], Wells, etc): None     Discussed with: Patient     Care Planning  Shared decision making: Patient apprised of current labs, vitals, imaging and treatment plan.  They are agreeable with proceeding with plans as discussed.   Code status and discussions: Full code    Disposition  Social Determinants of Health that impact treatment or disposition: None  I expect the patient to be discharged to home in 1-2 days.         Electronically signed by Liss Nelson MD, 07/31/25, 09:53 CDT.

## 2025-07-31 NOTE — PLAN OF CARE
Goal Outcome Evaluation:  Plan of Care Reviewed With: patient        Progress: no change  Outcome Evaluation: VSS. A&Ox4. Up ad josesito. C/o N/V, relief w/ prns. Diet upgraded to GI soft diet, not tolerating well, unable to keep food down at this time. IVFI. RA. SCDs. Resting comfortably. Safety maintained.

## 2025-08-01 VITALS
SYSTOLIC BLOOD PRESSURE: 130 MMHG | HEART RATE: 80 BPM | BODY MASS INDEX: 20.24 KG/M2 | OXYGEN SATURATION: 98 % | TEMPERATURE: 98.3 F | WEIGHT: 90 LBS | HEIGHT: 56 IN | RESPIRATION RATE: 16 BRPM | DIASTOLIC BLOOD PRESSURE: 92 MMHG

## 2025-08-01 LAB
ANION GAP SERPL CALCULATED.3IONS-SCNC: 13 MMOL/L (ref 5–15)
BASOPHILS # BLD AUTO: 0.03 10*3/MM3 (ref 0–0.2)
BASOPHILS NFR BLD AUTO: 0.3 % (ref 0–1.5)
BUN SERPL-MCNC: 5.7 MG/DL (ref 6–20)
BUN/CREAT SERPL: 12.1 (ref 7–25)
CALCIUM SPEC-SCNC: 9.3 MG/DL (ref 8.6–10.5)
CHLORIDE SERPL-SCNC: 99 MMOL/L (ref 98–107)
CO2 SERPL-SCNC: 24 MMOL/L (ref 22–29)
CREAT SERPL-MCNC: 0.47 MG/DL (ref 0.57–1)
DEPRECATED RDW RBC AUTO: 41.2 FL (ref 37–54)
EGFRCR SERPLBLD CKD-EPI 2021: 138.2 ML/MIN/1.73
EOSINOPHIL # BLD AUTO: 0.05 10*3/MM3 (ref 0–0.4)
EOSINOPHIL NFR BLD AUTO: 0.5 % (ref 0.3–6.2)
ERYTHROCYTE [DISTWIDTH] IN BLOOD BY AUTOMATED COUNT: 14.2 % (ref 12.3–15.4)
GLUCOSE SERPL-MCNC: 88 MG/DL (ref 65–99)
HBA1C MFR BLD: 5.4 % (ref 4.8–5.6)
HCT VFR BLD AUTO: 37.6 % (ref 34–46.6)
HGB BLD-MCNC: 12.3 G/DL (ref 12–15.9)
IMM GRANULOCYTES # BLD AUTO: 0.05 10*3/MM3 (ref 0–0.05)
IMM GRANULOCYTES NFR BLD AUTO: 0.5 % (ref 0–0.5)
LYMPHOCYTES # BLD AUTO: 2.17 10*3/MM3 (ref 0.7–3.1)
LYMPHOCYTES NFR BLD AUTO: 22.8 % (ref 19.6–45.3)
MAGNESIUM SERPL-MCNC: 2 MG/DL (ref 1.6–2.6)
MCH RBC QN AUTO: 26.4 PG (ref 26.6–33)
MCHC RBC AUTO-ENTMCNC: 32.7 G/DL (ref 31.5–35.7)
MCV RBC AUTO: 80.7 FL (ref 79–97)
MONOCYTES # BLD AUTO: 0.58 10*3/MM3 (ref 0.1–0.9)
MONOCYTES NFR BLD AUTO: 6.1 % (ref 5–12)
NEUTROPHILS NFR BLD AUTO: 6.62 10*3/MM3 (ref 1.7–7)
NEUTROPHILS NFR BLD AUTO: 69.8 % (ref 42.7–76)
NRBC BLD AUTO-RTO: 0 /100 WBC (ref 0–0.2)
PLATELET # BLD AUTO: 291 10*3/MM3 (ref 140–450)
PMV BLD AUTO: 10.1 FL (ref 6–12)
POTASSIUM SERPL-SCNC: 4.2 MMOL/L (ref 3.5–5.2)
RBC # BLD AUTO: 4.66 10*6/MM3 (ref 3.77–5.28)
SODIUM SERPL-SCNC: 136 MMOL/L (ref 136–145)
WBC NRBC COR # BLD AUTO: 9.5 10*3/MM3 (ref 3.4–10.8)

## 2025-08-01 PROCEDURE — 25010000002 ONDANSETRON PER 1 MG: Performed by: NURSE PRACTITIONER

## 2025-08-01 PROCEDURE — 83735 ASSAY OF MAGNESIUM: CPT | Performed by: FAMILY MEDICINE

## 2025-08-01 PROCEDURE — 80048 BASIC METABOLIC PNL TOTAL CA: CPT | Performed by: STUDENT IN AN ORGANIZED HEALTH CARE EDUCATION/TRAINING PROGRAM

## 2025-08-01 PROCEDURE — 85025 COMPLETE CBC W/AUTO DIFF WBC: CPT | Performed by: STUDENT IN AN ORGANIZED HEALTH CARE EDUCATION/TRAINING PROGRAM

## 2025-08-01 PROCEDURE — 83036 HEMOGLOBIN GLYCOSYLATED A1C: CPT | Performed by: STUDENT IN AN ORGANIZED HEALTH CARE EDUCATION/TRAINING PROGRAM

## 2025-08-01 PROCEDURE — 25810000003 SODIUM CHLORIDE 0.9 % SOLUTION: Performed by: STUDENT IN AN ORGANIZED HEALTH CARE EDUCATION/TRAINING PROGRAM

## 2025-08-01 PROCEDURE — 25010000002 PROCHLORPERAZINE 10 MG/2ML SOLUTION: Performed by: NURSE PRACTITIONER

## 2025-08-01 RX ADMIN — ONDANSETRON 4 MG: 2 INJECTION INTRAMUSCULAR; INTRAVENOUS at 01:01

## 2025-08-01 RX ADMIN — SODIUM CHLORIDE 75 ML/HR: 9 INJECTION, SOLUTION INTRAVENOUS at 01:02

## 2025-08-01 RX ADMIN — Medication 10 ML: at 08:36

## 2025-08-01 RX ADMIN — PROCHLORPERAZINE EDISYLATE 5 MG: 5 INJECTION, SOLUTION INTRAMUSCULAR; INTRAVENOUS at 02:12

## 2025-08-01 RX ADMIN — PROCHLORPERAZINE EDISYLATE 5 MG: 5 INJECTION, SOLUTION INTRAMUSCULAR; INTRAVENOUS at 08:36

## 2025-08-01 RX ADMIN — ONDANSETRON 4 MG: 2 INJECTION INTRAMUSCULAR; INTRAVENOUS at 11:48

## 2025-08-01 NOTE — PROGRESS NOTES
[facility] Medicine Services  INPATIENT PROGRESS NOTE    Patient Name: Rosmery Padilla  Date of Admission: 7/29/2025  Today's Date: 08/01/25  Length of Stay: 3  Primary Care Physician: Shirin Nuno APRN    Subjective   Chief Complaint: Nausea     No acute events overnight.    She has had some episodes of vomiting overnight, this morning she tolerated her breakfast and feels good and no nausea.   We discussed her marijuana use. She reported smoking about 3 times a day to cope with anxiety. We discussed the possibility of cannabinoid hyperemesis syndrome.     Review of Systems   Gastrointestinal:  Positive for vomiting.      All pertinent negatives and positives are as above. All other systems have been reviewed and are negative unless otherwise stated.     Objective    Temp:  [98 °F (36.7 °C)-98.5 °F (36.9 °C)] 98 °F (36.7 °C)  Heart Rate:  [65-78] 78  Resp:  [16] 16  BP: (119-132)/(81-92) 132/92  Physical Exam  GEN: Awake, alert, interactive, in NAD on room air  HEENT: Atraumatic, EOMI, Anicteric  Lungs: CTAB, no wheezing/rales/rhonchi  Heart: RRR, +S1/s2, no rub  ABD: soft, nondistended, +BS, tenderness on left quadrants  Extremities: atraumatic, no cyanosis, no edema  Skin: no rashes or lesions  Neuro: AAOx3, no focal deficits  Psych: normal mood & affect      Results Review:  I have reviewed the labs, radiology results, and diagnostic studies.    Laboratory Data:   Results from last 7 days   Lab Units 08/01/25  0555 07/31/25  0519 07/30/25  0517   WBC 10*3/mm3 9.50 8.28 13.40*   HEMOGLOBIN g/dL 12.3 11.6* 10.8*   HEMATOCRIT % 37.6 35.3 32.6*   PLATELETS 10*3/mm3 291 262 263        Results from last 7 days   Lab Units 08/01/25  0555 07/31/25  1639 07/31/25  0520 07/30/25  0517 07/29/25  0755 07/29/25  0618   SODIUM mmol/L 136  --  135* 139   < > 142   SODIUM, VENOUS   --   --   --   --    < >  --    POTASSIUM mmol/L 4.2 3.8 3.6 3.3*   < > 3.7   POTASSIUM, VENOUS   --   --    --   --    < >  --    CHLORIDE mmol/L 99  --  101 104   < > 106   CO2 mmol/L 24.0  --  23.0 22.0   < > 21.0*   BUN mg/dL 5.7*  --  3.6* 5.9*   < > 10.5   CREATININE mg/dL 0.47*  --  0.35* 0.49*   < > 0.56*   CALCIUM mg/dL 9.3  --  8.6 8.8   < > 9.6   BILIRUBIN mg/dL  --   --   --   --   --  0.3   ALK PHOS U/L  --   --   --   --   --  51   ALT (SGPT) U/L  --   --   --   --   --  12   AST (SGOT) U/L  --   --   --   --   --  18   GLUCOSE mg/dL 88  --  114* 82   < > 195*   GLUCOSE, ARTERIAL   --   --   --   --    < >  --     < > = values in this interval not displayed.       Culture Data:   @[facility]@    Radiology Data:   Imaging Results (Last 24 Hours)       ** No results found for the last 24 hours. **            I have reviewed the patient's current medications.     Assessment/Plan   Assessment  Active Hospital Problems    Diagnosis     **Enterocolitis     Metabolic acidosis     Intractable nausea and vomiting     Cannabinoid hyperemesis syndrome        Treatment Plan    # Nausea/Vomiting - improving   # Enterocolitis - seen on CT  # Leukocytosis - resolved  # Lactic acidosis - resolved  # Hypokalemia - improving  CT abdomen: Diffuse thickening of the wall small and large bowel without significant dilatation.  Possible pelvic congestive syndrome.  Fatty infiltration of the liver  No BM, stool sample not obtained  Discontinued antibiotics  Blood cultures growing CoNS in 1 of 2 sets, suspect contaminant.  Will continue to monitor  Continue antiemetics  NS infusion  Encouraging p.o. intake, currently tolerating breakfast this morning.   Discussed possibility of cannabinoid hyperemesis syndrome    # Pelvic congestion syndrome  Pelvic ultrasound: No acute sonographic abnormality.  IUD visualized within uterus.  - Will follow-up with PCP or OB/GYN      Medical Decision Making  Number and Complexity of problems: 3 acute moderate complexity  Differential Diagnosis: As above    Conditions and Status        Improving.    MDM  Data  External documents reviewed: Reviewed  Cardiac tracing (EKG, telemetry) interpretation: Reviewed  Radiology interpretation: Reviewed  Labs reviewed: As above  Any tests that were considered but not ordered: None     Decision rules/scores evaluated (example [id number], Wells, etc): None     Discussed with: Patient     Care Planning  Shared decision making: Patient apprised of current labs, vitals, imaging and treatment plan.  They are agreeable with proceeding with plans as discussed.   Code status and discussions: Full code    Disposition  Social Determinants of Health that impact treatment or disposition: None  I expect the patient to be discharged to home in 1 day.         Electronically signed by Liss Nelson MD, 08/01/25, 12:08 CDT.

## 2025-08-01 NOTE — PLAN OF CARE
Goal Outcome Evaluation:  Plan of Care Reviewed With: patient        Progress: improving  Outcome Evaluation: Pt A&Ox4. Up ad josesito. No vomiting this shift. Nausea improving. VSS. Plans to DC home in progress. Call light in reach. Safety maintained.

## 2025-08-01 NOTE — DISCHARGE SUMMARY
[facility] Services  DISCHARGE SUMMARY       Date of Admission: 7/29/2025  Date of Discharge:  8/1/2025  Primary Care Physician: Shirin Nuno APRN    Presenting Problem/History of Present Illness:  Abdominal pain, nausea and vomiting    Final Discharge Diagnoses:  Active Hospital Problems    Diagnosis     **Cannabinoid hyperemesis syndrome     Metabolic acidosis     Intractable nausea and vomiting     Enterocolitis        Consults: None    Procedures Performed: None    Pertinent Test Results:   No results found for this or any previous visit.      Imaging Results (All)       Procedure Component Value Units Date/Time    US Abdomen Limited [[id number]] Collected: 07/30/25 0802     Updated: 07/30/25 0807    Narrative:      EXAMINATION: US ABDOMEN LIMITED- 7/30/2025 8:02 AM     HISTORY: abdominal pain, n/v/d, wbc 23,000; poorly visualized appendix,  CT nonspecific; K52.9-Noninfective gastroenteritis and colitis,  unspecified; K52.9-Noninfective gastroenteritis and colitis,  unspecified; E86.0-Dehydration; R73.9-Hyperglycemia, unspecified;  E87.20-Acidosis, unspecified.     Images are stored in PACS per institutional protocols and regulatory  requirements.     REPORT: Targeted ultrasound of the right lower quadrant was performed in  the region of pain.     COMPARISON: CT abdomen pelvis 7/29/2025, ultrasound pelvis 7/29/2025.     No mass or fluid collection is identified, the appendix is not clearly  demonstrated. There are some fluid-filled bowel loops in this region.       Impression:      Nonvisualization of the appendix. No acute sonographic  abnormality.     This report was signed and finalized on 7/30/2025 8:04 AM by Dr. Tyrone Quiroz MD.       US Pelvis Complete [[id number]] Collected: 07/29/25 1530     Updated: 07/29/25 1537    Narrative:      EXAMINATION: US PELVIS COMPLETE- 7/29/2025 3:30 PM     HISTORY: abnormal CT uterus and ovaries with vascular  abnormalities and  free fluid in pelvis; K52.9-Noninfective gastroenteritis and colitis,  unspecified; K52.9-Noninfective gastroenteritis and colitis,  unspecified; E86.0-Dehydration; R73.9-Hyperglycemia, unspecified;  E87.20-Acidosis, unspecified.     Images are stored in PACS per institutional protocols and regulatory  requirements.     REPORT: Sonographic images of the pelvis were obtained transabdominally.  Images were obtained in the transverse and longitudinal dimensions.     COMPARISON: CT abdomen pelvis 7/29/2025.     The technologist reports that the examination is technically difficult  due to the patient's constant motion, excessive bowel gas, nausea and  vomiting.     The uterus measures 7.5 x 3.8 x 4.9 cm and appears anteverted. There is  shadowing from the IUD within the endometrial cavity. No endometrial  thickening is identified.     The right ovary measures 3.5 x 2.4 x 2.1 cm and appears unremarkable.  Color and spectral Doppler images demonstrate arterial and venous blood  flow within the right ovary.     The left ovary measures 4.1 x 2.0 x 1.9 cm and appears normal. Color and  spectral Doppler images demonstrate arterial and venous blood flow  within the left ovary. No free fluid is identified.       Impression:      No acute sonographic pelvic abnormality, findings suggestive  of pelvic vascular congestion syndrome are better demonstrated on CT  today. Normal appearance of the uterus and ovaries, an IUD is present  within the uterus.     This report was signed and finalized on 7/29/2025 3:34 PM by Dr. Tyrone Quiroz MD.       CT Abdomen Pelvis With Contrast [[id number]] Collected: 07/29/25 0721     Updated: 07/29/25 0737    Narrative:      EXAMINATION: CT ABDOMEN PELVIS W CONTRAST-        HISTORY:abd pain, n/v; K52.9-Noninfective gastroenteritis and colitis,  unspecified     In order to have a CT radiation dose as low as reasonably achievable  Automated Exposure Control was utilized for  adjustment of the mA and/or  KV according to patient size.     Total DLP =204.84 mGy.cm     The CT scan of the abdomen and pelvis should performed after intravenous  contrast enhancement.     Images are acquired in axial plane and subsequent reconstruction in  coronal and sagittal planes.     There is no previous similar study for comparison.     The lung bases included in the study are unremarkable.     There is fatty infiltration of the liver. No focal intrinsic  abnormality.     The spleen is normal. A small splenule is seen between the inferior  margin of the spleen and the left kidney.     No radiopaque gallstones and a small contracted gallbladder.     Pancreas is normal.     The adrenal glands bilaterally are normal.     The kidneys bilaterally are normal. No mass. No calculi. No  hydronephrosis. The ureters are not well visualized due to paucity of  the retroperitoneal fat. Urinary bladder is poorly distended. No  intrinsic abnormality.     Moderate fullness of the uterus is seen which is anteverted compressing  on the urinary bladder. An IUD in place. There are follicular changes in  both ovaries. A dominant follicle in the left ovary measures 2 cm in  diameter. There are significantly dilated tortuous pelvic veins around  the uterus and adnexa, left more than the right with opacification of  significantly dilated ovarian/gonadal veins left more than the right. No  filling defects. There is a small amount of free fluid in the pelvis.     The stomach is full of ingested material. There is diffuse thickening of  the wall/mucosa of the entire small bowel with enhancement. No  significant dilatation or air-fluid levels. The appendix not well  visualized or evaluated in this study. There is moderate thickening of  the wall of the colon which may partly be due to decompression.  Possibility of acute colitis not excluded. The peritoneal fat  infiltration is not optimally visualized or evaluated due to  significant  opacity of the peritoneal and retroperitoneal fat.     A normal abdominal aorta and iliac arteries.     No evidence of abdominal or pelvic lymphadenopathy.     Images reviewed in bone window show no acute bony abnormality.       Impression:      1. A limited study due to lack of oral contrast enhancement and is  significant opacity of the peritoneal and retroperitoneal fat.  2. Moderate diffuse thickening of the wall of the small and large bowel  without significant dilatation or air-fluid levels. This may represent  acute enterocolitis.  3. The appendix is not visualized or evaluated in this study. If  clinically warranted, a follow-up examination after oral contrast  enhancement may be obtained.  4. Moderate fullness of the uterus, prominent and tortuous pelvic veins  around the uterus and adnexa, significantly dilated gonadal/ovarian  veins and a small free fluid in the pelvis may suggest pelvic congestion  syndrome.  5. Fatty infiltration of the liver.                                      This report was signed and finalized on 7/29/2025 7:33 AM by Dr. Sejal Pierre MD.             LAB RESULTS:      Lab 08/01/25  0555 07/31/25  0519 07/30/25  0517 07/29/25  1549 07/29/25  1246 07/29/25  0920 07/29/25  0809 07/29/25  0755 07/29/25  0618   WBC 9.50 8.28 13.40*  --   --   --   --   --  23.51*   HEMOGLOBIN 12.3 11.6* 10.8*  --   --   --   --   --  12.0   HEMATOCRIT 37.6 35.3 32.6*  --   --   --   --   --  37.4   PLATELETS 291 262 263  --   --   --   --   --  327   NEUTROS ABS 6.62 5.82 9.90*  --   --   --   --   --  18.27*   IMMATURE GRANS (ABS) 0.05 0.03 0.03  --   --   --   --   --  0.10*   LYMPHS ABS 2.17 1.81 2.69  --   --   --   --   --  3.89*   MONOS ABS 0.58 0.58 0.74  --   --   --   --   --  0.98*   EOS ABS 0.05 0.03 0.01  --   --   --   --   --  0.21   MCV 80.7 80.0 79.9  --   --   --   --   --  82.0   CRP  --   --   --   --   --   --   --  <0.30  --    PROCALCITONIN  --   --   --   --    --   --   --  0.03  --    LACTATE  --   --   --  1.7 2.2* 2.8* 2.6*  --   --    LDH  --   --   --   --   --   --   --  221*  --    PROTIME  --   --   --   --   --   --   --   --  15.6*         Lab 08/01/25  0555 07/31/25  1639 07/31/25  0520 07/30/25  0517 07/29/25  0809 07/29/25  0755 07/29/25 0618   SODIUM 136  --  135* 139  --  139 142   SODIUM, VENOUS  --   --   --   --  143  --   --    POTASSIUM 4.2 3.8 3.6 3.3*  --  3.3* 3.7   POTASSIUM, VENOUS  --   --   --   --  3.2*  --   --    CHLORIDE 99  --  101 104  --  104 106   CO2 24.0  --  23.0 22.0  --  21.0* 21.0*   ANION GAP 13.0  --  11.0 13.0  --  14.0 15.0   BUN 5.7*  --  3.6* 5.9*  --  8.8 10.5   CREATININE 0.47*  --  0.35* 0.49*  --  0.54* 0.56*   EGFR 138.2  --  148.4 136.9  --  133.7 132.5   GLUCOSE 88  --  114* 82  --  189* 195*   GLUCOSE, ARTERIAL  --   --   --   --  183*  --   --    CALCIUM 9.3  --  8.6 8.8  --  8.8 9.6   MAGNESIUM 2.0  --  1.9 1.9  --   --  1.9   HEMOGLOBIN A1C 5.40  --   --   --   --   --   --          Lab 07/29/25 0618   TOTAL PROTEIN 7.5   ALBUMIN 4.7   GLOBULIN 2.8   ALT (SGPT) 12   AST (SGOT) 18   BILIRUBIN 0.3   ALK PHOS 51         Lab 07/29/25 0618   PROTIME 15.6*   INR 1.18*                 Lab 07/29/25  0809   CARBOXYHEMOGLOBIN (VENOUS) 2.8     Brief Urine Lab Results  (Last result in the past 365 days)        Color   Clarity   Blood   Leuk Est   Nitrite   Protein   CREAT   Urine HCG        07/29/25 1007 Yellow   Clear   Negative   Negative   Negative   Trace                 Microbiology Results (last 10 days)       Procedure Component Value - Date/Time    Blood Culture With LONI - Blood, Arm, Left [[id number]]  (Normal) Collected: 07/30/25 1508    Lab Status: Preliminary result Specimen: Blood from Arm, Left Updated: 07/31/25 1545     Blood Culture No growth at 24 hours    Blood Culture - Blood, Arm, Left [[id number]]  (Abnormal) Collected: 07/29/25 0920    Lab Status: Final result Specimen: Blood from Arm, Left Updated:  07/31/25 0548     Blood Culture Staphylococcus, coagulase negative     Isolated from Aerobic Bottle     Gram Stain Aerobic Bottle Gram positive cocci    Narrative:      Probable contaminant requires clinical correlation, susceptibility not performed unless requested by physician.      Blood Culture ID, PCR - Blood, Arm, Left [[id number]]  (Abnormal) Collected: 07/29/25 0920    Lab Status: Final result Specimen: Blood from Arm, Left Updated: 07/30/25 1230     BCID, PCR Staph spp, not aureus or lugdunensis. Identification by BCID2 PCR.     BOTTLE TYPE Aerobic Bottle    Blood Culture - Blood, Arm, Right [[id number]]  (Normal) Collected: 07/29/25 0853    Lab Status: Preliminary result Specimen: Blood from Arm, Right Updated: 08/01/25 0915     Blood Culture No growth at 3 days            Hospital Course:     Rosmery Padilla is a 20 y.o.  female with PMH of anxiety and self-medicating with marijuana who presented on 7/29 with complaints of mild abdominal pain and nausea and vomiting and inability to keep food down.  On CT, enterocolitis was suspected.  She has not had any bowel movements while admitted therefore no stool sample was collected.  Nausea and vomiting were initially intractable and slowly improved with diet and antiemetics.  Discussed importance of cessation of marijuana with the patient and explained that it may be a cause for cyclic vomiting.  She also had leukocytosis, lactic acidosis and hypokalemia which were all corrected.  During the imaging there was a incidental finding of pelvic congestion syndrome for which she also received a pelvic ultrasound which did not show any acute abnormalities and showed an IUD that was correctly placed within the uterus.  Patient was informed to follow-up with her OB/GYN on these findings.    # Nausea/Vomiting -improved  # Enterocolitis - seen on CT  # Leukocytosis - resolved  # Lactic acidosis - resolved  # Hypokalemia - improving  CT abdomen: Diffuse thickening of the  "wall small and large bowel without significant dilatation.  Possible pelvic congestive syndrome.  Fatty infiltration of the liver  No BM, stool sample not obtained  Discontinued antibiotics  Blood cultures growing CoNS in 1 of 2 sets, suspect contaminant.  Will continue to monitor  Continue antiemetics  Received IV fluids  Encouraging p.o. intake, tolerated solid breakfast and lunch today  Discussed possibility of cannabinoid hyperemesis syndrome and encouraged cessation     # Pelvic congestion syndrome  Pelvic ultrasound: No acute sonographic abnormality.  IUD visualized within uterus.  - Will follow-up with PCP or OB/GYN    Patient has reached the maximum benefit of hospitalization and is stable for discharge.    Physical Exam on Discharge:  /92 (BP Location: Left arm, Patient Position: Sitting)   Pulse 80   Temp 98.3 °F (36.8 °C) (Oral)   Resp 16   Ht 142.2 cm (56\")   Wt 40.8 kg (90 lb)   SpO2 98%   BMI 20.18 kg/m²   Physical Exam  GEN: Awake, alert, interactive, in NAD on room air  HEENT: Atraumatic, EOMI, Anicteric  Lungs: CTAB, no wheezing/rales/rhonchi  Heart: RRR, +S1/s2, no rub  ABD: soft, nondistended, +BS, tenderness on left quadrants  Extremities: atraumatic, no cyanosis, no edema  Skin: no rashes or lesions  Neuro: AAOx3, no focal deficits  Psych: normal mood & affect    Condition on Discharge: Stable    Discharge Disposition:  Home or Self Care    Discharge Medications:     Discharge Medications        New Medications        Instructions Start Date   famotidine 20 MG tablet  Commonly known as: PEPCID   20 mg, Oral, 2 Times Daily      ondansetron ODT 8 MG disintegrating tablet  Commonly known as: ZOFRAN-ODT   8 mg, Translingual, Every 8 Hours PRN             Continue These Medications        Instructions Start Date   Mirena (52 MG) 20 MCG/DAY intrauterine device IUD  Generic drug: Levonorgestrel   1 each, Intrauterine, Once               Discharge Diet:   Diet Instructions       Diet: " Gastrointestinal Diets; Fiber-Restricted; Regular (IDDSI 7); Thin (IDDSI 0)      Discharge Diet: Gastrointestinal Diets    Gastrointestinal Diet: Fiber-Restricted    Texture: Regular (IDDSI 7)    Fluid Consistency: Thin (IDDSI 0)            Activity at Discharge: Regular, as tolerated    Follow-up Appointments:   No future appointments.    Test Results Pending at Discharge: None    Electronically signed by Liss Nelson MD, 08/01/25, 14:23 CDT.    Time: 32 minutes.

## 2025-08-01 NOTE — PLAN OF CARE
Goal Outcome Evaluation:  Plan of Care Reviewed With: patient        Progress: no change  Outcome Evaluation: AOx4, VSS, up ad josesito. Mult episodes of N/V, PRNs given. IV fluids running as ordered. Significant other at bedside, call light within reach.

## 2025-08-02 ENCOUNTER — NURSE TRIAGE (OUTPATIENT)
Dept: CALL CENTER | Facility: HOSPITAL | Age: 20
End: 2025-08-02
Payer: COMMERCIAL

## 2025-08-02 ENCOUNTER — HOSPITAL ENCOUNTER (EMERGENCY)
Facility: HOSPITAL | Age: 20
Discharge: HOME | End: 2025-08-02
Payer: COMMERCIAL

## 2025-08-02 VITALS
TEMPERATURE: 97.9 F | BODY MASS INDEX: 19.12 KG/M2 | OXYGEN SATURATION: 97 % | DIASTOLIC BLOOD PRESSURE: 65 MMHG | HEART RATE: 84 BPM | SYSTOLIC BLOOD PRESSURE: 109 MMHG | RESPIRATION RATE: 18 BRPM | WEIGHT: 85 LBS | HEIGHT: 56 IN

## 2025-08-02 DIAGNOSIS — R11.2 NAUSEA AND VOMITING, UNSPECIFIED VOMITING TYPE: ICD-10-CM

## 2025-08-02 DIAGNOSIS — F12.90 CANNABINOID HYPEREMESIS SYNDROME: Primary | ICD-10-CM

## 2025-08-02 DIAGNOSIS — R11.2 CANNABINOID HYPEREMESIS SYNDROME: Primary | ICD-10-CM

## 2025-08-02 LAB
ALBUMIN SERPL-MCNC: 5 G/DL (ref 3.5–5.2)
ALBUMIN/GLOB SERPL: 1.6 G/DL
ALP SERPL-CCNC: 52 U/L (ref 39–117)
ALT SERPL W P-5'-P-CCNC: 29 U/L (ref 1–33)
AMPHET+METHAMPHET UR QL: NEGATIVE
AMPHETAMINES UR QL: NEGATIVE
ANION GAP SERPL CALCULATED.3IONS-SCNC: 20 MMOL/L (ref 5–15)
AST SERPL-CCNC: 43 U/L (ref 1–32)
ATMOSPHERIC PRESS: 757 MMHG
BACTERIA UR QL AUTO: ABNORMAL /HPF
BARBITURATES UR QL SCN: NEGATIVE
BASE EXCESS BLDV CALC-SCNC: -2.2 MMOL/L (ref 0–2)
BASOPHILS # BLD AUTO: 0.04 10*3/MM3 (ref 0–0.2)
BASOPHILS NFR BLD AUTO: 0.4 % (ref 0–1.5)
BDY SITE: ABNORMAL
BENZODIAZ UR QL SCN: NEGATIVE
BILIRUB SERPL-MCNC: 0.8 MG/DL (ref 0–1.2)
BILIRUB UR QL STRIP: NEGATIVE
BODY TEMPERATURE: 37
BUN SERPL-MCNC: 16.4 MG/DL (ref 6–20)
BUN/CREAT SERPL: 31.5 (ref 7–25)
BUPRENORPHINE SERPL-MCNC: NEGATIVE NG/ML
CALCIUM SPEC-SCNC: 10 MG/DL (ref 8.6–10.5)
CANNABINOIDS SERPL QL: POSITIVE
CHLORIDE SERPL-SCNC: 94 MMOL/L (ref 98–107)
CLARITY UR: CLEAR
CO2 SERPL-SCNC: 20 MMOL/L (ref 22–29)
COCAINE UR QL: NEGATIVE
COLOR UR: YELLOW
CREAT SERPL-MCNC: 0.52 MG/DL (ref 0.57–1)
DEPRECATED RDW RBC AUTO: 40.9 FL (ref 37–54)
EGFRCR SERPLBLD CKD-EPI 2021: 134.9 ML/MIN/1.73
EOSINOPHIL # BLD AUTO: 0.06 10*3/MM3 (ref 0–0.4)
EOSINOPHIL NFR BLD AUTO: 0.7 % (ref 0.3–6.2)
ERYTHROCYTE [DISTWIDTH] IN BLOOD BY AUTOMATED COUNT: 14.6 % (ref 12.3–15.4)
FENTANYL UR-MCNC: NEGATIVE NG/ML
GLOBULIN UR ELPH-MCNC: 3.1 GM/DL
GLUCOSE SERPL-MCNC: 81 MG/DL (ref 65–99)
GLUCOSE UR STRIP-MCNC: NEGATIVE MG/DL
HCG SERPL QL: NEGATIVE
HCO3 BLDV-SCNC: 22.4 MMOL/L (ref 22–28)
HCT VFR BLD AUTO: 42.7 % (ref 34–46.6)
HGB BLD-MCNC: 14.2 G/DL (ref 12–15.9)
HGB UR QL STRIP.AUTO: NEGATIVE
HOLD SPECIMEN: NORMAL
HYALINE CASTS UR QL AUTO: ABNORMAL /LPF
IMM GRANULOCYTES # BLD AUTO: 0.04 10*3/MM3 (ref 0–0.05)
IMM GRANULOCYTES NFR BLD AUTO: 0.4 % (ref 0–0.5)
KETONES UR QL STRIP: ABNORMAL
LEUKOCYTE ESTERASE UR QL STRIP.AUTO: NEGATIVE
LIPASE SERPL-CCNC: 24 U/L (ref 13–60)
LYMPHOCYTES # BLD AUTO: 2.39 10*3/MM3 (ref 0.7–3.1)
LYMPHOCYTES NFR BLD AUTO: 26 % (ref 19.6–45.3)
Lab: ABNORMAL
MAGNESIUM SERPL-MCNC: 2 MG/DL (ref 1.6–2.6)
MCH RBC QN AUTO: 26.3 PG (ref 26.6–33)
MCHC RBC AUTO-ENTMCNC: 33.3 G/DL (ref 31.5–35.7)
MCV RBC AUTO: 79.2 FL (ref 79–97)
METHADONE UR QL SCN: NEGATIVE
MODALITY: ABNORMAL
MONOCYTES # BLD AUTO: 0.44 10*3/MM3 (ref 0.1–0.9)
MONOCYTES NFR BLD AUTO: 4.8 % (ref 5–12)
MUCOUS THREADS URNS QL MICRO: ABNORMAL /HPF
NEUTROPHILS NFR BLD AUTO: 6.23 10*3/MM3 (ref 1.7–7)
NEUTROPHILS NFR BLD AUTO: 67.7 % (ref 42.7–76)
NITRITE UR QL STRIP: NEGATIVE
NRBC BLD AUTO-RTO: 0 /100 WBC (ref 0–0.2)
OPIATES UR QL: NEGATIVE
OXYCODONE UR QL SCN: NEGATIVE
PCO2 BLDV: 36.9 MM HG (ref 41–51)
PCP UR QL SCN: NEGATIVE
PH BLDV: 7.39 PH UNITS (ref 7.32–7.42)
PH UR STRIP.AUTO: 6 [PH] (ref 5–8)
PLATELET # BLD AUTO: 382 10*3/MM3 (ref 140–450)
PMV BLD AUTO: 10.1 FL (ref 6–12)
PO2 BLDV: 58.2 MM HG (ref 27–53)
POTASSIUM SERPL-SCNC: 4.3 MMOL/L (ref 3.5–5.2)
PROT SERPL-MCNC: 8.1 G/DL (ref 6–8.5)
PROT UR QL STRIP: ABNORMAL
RBC # BLD AUTO: 5.39 10*6/MM3 (ref 3.77–5.28)
RBC # UR STRIP: ABNORMAL /HPF
REF LAB TEST METHOD: ABNORMAL
SAO2 % BLDCOV: 90.4 % (ref 45–75)
SODIUM SERPL-SCNC: 134 MMOL/L (ref 136–145)
SP GR UR STRIP: >=1.03 (ref 1–1.03)
SQUAMOUS #/AREA URNS HPF: ABNORMAL /HPF
TRICYCLICS UR QL SCN: NEGATIVE
UROBILINOGEN UR QL STRIP: ABNORMAL
VENTILATOR MODE: ABNORMAL
WBC # UR STRIP: ABNORMAL /HPF
WBC NRBC COR # BLD AUTO: 9.2 10*3/MM3 (ref 3.4–10.8)
WHOLE BLOOD HOLD COAG: NORMAL
WHOLE BLOOD HOLD SPECIMEN: NORMAL

## 2025-08-02 PROCEDURE — 99283 EMERGENCY DEPT VISIT LOW MDM: CPT

## 2025-08-02 PROCEDURE — 84703 CHORIONIC GONADOTROPIN ASSAY: CPT

## 2025-08-02 PROCEDURE — 96374 THER/PROPH/DIAG INJ IV PUSH: CPT

## 2025-08-02 PROCEDURE — 25010000002 PROCHLORPERAZINE 10 MG/2ML SOLUTION

## 2025-08-02 PROCEDURE — 25010000002 ONDANSETRON PER 1 MG

## 2025-08-02 PROCEDURE — 83735 ASSAY OF MAGNESIUM: CPT

## 2025-08-02 PROCEDURE — 80307 DRUG TEST PRSMV CHEM ANLYZR: CPT

## 2025-08-02 PROCEDURE — 81001 URINALYSIS AUTO W/SCOPE: CPT

## 2025-08-02 PROCEDURE — 83690 ASSAY OF LIPASE: CPT

## 2025-08-02 PROCEDURE — 96375 TX/PRO/DX INJ NEW DRUG ADDON: CPT

## 2025-08-02 PROCEDURE — 80053 COMPREHEN METABOLIC PANEL: CPT

## 2025-08-02 PROCEDURE — 25810000003 SODIUM CHLORIDE 0.9 % SOLUTION

## 2025-08-02 PROCEDURE — 85025 COMPLETE CBC W/AUTO DIFF WBC: CPT

## 2025-08-02 PROCEDURE — 82805 BLOOD GASES W/O2 SATURATION: CPT

## 2025-08-02 RX ORDER — ONDANSETRON 2 MG/ML
4 INJECTION INTRAMUSCULAR; INTRAVENOUS ONCE
Status: COMPLETED | OUTPATIENT
Start: 2025-08-02 | End: 2025-08-02

## 2025-08-02 RX ORDER — PROCHLORPERAZINE EDISYLATE 5 MG/ML
10 INJECTION INTRAMUSCULAR; INTRAVENOUS ONCE
Status: COMPLETED | OUTPATIENT
Start: 2025-08-02 | End: 2025-08-02

## 2025-08-02 RX ORDER — PROCHLORPERAZINE MALEATE 5 MG/1
5 TABLET ORAL EVERY 6 HOURS PRN
Qty: 15 TABLET | Refills: 0 | Status: SHIPPED | OUTPATIENT
Start: 2025-08-02

## 2025-08-02 RX ORDER — PROCHLORPERAZINE MALEATE 5 MG/1
5 TABLET ORAL EVERY 6 HOURS PRN
Qty: 15 TABLET | Refills: 0 | Status: SHIPPED | OUTPATIENT
Start: 2025-08-02 | End: 2025-08-02

## 2025-08-02 RX ORDER — SODIUM CHLORIDE 0.9 % (FLUSH) 0.9 %
10 SYRINGE (ML) INJECTION AS NEEDED
Status: DISCONTINUED | OUTPATIENT
Start: 2025-08-02 | End: 2025-08-02 | Stop reason: HOSPADM

## 2025-08-02 RX ADMIN — ONDANSETRON 4 MG: 2 INJECTION, SOLUTION INTRAMUSCULAR; INTRAVENOUS at 12:33

## 2025-08-02 RX ADMIN — PROCHLORPERAZINE EDISYLATE 10 MG: 5 INJECTION INTRAMUSCULAR; INTRAVENOUS at 14:07

## 2025-08-02 RX ADMIN — SODIUM CHLORIDE 1000 ML: 9 INJECTION, SOLUTION INTRAVENOUS at 12:32

## 2025-08-02 NOTE — ED PROVIDER NOTES
Subjective   History of Present Illness  Patient is a 20-year-old female who presents emerged department today for complaint of abdominal pain and vomiting.  Patient states that she was recently discharged yesterday after she was admitted for 4 days for IV fluids and antiemetics.  She states that she was told that it was due to hyperemesis cannabinoid.  She states that she does not think that this is what is causing her issues.  She states that she did get discharged yesterday and she went home last night and she did hit the blunt 1 time and hit the vape and it made her vomit immediately.  She states that she has had 13-14 episodes of vomiting since then.  She states that she is just not able to handle the marijuana.  She states that she has had this increasing abdominal pain over the night and came in today to be evaluated further.  Denies any fever, chills, shortness of breath, chest pain, and any other symptoms.  Past medical history of ADHD, allergic, anxiety, asthma, depression, history of transfusion, and hyperemesis gravidarum.        Review of Systems   Gastrointestinal:  Positive for abdominal pain, nausea and vomiting.   All other systems reviewed and are negative.      Past Medical History:   Diagnosis Date    ADHD     Allergic     Anxiety     Asthma     Depression     History of transfusion     Hyperemesis gravidarum 2022       Allergies   Allergen Reactions    Blackberry [Rubus Fruticosus] Rash    Blue Dyes (Parenteral) Rash       Past Surgical History:   Procedure Laterality Date    D & C WITH SUCTION      DILATATION AND CURETTAGE N/A 07/01/2023    Procedure: DILATATION AND CURETTAGE;  Surgeon: Darryl Del Valle MD;  Location: [facility] OR;  Service: Obstetrics/Gynecology;  Laterality: N/A;    FOOT SURGERY         Family History   Problem Relation Age of Onset    Heart attack Father     Diabetes Father     Alcohol abuse Father     Arthritis Mother     Depression Mother     Dementia Mother      Hypertension Mother     Hypertension Maternal Grandmother     Diabetes Maternal Grandfather     Hypertension Maternal Grandfather     Heart disease Maternal Grandfather     Breast cancer Neg Hx     Ovarian cancer Neg Hx     Uterine cancer Neg Hx     Colon cancer Neg Hx     Melanoma Neg Hx        Social History     Socioeconomic History    Marital status: Single   Tobacco Use    Smoking status: Former     Current packs/day: 0.00     Types: Cigarettes     Quit date: 2020     Years since quittin.4    Smokeless tobacco: Never   Vaping Use    Vaping status: Every Day    Substances: Nicotine, THC    Devices: Disposable   Substance and Sexual Activity    Alcohol use: No    Drug use: Yes     Types: Marijuana     Comment: last use 24    Sexual activity: Yes     Partners: Male     Birth control/protection: I.U.D.           Objective   Physical Exam  Vitals and nursing note reviewed.   Constitutional:       General: She is not in acute distress.     Appearance: Normal appearance. She is not ill-appearing, toxic-appearing or diaphoretic.   HENT:      Head: Normocephalic and atraumatic.      Right Ear: Tympanic membrane, ear canal and external ear normal. There is no impacted cerumen.      Left Ear: Tympanic membrane, ear canal and external ear normal. There is no impacted cerumen.      Nose: Nose normal. No congestion or rhinorrhea.      Mouth/Throat:      Mouth: Mucous membranes are moist.      Pharynx: Oropharynx is clear. No oropharyngeal exudate or posterior oropharyngeal erythema.   Eyes:      Extraocular Movements: Extraocular movements intact.      Conjunctiva/sclera: Conjunctivae normal.      Pupils: Pupils are equal, round, and reactive to light.   Cardiovascular:      Rate and Rhythm: Normal rate and regular rhythm.      Pulses: Normal pulses.      Heart sounds: Normal heart sounds. No murmur heard.     No gallop.   Pulmonary:      Effort: Pulmonary effort is normal. No respiratory distress.      Breath  sounds: Normal breath sounds. No stridor. No wheezing, rhonchi or rales.   Chest:      Chest wall: No tenderness.   Abdominal:      General: Abdomen is flat. Bowel sounds are normal. There is no distension.      Palpations: Abdomen is soft. There is no mass.      Tenderness: There is generalized abdominal tenderness. There is no right CVA tenderness, left CVA tenderness, guarding or rebound.      Hernia: No hernia is present.      Comments: Abdomen soft, tender to palpation generalized, no significant suprapubic tenderness or epigastric tenderness noted on exam, there is no guarding or rebound, no CVA tenderness noted on exam, no masses or hernias are palpated.   Musculoskeletal:         General: No swelling, tenderness, deformity or signs of injury. Normal range of motion.      Cervical back: Normal range of motion and neck supple. No rigidity or tenderness.      Right lower leg: No edema.      Left lower leg: No edema.   Lymphadenopathy:      Cervical: No cervical adenopathy.   Skin:     General: Skin is warm and dry.      Capillary Refill: Capillary refill takes less than 2 seconds.      Coloration: Skin is not jaundiced or pale.      Findings: No bruising, erythema, lesion or rash.   Neurological:      General: No focal deficit present.      Mental Status: She is alert and oriented to person, place, and time. Mental status is at baseline.      GCS: GCS eye subscore is 4. GCS verbal subscore is 5. GCS motor subscore is 6.      Cranial Nerves: Cranial nerves 2-12 are intact. No cranial nerve deficit.      Sensory: Sensation is intact. No sensory deficit.      Motor: Motor function is intact. No weakness.      Coordination: Coordination is intact. Coordination normal.      Gait: Gait is intact. Gait normal.      Deep Tendon Reflexes: Reflexes are normal and symmetric. Reflexes normal.   Psychiatric:         Mood and Affect: Mood normal.         Behavior: Behavior normal.         Thought Content: Thought content  normal.         Judgment: Judgment normal.         Procedures           ED Course  ED Course as of 08/03/25 1746   Sat Aug 02, 2025   1226 CBC, CMP, hCG qualitative, lipase, magnesium, urinalysis, UDS, 4 mg IV Zofran, 1 L normal saline bolus ordered. [BS]   1258 No leukocytosis, hemoglobin is 14.2. [BS]   1305 hCG qualitative is negative, magnesium 2.0. [BS]   1319 CMP creatinine 0.52, sodium 134, CO2 20, anion gap of 20, lipase 24, protein and ketones noted in the urine, mucus, trace bacteria, skin cells, no white blood cells.  UDS shows positive for THC. [BS]   1321 Plan to p.o. challenge the patient. [BS]   1350 Spoke with Dr. Siegel, added VBG and 10 mg of compazine.  [BS]   1510 VBG pH of 7.39.  [BS]   1510 Patient states that she is feeling better, is not having abdominal pain anymore, she has not had any vomiting in the ER today. She drank a sprite and was able to hold it down. She feels much better with the compazine, and feels like she is ready for discharge at this time. Will discuss with Dr. Siegel again.  [BS]   1526 The patient feels better, and wants to go home now. I think she is stable to go home with follow up of PCP. We discussed importance of stopping smoking marijuana and see if that helps with her symptoms. I did send compazine into the pharmacy. Instructed her to go to the ER with any new or worsening symptoms. Instructed to follow up with PCP as well. Return to ER with any new or worsening symptoms.  [BS]      ED Course User Index  [BS] Guerda Hansen, APRN                                                       Medical Decision Making  Problems Addressed:  Cannabinoid hyperemesis syndrome: complicated acute illness or injury  Nausea and vomiting, unspecified vomiting type: complicated acute illness or injury    Amount and/or Complexity of Data Reviewed  Labs: ordered.    Risk  Prescription drug management.    Patient is a 20-year-old female who presents emerged department today for complaint  of abdominal pain and vomiting.  Patient states that she was recently discharged yesterday after she was admitted for 4 days for IV fluids and antiemetics.  She states that she was told that it was due to hyperemesis cannabinoid.  She states that she does not think that this is what is causing her issues.  She states that she did get discharged yesterday and she went home last night and she did hit the blunt 1 time and hit the vape and it made her vomit immediately.  She states that she has had 13-14 episodes of vomiting since then.  She states that she is just not able to handle the marijuana.  She states that she has had this increasing abdominal pain over the night and came in today to be evaluated further.  Denies any fever, chills, shortness of breath, chest pain, and any other symptoms.  Past medical history of ADHD, allergic, anxiety, asthma, depression, history of transfusion, and hyperemesis gravidarum.    Differential diagnoses include but are not limited to hyperemesis cannabinoid, electrolyte imbalance, gastroenteritis, colitis, appendicitis, and other etiologies.    Labs Reviewed   COMPREHENSIVE METABOLIC PANEL - Abnormal; Notable for the following components:       Result Value    Creatinine 0.52 (*)     Sodium 134 (*)     Chloride 94 (*)     CO2 20.0 (*)     AST (SGOT) 43 (*)     BUN/Creatinine Ratio 31.5 (*)     Anion Gap 20.0 (*)     All other components within normal limits    Narrative:     GFR Categories in Chronic Kidney Disease (CKD)              GFR Category          GFR (mL/min/1.73)    Interpretation  G1                    90 or greater        Normal or high (1)  G2                    60-89                Mild decrease (1)  G3a                   45-59                Mild to moderate decrease  G3b                   30-44                Moderate to severe decrease  G4                    15-29                Severe decrease  G5                    14 or less           Kidney failure    (1)In the  absence of evidence of kidney disease, neither GFR category G1 or G2 fulfill the criteria for CKD.    eGFR calculation 2021 CKD-EPI creatinine equation, which does not include race as a factor   URINALYSIS W/ CULTURE IF INDICATED - Abnormal; Notable for the following components:    Ketones, UA >=160 mg/dL (4+) (*)     Protein,  mg/dL (2+) (*)     All other components within normal limits    Narrative:     In absence of clinical symptoms, the presence of pyuria, bacteria, and/or nitrites on the urinalysis result does not correlate with infection.   URINE DRUG SCREEN - Abnormal; Notable for the following components:    THC, Screen, Urine Positive (*)     All other components within normal limits    Narrative:     Cutoff For Drugs Screened:    Amphetamines               500 ng/ml  Barbiturates               200 ng/ml  Benzodiazepines            150 ng/ml  Cocaine                    150 ng/ml  Methadone                  200 ng/ml  Opiates                    100 ng/ml  Phencyclidine               25 ng/ml  THC                         50 ng/ml  Methamphetamine            500 ng/ml  Tricyclic Antidepressants  300 ng/ml  Oxycodone                  100 ng/ml  Buprenorphine               10 ng/ml    The normal value for all drugs tested is negative. This report includes unconfirmed screening results, with the cutoff values listed, to be used for medical treatment purposes only.  Unconfirmed results must not be used for non-medical purposes such as employment or legal testing.  Clinical consideration should be applied to any drug of abuse test, particularly when unconfirmed results are used.     CBC WITH AUTO DIFFERENTIAL - Abnormal; Notable for the following components:    RBC 5.39 (*)     MCH 26.3 (*)     Monocyte % 4.8 (*)     All other components within normal limits   URINALYSIS, MICROSCOPIC ONLY - Abnormal; Notable for the following components:    Bacteria, UA Trace (*)     Squamous Epithelial Cells, UA 7-12 (*)      Mucus, UA Moderate/2+ (*)     All other components within normal limits   BLOOD GAS, VENOUS - Abnormal; Notable for the following components:    pCO2, Venous 36.9 (*)     pO2, Venous 58.2 (*)     Base Excess, Venous -2.2 (*)     O2 Saturation, Venous 90.4 (*)     All other components within normal limits   LIPASE - Normal   HCG, SERUM, QUALITATIVE - Normal   MAGNESIUM - Normal   FENTANYL, URINE - Normal    Narrative:     Negative Threshold:      Fentanyl 5 ng/mL     The normal value for the drug tested is negative. This report includes final unconfirmed screening results to be used for medical treatment purposes only. Unconfirmed results must not be used for non-medical purposes such as employment or legal testing. Clinical consideration should be applied to any drug of abuse test, particularly when unconfirmed results are used.          RAINBOW DRAW    Narrative:     The following orders were created for panel order [facility] Draw.  Procedure                               Abnormality         Status                     ---------                               -----------         ------                     Green Top (Gel)[[id number]]                                  Final result               Lavender Top[[id number]]                                     Final result               Gold Top - SST[[id number]]                                   Final result               Leong Top[[id number]]                                         Final result               Light Blue Top[[id number]]                                   Final result                 Please view results for these tests on the individual orders.   BLOOD GAS, VENOUS   GREEN TOP   LAVENDER TOP   GOLD TOP - SST   GRAY TOP   LIGHT BLUE TOP   CBC AND DIFFERENTIAL    Narrative:     The following orders were created for panel order CBC & Differential.  Procedure                               Abnormality         Status                     ---------                                -----------         ------                     CBC Auto Differential[[id number]]        Abnormal            Final result                 Please view results for these tests on the individual orders.      Patient is a 20-year-old female who presents emerged department today for complaint of abdominal pain and vomiting.  Patient states that she was recently discharged yesterday after she was admitted for 4 days for IV fluids and antiemetics.  She states that she was told that it was due to hyperemesis cannabinoid.  She states that she does not think that this is what is causing her issues.  She states that she did get discharged yesterday and she went home last night and she did hit the blunt 1 time and hit the vape and it made her vomit immediately.  She states that she has had 13-14 episodes of vomiting since then.  She states that she is just not able to handle the marijuana.  She states that she has had this increasing abdominal pain over the night and came in today to be evaluated further.  On exam she is in no acute distress, normal appearing, not ill-appearing, nontoxic-appearing, nondiaphoretic.  Abdomen soft, tender to palpation generalized, no significant suprapubic tenderness or epigastric tenderness noted on exam, there is no guarding or rebound, no CVA tenderness noted on exam, no masses or hernias are palpated.  Otherwise exam is unremarkable. CBC, CMP, hCG qualitative, lipase, magnesium, urinalysis, UDS, 4 mg IV Zofran, 1 L normal saline bolus ordered.  No leukocytosis, hemoglobin is 14.2, hCG qualitative is negative, magnesium 2.0. CMP creatinine 0.52, sodium 134, CO2 20, anion gap of 20, lipase 24, protein and ketones noted in the urine, mucus, trace bacteria, skin cells, no white blood cells.  UDS shows positive for THC.  Spoke with Dr. Siegel, instructed me to add a VBG and 10 mg of IV Compazine.  VBG shows a pH of 7.39. Patient states that she is feeling better, is not having abdominal pain  anymore, she has not had any vomiting in the ER today. She drank a sprite and was able to hold it down. She feels much better with the compazine, and feels like she is ready for discharge at this time. Will discuss with Dr. Siegel again.  The patient feels better, and wants to go home now. I think she is stable to go home with follow up of PCP. We discussed importance of stopping smoking marijuana and see if that helps with her symptoms. I did send compazine into the pharmacy. Instructed her to go to the ER with any new or worsening symptoms. Instructed to follow up with PCP as well. Return to ER with any new or worsening symptoms.     Final diagnoses:   Cannabinoid hyperemesis syndrome   Nausea and vomiting, unspecified vomiting type       ED Disposition  ED Disposition       ED Disposition   Discharge    Condition   Stable    Comment   --               Shirin Nuno, APRN  [address] [facility] [address] [phone]    Schedule an appointment as soon as possible for a visit   follow up    [facility] EMERGENCY DEPARTMENT  [address] [place] [id number] [phone]    If symptoms worsen, As needed         Medication List        New Prescriptions      prochlorperazine 5 MG tablet  Commonly known as: COMPAZINE  Take 1 tablet by mouth Every 6 (Six) Hours As Needed for Nausea or Vomiting.               Where to Get Your Medications        These medications were sent to [facility]/pharmacy #[id number] - [place], [place] - [address] AT ACROSS FROM [address] - [phone] [facility] [phone]   [address] [facility] [address]      Phone: [phone]   prochlorperazine 5 MG tablet            Guerda Hansen, APRN  08/03/25 7916

## 2025-08-02 NOTE — PAYOR COMM NOTE
"DC HOME 8-1-25    Richard Aranda (20 y.o. Female)       Date of Birth   2005    Social Security Number       Address   [address] [id number]    Home Phone   [phone]    MRN   [id number]       [facility]   [facility]    Marital Status   Single                            Admission Date   7/29/2025    Admission Type   Emergency    Admitting Provider   Liss Nelson MD    Attending Provider       Department, Room/Bed   [facility] 3A, 338/1       Discharge Date   8/1/2025    Discharge Disposition   Home or Self Care    Discharge Destination                                 Attending Provider: (none)   Allergies: Blackberry [Rubus Fruticosus], Blue Dyes (Parenteral)    Isolation: None   Infection: None   Code Status: Prior    Ht: 142.2 cm (56\")   Wt: 40.8 kg (90 lb)    Admission Cmt: None   Principal Problem: Cannabinoid hyperemesis syndrome [R11.2,F12.90]                   Active Insurance as of 7/29/2025       Primary Coverage       Payor Plan Insurance Group Employer/Plan Group    [employer] [place] [employer]ICA[employer]        Payor Plan Address Payor Plan Phone Number Payor Plan Fax Number Effective Dates    [address] [address] [phone]  9/5/2018 - None Entered    [facility] [id number]         Subscriber Name Subscriber Birth Date Member ID       RICHARD ARANDA 2005                     Emergency Contacts        (Rel.) Home Phone Work Phone Mobile Phone    JENN ZIMMERMAN (Significant Other) [phone]                 Discharge Summary        Liss Nelson MD at 08/01/25 [address] [place] [address] Medicine Services  DISCHARGE SUMMARY       Date of Admission: 7/29/2025  Date of Discharge:  8/1/2025  Primary Care Physician: Shirin Nuno APRN    Presenting Problem/History of Present Illness:  Abdominal pain, nausea and vomiting    Final Discharge Diagnoses:  Active Hospital Problems "    Diagnosis     **Cannabinoid hyperemesis syndrome     Metabolic acidosis     Intractable nausea and vomiting     Enterocolitis        Consults: None    Procedures Performed: None    Pertinent Test Results:   No results found for this or any previous visit.      Imaging Results (All)       Procedure Component Value Units Date/Time    US Abdomen Limited [[id number]] Collected: 07/30/25 0802     Updated: 07/30/25 0807    Narrative:      EXAMINATION: US ABDOMEN LIMITED- 7/30/2025 8:02 AM     HISTORY: abdominal pain, n/v/d, wbc 23,000; poorly visualized appendix,  CT nonspecific; K52.9-Noninfective gastroenteritis and colitis,  unspecified; K52.9-Noninfective gastroenteritis and colitis,  unspecified; E86.0-Dehydration; R73.9-Hyperglycemia, unspecified;  E87.20-Acidosis, unspecified.     Images are stored in PACS per institutional protocols and regulatory  requirements.     REPORT: Targeted ultrasound of the right lower quadrant was performed in  the region of pain.     COMPARISON: CT abdomen pelvis 7/29/2025, ultrasound pelvis 7/29/2025.     No mass or fluid collection is identified, the appendix is not clearly  demonstrated. There are some fluid-filled bowel loops in this region.       Impression:      Nonvisualization of the appendix. No acute sonographic  abnormality.     This report was signed and finalized on 7/30/2025 8:04 AM by Dr. Tyrone Quiroz MD.       US Pelvis Complete [[id number]] Collected: 07/29/25 1530     Updated: 07/29/25 1537    Narrative:      EXAMINATION: US PELVIS COMPLETE- 7/29/2025 3:30 PM     HISTORY: abnormal CT uterus and ovaries with vascular abnormalities and  free fluid in pelvis; K52.9-Noninfective gastroenteritis and colitis,  unspecified; K52.9-Noninfective gastroenteritis and colitis,  unspecified; E86.0-Dehydration; R73.9-Hyperglycemia, unspecified;  E87.20-Acidosis, unspecified.     Images are stored in PACS per institutional protocols and regulatory  requirements.     REPORT:  Sonographic images of the pelvis were obtained transabdominally.  Images were obtained in the transverse and longitudinal dimensions.     COMPARISON: CT abdomen pelvis 7/29/2025.     The technologist reports that the examination is technically difficult  due to the patient's constant motion, excessive bowel gas, nausea and  vomiting.     The uterus measures 7.5 x 3.8 x 4.9 cm and appears anteverted. There is  shadowing from the IUD within the endometrial cavity. No endometrial  thickening is identified.     The right ovary measures 3.5 x 2.4 x 2.1 cm and appears unremarkable.  Color and spectral Doppler images demonstrate arterial and venous blood  flow within the right ovary.     The left ovary measures 4.1 x 2.0 x 1.9 cm and appears normal. Color and  spectral Doppler images demonstrate arterial and venous blood flow  within the left ovary. No free fluid is identified.       Impression:      No acute sonographic pelvic abnormality, findings suggestive  of pelvic vascular congestion syndrome are better demonstrated on CT  today. Normal appearance of the uterus and ovaries, an IUD is present  within the uterus.     This report was signed and finalized on 7/29/2025 3:34 PM by Dr. Tyrone Quiroz MD.       CT Abdomen Pelvis With Contrast [[id number]] Collected: 07/29/25 0721     Updated: 07/29/25 0737    Narrative:      EXAMINATION: CT ABDOMEN PELVIS W CONTRAST-        HISTORY:abd pain, n/v; K52.9-Noninfective gastroenteritis and colitis,  unspecified     In order to have a CT radiation dose as low as reasonably achievable  Automated Exposure Control was utilized for adjustment of the mA and/or  KV according to patient size.     Total DLP =204.84 mGy.cm     The CT scan of the abdomen and pelvis should performed after intravenous  contrast enhancement.     Images are acquired in axial plane and subsequent reconstruction in  coronal and sagittal planes.     There is no previous similar study for comparison.     The lung  bases included in the study are unremarkable.     There is fatty infiltration of the liver. No focal intrinsic  abnormality.     The spleen is normal. A small splenule is seen between the inferior  margin of the spleen and the left kidney.     No radiopaque gallstones and a small contracted gallbladder.     Pancreas is normal.     The adrenal glands bilaterally are normal.     The kidneys bilaterally are normal. No mass. No calculi. No  hydronephrosis. The ureters are not well visualized due to paucity of  the retroperitoneal fat. Urinary bladder is poorly distended. No  intrinsic abnormality.     Moderate fullness of the uterus is seen which is anteverted compressing  on the urinary bladder. An IUD in place. There are follicular changes in  both ovaries. A dominant follicle in the left ovary measures 2 cm in  diameter. There are significantly dilated tortuous pelvic veins around  the uterus and adnexa, left more than the right with opacification of  significantly dilated ovarian/gonadal veins left more than the right. No  filling defects. There is a small amount of free fluid in the pelvis.     The stomach is full of ingested material. There is diffuse thickening of  the wall/mucosa of the entire small bowel with enhancement. No  significant dilatation or air-fluid levels. The appendix not well  visualized or evaluated in this study. There is moderate thickening of  the wall of the colon which may partly be due to decompression.  Possibility of acute colitis not excluded. The peritoneal fat  infiltration is not optimally visualized or evaluated due to significant  opacity of the peritoneal and retroperitoneal fat.     A normal abdominal aorta and iliac arteries.     No evidence of abdominal or pelvic lymphadenopathy.     Images reviewed in bone window show no acute bony abnormality.       Impression:      1. A limited study due to lack of oral contrast enhancement and is  significant opacity of the peritoneal and  retroperitoneal fat.  2. Moderate diffuse thickening of the wall of the small and large bowel  without significant dilatation or air-fluid levels. This may represent  acute enterocolitis.  3. The appendix is not visualized or evaluated in this study. If  clinically warranted, a follow-up examination after oral contrast  enhancement may be obtained.  4. Moderate fullness of the uterus, prominent and tortuous pelvic veins  around the uterus and adnexa, significantly dilated gonadal/ovarian  veins and a small free fluid in the pelvis may suggest pelvic congestion  syndrome.  5. Fatty infiltration of the liver.                                      This report was signed and finalized on 7/29/2025 7:33 AM by Dr. Sejal Pierre MD.             LAB RESULTS:      Lab 08/01/25  0555 07/31/25  0519 07/30/25  0517 07/29/25  1549 07/29/25  1246 07/29/25  0920 07/29/25  0809 07/29/25  0755 07/29/25  0618   WBC 9.50 8.28 13.40*  --   --   --   --   --  23.51*   HEMOGLOBIN 12.3 11.6* 10.8*  --   --   --   --   --  12.0   HEMATOCRIT 37.6 35.3 32.6*  --   --   --   --   --  37.4   PLATELETS 291 262 263  --   --   --   --   --  327   NEUTROS ABS 6.62 5.82 9.90*  --   --   --   --   --  18.27*   IMMATURE GRANS (ABS) 0.05 0.03 0.03  --   --   --   --   --  0.10*   LYMPHS ABS 2.17 1.81 2.69  --   --   --   --   --  3.89*   MONOS ABS 0.58 0.58 0.74  --   --   --   --   --  0.98*   EOS ABS 0.05 0.03 0.01  --   --   --   --   --  0.21   MCV 80.7 80.0 79.9  --   --   --   --   --  82.0   CRP  --   --   --   --   --   --   --  <0.30  --    PROCALCITONIN  --   --   --   --   --   --   --  0.03  --    LACTATE  --   --   --  1.7 2.2* 2.8* 2.6*  --   --    LDH  --   --   --   --   --   --   --  221*  --    PROTIME  --   --   --   --   --   --   --   --  15.6*         Lab 08/01/25  0555 07/31/25  1639 07/31/25  0520 07/30/25  0517 07/29/25  0809 07/29/25  0755 07/29/25  0618   SODIUM 136  --  135* 139  --  139 142   SODIUM, VENOUS  --   --    --   --  143  --   --    POTASSIUM 4.2 3.8 3.6 3.3*  --  3.3* 3.7   POTASSIUM, VENOUS  --   --   --   --  3.2*  --   --    CHLORIDE 99  --  101 104  --  104 106   CO2 24.0  --  23.0 22.0  --  21.0* 21.0*   ANION GAP 13.0  --  11.0 13.0  --  14.0 15.0   BUN 5.7*  --  3.6* 5.9*  --  8.8 10.5   CREATININE 0.47*  --  0.35* 0.49*  --  0.54* 0.56*   EGFR 138.2  --  148.4 136.9  --  133.7 132.5   GLUCOSE 88  --  114* 82  --  189* 195*   GLUCOSE, ARTERIAL  --   --   --   --  183*  --   --    CALCIUM 9.3  --  8.6 8.8  --  8.8 9.6   MAGNESIUM 2.0  --  1.9 1.9  --   --  1.9   HEMOGLOBIN A1C 5.40  --   --   --   --   --   --          Lab 07/29/25  0618   TOTAL PROTEIN 7.5   ALBUMIN 4.7   GLOBULIN 2.8   ALT (SGPT) 12   AST (SGOT) 18   BILIRUBIN 0.3   ALK PHOS 51         Lab 07/29/25  0618   PROTIME 15.6*   INR 1.18*                 Lab 07/29/25  0809   CARBOXYHEMOGLOBIN (VENOUS) 2.8     Brief Urine Lab Results  (Last result in the past 365 days)        Color   Clarity   Blood   Leuk Est   Nitrite   Protein   CREAT   Urine HCG        07/29/25 1007 Yellow   Clear   Negative   Negative   Negative   Trace                 Microbiology Results (last 10 days)       Procedure Component Value - Date/Time    Blood Culture With LONI - Blood, Arm, Left [[id number]]  (Normal) Collected: 07/30/25 1508    Lab Status: Preliminary result Specimen: Blood from Arm, Left Updated: 07/31/25 1545     Blood Culture No growth at 24 hours    Blood Culture - Blood, Arm, Left [[id number]]  (Abnormal) Collected: 07/29/25 0920    Lab Status: Final result Specimen: Blood from Arm, Left Updated: 07/31/25 0548     Blood Culture Staphylococcus, coagulase negative     Isolated from Aerobic Bottle     Gram Stain Aerobic Bottle Gram positive cocci    Narrative:      Probable contaminant requires clinical correlation, susceptibility not performed unless requested by physician.      Blood Culture ID, PCR - Blood, Arm, Left [[id number]]  (Abnormal) Collected: 07/29/25  0920    Lab Status: Final result Specimen: Blood from Arm, Left Updated: 07/30/25 1230     BCID, PCR Staph spp, not aureus or lugdunensis. Identification by BCID2 PCR.     BOTTLE TYPE Aerobic Bottle    Blood Culture - Blood, Arm, Right [[id number]]  (Normal) Collected: 07/29/25 0853    Lab Status: Preliminary result Specimen: Blood from Arm, Right Updated: 08/01/25 0915     Blood Culture No growth at 3 days            Hospital Course:     Rosmery Padilla is a 20 y.o.  female with PMH of anxiety and self-medicating with marijuana who presented on 7/29 with complaints of mild abdominal pain and nausea and vomiting and inability to keep food down.  On CT, enterocolitis was suspected.  She has not had any bowel movements while admitted therefore no stool sample was collected.  Nausea and vomiting were initially intractable and slowly improved with diet and antiemetics.  Discussed importance of cessation of marijuana with the patient and explained that it may be a cause for cyclic vomiting.  She also had leukocytosis, lactic acidosis and hypokalemia which were all corrected.  During the imaging there was a incidental finding of pelvic congestion syndrome for which she also received a pelvic ultrasound which did not show any acute abnormalities and showed an IUD that was correctly placed within the uterus.  Patient was informed to follow-up with her OB/GYN on these findings.    # Nausea/Vomiting -improved  # Enterocolitis - seen on CT  # Leukocytosis - resolved  # Lactic acidosis - resolved  # Hypokalemia - improving  CT abdomen: Diffuse thickening of the wall small and large bowel without significant dilatation.  Possible pelvic congestive syndrome.  Fatty infiltration of the liver  No BM, stool sample not obtained  Discontinued antibiotics  Blood cultures growing CoNS in 1 of 2 sets, suspect contaminant.  Will continue to monitor  Continue antiemetics  Received IV fluids  Encouraging p.o. intake, tolerated solid  "breakfast and lunch today  Discussed possibility of cannabinoid hyperemesis syndrome and encouraged cessation     # Pelvic congestion syndrome  Pelvic ultrasound: No acute sonographic abnormality.  IUD visualized within uterus.  - Will follow-up with PCP or OB/GYN    Patient has reached the maximum benefit of hospitalization and is stable for discharge.    Physical Exam on Discharge:  /92 (BP Location: Left arm, Patient Position: Sitting)   Pulse 80   Temp 98.3 °F (36.8 °C) (Oral)   Resp 16   Ht 142.2 cm (56\")   Wt 40.8 kg (90 lb)   SpO2 98%   BMI 20.18 kg/m²   Physical Exam  GEN: Awake, alert, interactive, in NAD on room air  HEENT: Atraumatic, EOMI, Anicteric  Lungs: CTAB, no wheezing/rales/rhonchi  Heart: RRR, +S1/s2, no rub  ABD: soft, nondistended, +BS, tenderness on left quadrants  Extremities: atraumatic, no cyanosis, no edema  Skin: no rashes or lesions  Neuro: AAOx3, no focal deficits  Psych: normal mood & affect    Condition on Discharge: Stable    Discharge Disposition:  Home or Self Care    Discharge Medications:     Discharge Medications        New Medications        Instructions Start Date   famotidine 20 MG tablet  Commonly known as: PEPCID   20 mg, Oral, 2 Times Daily      ondansetron ODT 8 MG disintegrating tablet  Commonly known as: ZOFRAN-ODT   8 mg, Translingual, Every 8 Hours PRN             Continue These Medications        Instructions Start Date   Mirena (52 MG) 20 MCG/DAY intrauterine device IUD  Generic drug: Levonorgestrel   1 each, Intrauterine, Once               Discharge Diet:   Diet Instructions       Diet: Gastrointestinal Diets; Fiber-Restricted; Regular (IDDSI 7); Thin (IDDSI 0)      Discharge Diet: Gastrointestinal Diets    Gastrointestinal Diet: Fiber-Restricted    Texture: Regular (IDDSI 7)    Fluid Consistency: Thin (IDDSI 0)            Activity at Discharge: Regular, as tolerated    Follow-up Appointments:   No future appointments.    Test Results Pending at " Discharge: None    Electronically signed by Liss Nelson MD, 08/01/25, 14:23 CDT.    Time: 32 minutes.           Electronically signed by Liss Nelson MD at 08/01/25 0057

## 2025-08-02 NOTE — TELEPHONE ENCOUNTER
" Pt was calling to see if [facility] ED was busy.  Pt was just discharged from hospital for Gastroenteritis and cannot keep anything down even with nausea meds. Has a fever. RN explaining that she should return to ED due to continued fever and illness. Pt in route now.             Reason for Disposition   Health Information question, no triage required and triager able to answer question    Additional Information   Negative: [1] Caller is not with the adult (patient) AND [2] reporting urgent symptoms   Negative: Lab result questions   Negative: Medication questions   Negative: Caller can't be reached by phone   Negative: Caller has already spoken to PCP or another triager   Negative: RN needs further essential information from caller in order to complete triage   Negative: Requesting regular office appointment   Negative: [1] Caller requesting NON-URGENT health information AND [2] PCP's office is the best resource    Answer Assessment - Initial Assessment Questions  1. REASON FOR CALL or QUESTION: \"What is your reason for calling today?\" or \"How can I best help you?\" or \"What question do you have that I can help answer?\"      Pt was calling to see if [facility] ED was busy.  Pt was just discharged from hospital for Gastroenteritis and cannot keep anything down even with nausea meds. Has a fever. RN explaining that she should return to ED due to continued fever and illness. Pt in route now.    Protocols used: Information Only Call - No Triage-ADULT-    " no lymphadenopathy visible

## 2025-08-02 NOTE — DISCHARGE INSTRUCTIONS
It was very nice to meet you, Rosmery. Thank you for allowing us to take care of you today at [facility].    We discussed how some of your symptoms are likely due to use of marijuana and vape use. Try to stop using these things, and see if your symptoms resolve. Follow up with your PCP in the next couple of days as well. I sent you in some compazine to the pharmacy. Return to ER with any new or worsening symptoms.     Please understand that an ER evaluation is just the start of your evaluation. We will do what we can, but we are often unable to fully figure out what is causing your symptoms from one evaluation. Thus, our primary goal is to determine whether you need to be evaluated in the hospital or if it is safe for you to go home and see other doctors such as a primary care physician or a specialist on an outpatient basis.     Like we discussed, it is VERY IMPORTANT that you follow up with your primary care doctor (call them to set up an appointment) within the next few days or as soon as possible so that you can be re-evaluated for improvement in your symptoms or for any other questions.     Please return to the emergency room within 12-48 hours if you experience fever, chills, chest pain or shortness of breath, pain with inspiration/expiration, pain that travels to your arms, neck or back, nausea, vomiting, severe headache, tearing pain in your chest, dizziness, feel as though you are about to pass out, have any worsening symptoms, or any other concerns.    no apparent

## 2025-08-03 LAB — BACTERIA SPEC AEROBE CULT: NORMAL

## 2025-08-03 NOTE — TELEPHONE ENCOUNTER
"Caller states home from ER and still weak, fever and has had emesis 16-20 times in last 24 hours. Caller denies pregnancy. Caller states she took compazine as was prescribed. Asked is she is pushing fluids and states only half water bottle today. Caller states something is wrong. Caller advised if home from ER and feeling worse/not improved back can only advise back to ER for evaluation.         Reason for Disposition   [1] SEVERE vomiting (e.g., 6 or more times/day) AND [2] present > 8 hours (Exception: Patient sounds well, is drinking liquids, does not sound dehydrated, and vomiting has lasted less than 24 hours.)    Additional Information   Negative: Shock suspected (e.g., cold/pale/clammy skin, too weak to stand, low BP, rapid pulse)   Negative: Difficult to awaken or acting confused (e.g., disoriented, slurred speech)   Negative: Sounds like a life-threatening emergency to the triager   Negative: Vomiting occurs only while coughing   Negative: [1] Pregnant < 20 Weeks AND [2] nausea/vomiting began in early pregnancy (i.e., 4-8 weeks pregnant)   Negative: Chest pain   Negative: Headache is main symptom   Negative: Vomiting (or Nausea) in a cancer patient who is currently (or recently) receiving chemotherapy or radiation therapy, or cancer patient who has metastatic or end-stage cancer and is receiving palliative care   Negative: [1] Vomiting AND [2] contains red blood or black (\"coffee ground\") material  (Exception: Few red streaks in vomit that only happened once.)   Negative: Severe pain in one eye   Negative: Recent head injury (within last 3 days)   Negative: Recent abdominal injury (within last 3 days)   Negative: [1] Insulin-dependent diabetes (Type I) AND [2] glucose > 400 mg/dl (22 mmol/l)   Negative: [1] Vomiting AND [2] hernia is more painful or swollen than usual    Answer Assessment - Initial Assessment Questions  1. VOMITING SEVERITY: \"How many times have you vomited in the past 24 hours?\"      - " "MILD:  1 - 2 times/day     - MODERATE: 3 - 5 times/day, decreased oral intake without significant weight loss or symptoms of dehydration     - SEVERE: 6 or more times/day, vomits everything or nearly everything, with significant weight loss, symptoms of dehydration       Severe   2. ONSET: \"When did the vomiting begin?\"       Tuesday   3. FLUIDS: \"What fluids or food have you vomited up today?\" \"Have you been able to keep any fluids down?\"      Maybe half a water bottle   4. ABDOMEN PAIN: \"Are your having any abdomen pain?\" If Yes : \"How bad is it and what does it feel like?\" (e.g., crampy, dull, intermittent, constant)       Yes it's warm and inflamed.   5. DIARRHEA: \"Is there any diarrhea?\" If Yes, ask: \"How many times today?\"       On Tuesday   6. CONTACTS: \"Is there anyone else in the family with the same symptoms?\"       Denies   7. CAUSE: \"What do you think is causing your vomiting?\"      Infection or something   8. HYDRATION STATUS: \"Any signs of dehydration?\" (e.g., dry mouth [not only dry lips], too weak to stand) \"When did you last urinate?\"      Weak and fever 101  9. OTHER SYMPTOMS: \"Do you have any other symptoms?\" (e.g., fever, headache, vertigo, vomiting blood or coffee grounds, recent head injury)      fever  10. PREGNANCY: \"Is there any chance you are pregnant?\" \"When was your last menstrual period?\"        Denies    Protocols used: Vomiting-ADULT-AH    "

## 2025-08-04 ENCOUNTER — READMISSION MANAGEMENT (OUTPATIENT)
Dept: CALL CENTER | Facility: HOSPITAL | Age: 20
End: 2025-08-04
Payer: COMMERCIAL

## 2025-08-04 LAB — BACTERIA SPEC AEROBE CULT: NORMAL

## 2025-08-06 ENCOUNTER — READMISSION MANAGEMENT (OUTPATIENT)
Dept: CALL CENTER | Facility: HOSPITAL | Age: 20
End: 2025-08-06
Payer: COMMERCIAL